# Patient Record
Sex: FEMALE | Race: WHITE | NOT HISPANIC OR LATINO | Employment: OTHER | URBAN - METROPOLITAN AREA
[De-identification: names, ages, dates, MRNs, and addresses within clinical notes are randomized per-mention and may not be internally consistent; named-entity substitution may affect disease eponyms.]

---

## 2018-01-10 ENCOUNTER — HISTORICAL (OUTPATIENT)
Dept: LAB | Facility: HOSPITAL | Age: 73
End: 2018-01-10

## 2018-01-10 LAB
ABS NEUT (OLG): 4.7 X10(3)/MCL (ref 2.1–9.2)
ALBUMIN SERPL-MCNC: 4.2 GM/DL (ref 3.4–5)
ALBUMIN/GLOB SERPL: 1.3 RATIO (ref 1.1–2)
ALP SERPL-CCNC: 84 UNIT/L (ref 38–126)
ALT SERPL-CCNC: 21 UNIT/L (ref 12–78)
APPEARANCE, UA: NORMAL
AST SERPL-CCNC: 17 UNIT/L (ref 15–37)
BACTERIA SPEC CULT: NORMAL /HPF
BASOPHILS # BLD AUTO: 0 X10(3)/MCL (ref 0–0.2)
BASOPHILS NFR BLD AUTO: 1 %
BILIRUB SERPL-MCNC: 0.5 MG/DL (ref 0.2–1)
BILIRUB UR QL STRIP: NEGATIVE
BILIRUBIN DIRECT+TOT PNL SERPL-MCNC: 0.1 MG/DL (ref 0–0.5)
BILIRUBIN DIRECT+TOT PNL SERPL-MCNC: 0.4 MG/DL (ref 0–0.8)
BUN SERPL-MCNC: 25 MG/DL (ref 7–18)
CALCIUM SERPL-MCNC: 9.6 MG/DL (ref 8.5–10.1)
CHLORIDE SERPL-SCNC: 107 MMOL/L (ref 98–107)
CHOLEST SERPL-MCNC: 234 MG/DL (ref 0–200)
CHOLEST/HDLC SERPL: 4 {RATIO} (ref 0–4)
CO2 SERPL-SCNC: 31 MMOL/L (ref 21–32)
COLOR UR: YELLOW
CREAT SERPL-MCNC: 0.87 MG/DL (ref 0.55–1.02)
EOSINOPHIL # BLD AUTO: 0.1 X10(3)/MCL (ref 0–0.9)
EOSINOPHIL NFR BLD AUTO: 2 %
ERYTHROCYTE [DISTWIDTH] IN BLOOD BY AUTOMATED COUNT: 13.2 % (ref 11.5–17)
GLOBULIN SER-MCNC: 3.2 GM/DL (ref 2.4–3.5)
GLUCOSE (UA): NEGATIVE
GLUCOSE SERPL-MCNC: 95 MG/DL (ref 74–106)
HCT VFR BLD AUTO: 40.8 % (ref 37–47)
HDLC SERPL-MCNC: 58 MG/DL (ref 35–60)
HGB BLD-MCNC: 13 GM/DL (ref 12–16)
HGB UR QL STRIP: NEGATIVE
KETONES UR QL STRIP: NEGATIVE
LDLC SERPL CALC-MCNC: 146 MG/DL (ref 0–129)
LEUKOCYTE ESTERASE UR QL STRIP: NEGATIVE
LYMPHOCYTES # BLD AUTO: 2.7 X10(3)/MCL (ref 0.6–4.6)
LYMPHOCYTES NFR BLD AUTO: 33 %
MCH RBC QN AUTO: 29.9 PG (ref 27–31)
MCHC RBC AUTO-ENTMCNC: 31.9 GM/DL (ref 33–36)
MCV RBC AUTO: 93.8 FL (ref 80–94)
MONOCYTES # BLD AUTO: 0.6 X10(3)/MCL (ref 0.1–1.3)
MONOCYTES NFR BLD AUTO: 7 %
NEUTROPHILS # BLD AUTO: 4.7 X10(3)/MCL (ref 1.4–7.9)
NEUTROPHILS NFR BLD AUTO: 57 %
NITRITE UR QL STRIP: NEGATIVE
PH UR STRIP: 8 [PH] (ref 5–9)
PLATELET # BLD AUTO: 290 X10(3)/MCL (ref 130–400)
PMV BLD AUTO: 9.4 FL (ref 9.4–12.4)
POTASSIUM SERPL-SCNC: 4.7 MMOL/L (ref 3.5–5.1)
PROT SERPL-MCNC: 7.4 GM/DL (ref 6.4–8.2)
PROT UR QL STRIP: NEGATIVE
RBC # BLD AUTO: 4.35 X10(6)/MCL (ref 4.2–5.4)
RBC #/AREA URNS HPF: NORMAL /[HPF]
SODIUM SERPL-SCNC: 141 MMOL/L (ref 136–145)
SP GR UR STRIP: 1.02 (ref 1–1.03)
SQUAMOUS EPITHELIAL, UA: NORMAL
TRIGL SERPL-MCNC: 150 MG/DL (ref 30–150)
TSH SERPL-ACNC: 1.58 MIU/ML (ref 0.36–3.74)
UROBILINOGEN UR STRIP-ACNC: 1
VLDLC SERPL CALC-MCNC: 30 MG/DL
WBC # SPEC AUTO: 8.2 X10(3)/MCL (ref 4.5–11.5)
WBC #/AREA URNS HPF: NORMAL /[HPF]

## 2018-03-01 ENCOUNTER — HISTORICAL (OUTPATIENT)
Dept: ADMINISTRATIVE | Facility: HOSPITAL | Age: 73
End: 2018-03-01

## 2019-06-17 ENCOUNTER — HISTORICAL (OUTPATIENT)
Dept: ADMINISTRATIVE | Facility: HOSPITAL | Age: 74
End: 2019-06-17

## 2019-07-25 ENCOUNTER — HISTORICAL (OUTPATIENT)
Dept: ADMINISTRATIVE | Facility: HOSPITAL | Age: 74
End: 2019-07-25

## 2019-09-12 ENCOUNTER — HISTORICAL (OUTPATIENT)
Dept: ADMINISTRATIVE | Facility: HOSPITAL | Age: 74
End: 2019-09-12

## 2019-10-07 ENCOUNTER — HISTORICAL (OUTPATIENT)
Dept: SURGERY | Facility: HOSPITAL | Age: 74
End: 2019-10-07

## 2019-10-29 ENCOUNTER — HISTORICAL (OUTPATIENT)
Dept: RADIOLOGY | Facility: HOSPITAL | Age: 74
End: 2019-10-29

## 2019-12-19 ENCOUNTER — HISTORICAL (OUTPATIENT)
Dept: ADMINISTRATIVE | Facility: HOSPITAL | Age: 74
End: 2019-12-19

## 2020-01-15 ENCOUNTER — HISTORICAL (OUTPATIENT)
Dept: LAB | Facility: HOSPITAL | Age: 75
End: 2020-01-15

## 2020-01-15 LAB
ABS NEUT (OLG): 3.69 X10(3)/MCL (ref 2.1–9.2)
ALBUMIN SERPL-MCNC: 4.2 GM/DL (ref 3.4–5)
ALBUMIN/GLOB SERPL: 1.2 {RATIO}
ALP SERPL-CCNC: 104 UNIT/L (ref 38–126)
ALT SERPL-CCNC: 35 UNIT/L (ref 12–78)
APPEARANCE, UA: ABNORMAL
AST SERPL-CCNC: 21 UNIT/L (ref 15–37)
BACTERIA SPEC CULT: ABNORMAL /HPF
BASOPHILS # BLD AUTO: 0 X10(3)/MCL (ref 0–0.2)
BASOPHILS NFR BLD AUTO: 1 %
BILIRUB SERPL-MCNC: 0.4 MG/DL (ref 0.2–1)
BILIRUB UR QL STRIP: NEGATIVE
BILIRUBIN DIRECT+TOT PNL SERPL-MCNC: 0.1 MG/DL (ref 0–0.2)
BILIRUBIN DIRECT+TOT PNL SERPL-MCNC: 0.3 MG/DL (ref 0–0.8)
BUN SERPL-MCNC: 23 MG/DL (ref 7–18)
CALCIUM SERPL-MCNC: 9.9 MG/DL (ref 8.5–10.1)
CHLORIDE SERPL-SCNC: 102 MMOL/L (ref 98–107)
CHOLEST SERPL-MCNC: 229 MG/DL (ref 0–200)
CHOLEST/HDLC SERPL: 5 {RATIO} (ref 0–4)
CO2 SERPL-SCNC: 29 MMOL/L (ref 21–32)
COLOR UR: YELLOW
CREAT SERPL-MCNC: 1.1 MG/DL (ref 0.55–1.02)
EOSINOPHIL # BLD AUTO: 0 X10(3)/MCL (ref 0–0.9)
EOSINOPHIL NFR BLD AUTO: 0 %
ERYTHROCYTE [DISTWIDTH] IN BLOOD BY AUTOMATED COUNT: 14.3 % (ref 11.5–17)
EST. AVERAGE GLUCOSE BLD GHB EST-MCNC: 97 MG/DL
GLOBULIN SER-MCNC: 3.4 GM/DL (ref 2.4–3.5)
GLUCOSE (UA): NEGATIVE
GLUCOSE SERPL-MCNC: 88 MG/DL (ref 74–106)
HBA1C MFR BLD: 5 % (ref 4.2–6.3)
HCT VFR BLD AUTO: 40 % (ref 37–47)
HDLC SERPL-MCNC: 46 MG/DL (ref 35–60)
HGB BLD-MCNC: 12.3 GM/DL (ref 12–16)
HGB UR QL STRIP: NEGATIVE
KETONES UR QL STRIP: NEGATIVE
LDLC SERPL CALC-MCNC: 151 MG/DL (ref 0–129)
LEUKOCYTE ESTERASE UR QL STRIP: NEGATIVE
LYMPHOCYTES # BLD AUTO: 1.8 X10(3)/MCL (ref 0.6–4.6)
LYMPHOCYTES NFR BLD AUTO: 30 %
MCH RBC QN AUTO: 30 PG (ref 27–31)
MCHC RBC AUTO-ENTMCNC: 30.8 GM/DL (ref 33–36)
MCV RBC AUTO: 97.6 FL (ref 80–94)
MONOCYTES # BLD AUTO: 0.5 X10(3)/MCL (ref 0.1–1.3)
MONOCYTES NFR BLD AUTO: 8 %
NEUTROPHILS # BLD AUTO: 3.69 X10(3)/MCL (ref 2.1–9.2)
NEUTROPHILS NFR BLD AUTO: 61 %
NITRITE UR QL STRIP: NEGATIVE
PH UR STRIP: 8.5 [PH] (ref 5–9)
PLATELET # BLD AUTO: 321 X10(3)/MCL (ref 130–400)
PMV BLD AUTO: 8.9 FL (ref 9.4–12.4)
POTASSIUM SERPL-SCNC: 4.1 MMOL/L (ref 3.5–5.1)
PROT SERPL-MCNC: 7.6 GM/DL (ref 6.4–8.2)
PROT UR QL STRIP: ABNORMAL
RBC # BLD AUTO: 4.1 X10(6)/MCL (ref 4.2–5.4)
RBC #/AREA URNS HPF: ABNORMAL /HPF
SODIUM SERPL-SCNC: 140 MMOL/L (ref 136–145)
SP GR UR STRIP: 1.02 (ref 1–1.03)
SQUAMOUS EPITHELIAL, UA: ABNORMAL
TRIGL SERPL-MCNC: 159 MG/DL (ref 30–150)
TSH SERPL-ACNC: 1.38 MIU/L (ref 0.36–3.74)
UA WBC MAN: ABNORMAL /HPF
UROBILINOGEN UR STRIP-ACNC: 1
VLDLC SERPL CALC-MCNC: 32 MG/DL
WBC # SPEC AUTO: 6.1 X10(3)/MCL (ref 4.5–11.5)

## 2020-01-23 ENCOUNTER — HISTORICAL (OUTPATIENT)
Dept: ADMINISTRATIVE | Facility: HOSPITAL | Age: 75
End: 2020-01-23

## 2020-06-12 ENCOUNTER — HISTORICAL (OUTPATIENT)
Dept: ADMINISTRATIVE | Facility: HOSPITAL | Age: 75
End: 2020-06-12

## 2020-07-09 ENCOUNTER — HISTORICAL (OUTPATIENT)
Dept: ADMINISTRATIVE | Facility: HOSPITAL | Age: 75
End: 2020-07-09

## 2020-07-09 LAB
ALBUMIN SERPL-MCNC: 4 GM/DL (ref 3.4–4.8)
ALBUMIN/GLOB SERPL: 1.3 RATIO (ref 1.1–2)
ALP SERPL-CCNC: 68 UNIT/L (ref 40–150)
ALT SERPL-CCNC: 22 UNIT/L (ref 0–55)
AST SERPL-CCNC: 16 UNIT/L (ref 5–34)
BILIRUB SERPL-MCNC: 0.4 MG/DL
BILIRUBIN DIRECT+TOT PNL SERPL-MCNC: 0.1 MG/DL (ref 0–0.5)
BILIRUBIN DIRECT+TOT PNL SERPL-MCNC: 0.3 MG/DL (ref 0–0.8)
BUN SERPL-MCNC: 30.6 MG/DL (ref 9.8–20.1)
CALCIUM SERPL-MCNC: 9.4 MG/DL (ref 8.4–10.2)
CHLORIDE SERPL-SCNC: 103 MMOL/L (ref 98–107)
CHOLEST SERPL-MCNC: 268 MG/DL
CHOLEST/HDLC SERPL: 5 {RATIO} (ref 0–5)
CO2 SERPL-SCNC: 31 MMOL/L (ref 23–31)
CREAT SERPL-MCNC: 1.14 MG/DL (ref 0.55–1.02)
GLOBULIN SER-MCNC: 3.1 GM/DL (ref 2.4–3.5)
GLUCOSE SERPL-MCNC: 95 MG/DL (ref 82–115)
HDLC SERPL-MCNC: 55 MG/DL (ref 35–60)
LDLC SERPL CALC-MCNC: 196 MG/DL (ref 50–140)
POTASSIUM SERPL-SCNC: 4.3 MMOL/L (ref 3.5–5.1)
PROT SERPL-MCNC: 7.1 GM/DL (ref 5.8–7.6)
SODIUM SERPL-SCNC: 140 MMOL/L (ref 136–145)
TRIGL SERPL-MCNC: 85 MG/DL (ref 37–140)
VLDLC SERPL CALC-MCNC: 17 MG/DL

## 2020-10-14 ENCOUNTER — HISTORICAL (OUTPATIENT)
Dept: ADMINISTRATIVE | Facility: HOSPITAL | Age: 75
End: 2020-10-14

## 2020-10-14 LAB
CHOLEST SERPL-MCNC: 223 MG/DL
CHOLEST/HDLC SERPL: 5 {RATIO} (ref 0–5)
HDLC SERPL-MCNC: 49 MG/DL (ref 35–60)
LDLC SERPL CALC-MCNC: 155 MG/DL (ref 50–140)
TRIGL SERPL-MCNC: 93 MG/DL (ref 37–140)
VLDLC SERPL CALC-MCNC: 19 MG/DL

## 2021-01-12 ENCOUNTER — HISTORICAL (OUTPATIENT)
Dept: ADMINISTRATIVE | Facility: HOSPITAL | Age: 76
End: 2021-01-12

## 2021-01-12 LAB
ABS NEUT (OLG): 4.64 X10(3)/MCL (ref 2.1–9.2)
ALBUMIN SERPL-MCNC: 4.5 GM/DL (ref 3.4–4.8)
ALBUMIN/GLOB SERPL: 1.5 RATIO (ref 1.1–2)
ALP SERPL-CCNC: 114 UNIT/L (ref 40–150)
ALT SERPL-CCNC: 66 UNIT/L (ref 0–55)
APPEARANCE, UA: CLEAR
AST SERPL-CCNC: 20 UNIT/L (ref 5–34)
BACTERIA SPEC CULT: ABNORMAL /HPF
BASOPHILS # BLD AUTO: 0.1 X10(3)/MCL (ref 0–0.2)
BASOPHILS NFR BLD AUTO: 1 %
BILIRUB SERPL-MCNC: 0.4 MG/DL
BILIRUB UR QL STRIP: NEGATIVE
BILIRUBIN DIRECT+TOT PNL SERPL-MCNC: 0.2 MG/DL (ref 0–0.5)
BILIRUBIN DIRECT+TOT PNL SERPL-MCNC: 0.2 MG/DL (ref 0–0.8)
BUN SERPL-MCNC: 24.3 MG/DL (ref 9.8–20.1)
CALCIUM SERPL-MCNC: 9.6 MG/DL (ref 8.4–10.2)
CHLORIDE SERPL-SCNC: 105 MMOL/L (ref 98–107)
CHOLEST SERPL-MCNC: 180 MG/DL
CHOLEST/HDLC SERPL: 4 {RATIO} (ref 0–5)
CO2 SERPL-SCNC: 24 MMOL/L (ref 23–31)
COLOR UR: YELLOW
CREAT SERPL-MCNC: 0.91 MG/DL (ref 0.55–1.02)
EOSINOPHIL # BLD AUTO: 0.1 X10(3)/MCL (ref 0–0.9)
EOSINOPHIL NFR BLD AUTO: 1 %
ERYTHROCYTE [DISTWIDTH] IN BLOOD BY AUTOMATED COUNT: 13.7 % (ref 11.5–17)
GLOBULIN SER-MCNC: 3 GM/DL (ref 2.4–3.5)
GLUCOSE (UA): NEGATIVE
GLUCOSE SERPL-MCNC: 86 MG/DL (ref 82–115)
HCT VFR BLD AUTO: 35.9 % (ref 37–47)
HDLC SERPL-MCNC: 43 MG/DL (ref 35–60)
HGB BLD-MCNC: 11.2 GM/DL (ref 12–16)
HGB UR QL STRIP: NEGATIVE
KETONES UR QL STRIP: NEGATIVE
LDLC SERPL CALC-MCNC: 117 MG/DL (ref 50–140)
LEUKOCYTE ESTERASE UR QL STRIP: ABNORMAL
LYMPHOCYTES # BLD AUTO: 2.3 X10(3)/MCL (ref 0.6–4.6)
LYMPHOCYTES NFR BLD AUTO: 30 %
MCH RBC QN AUTO: 30.2 PG (ref 27–31)
MCHC RBC AUTO-ENTMCNC: 31.2 GM/DL (ref 33–36)
MCV RBC AUTO: 96.8 FL (ref 80–94)
MONOCYTES # BLD AUTO: 0.5 X10(3)/MCL (ref 0.1–1.3)
MONOCYTES NFR BLD AUTO: 6 %
NEUTROPHILS # BLD AUTO: 4.64 X10(3)/MCL (ref 2.1–9.2)
NEUTROPHILS NFR BLD AUTO: 61 %
NITRITE UR QL STRIP: NEGATIVE
PH UR STRIP: 6.5 [PH] (ref 5–9)
PLATELET # BLD AUTO: 415 X10(3)/MCL (ref 130–400)
PMV BLD AUTO: 10.1 FL (ref 9.4–12.4)
POTASSIUM SERPL-SCNC: 4.3 MMOL/L (ref 3.5–5.1)
PROT SERPL-MCNC: 7.5 GM/DL (ref 5.8–7.6)
PROT UR QL STRIP: NEGATIVE
RBC # BLD AUTO: 3.71 X10(6)/MCL (ref 4.2–5.4)
RBC #/AREA URNS HPF: ABNORMAL /[HPF]
SODIUM SERPL-SCNC: 141 MMOL/L (ref 136–145)
SP GR UR STRIP: 1.01 (ref 1–1.03)
SQUAMOUS EPITHELIAL, UA: ABNORMAL
TRIGL SERPL-MCNC: 100 MG/DL (ref 37–140)
TSH SERPL-ACNC: 1.22 UIU/ML (ref 0.35–4.94)
UROBILINOGEN UR STRIP-ACNC: 0.2
VLDLC SERPL CALC-MCNC: 20 MG/DL
WBC # SPEC AUTO: 7.6 X10(3)/MCL (ref 4.5–11.5)
WBC #/AREA URNS HPF: ABNORMAL /[HPF]

## 2021-03-02 ENCOUNTER — HISTORICAL (OUTPATIENT)
Dept: ADMINISTRATIVE | Facility: HOSPITAL | Age: 76
End: 2021-03-02

## 2021-07-06 LAB — BCS RECOMMENDATION EXT: NORMAL

## 2021-08-25 ENCOUNTER — HISTORICAL (OUTPATIENT)
Dept: ADMINISTRATIVE | Facility: HOSPITAL | Age: 76
End: 2021-08-25

## 2021-08-25 LAB
ABS NEUT (OLG): 6.63 X10(3)/MCL (ref 2.1–9.2)
ALBUMIN SERPL-MCNC: 4.3 GM/DL (ref 3.4–4.8)
ALBUMIN/GLOB SERPL: 1.5 RATIO (ref 1.1–2)
ALP SERPL-CCNC: 95 UNIT/L (ref 40–150)
ALT SERPL-CCNC: 14 UNIT/L (ref 0–55)
AST SERPL-CCNC: 17 UNIT/L (ref 5–34)
BASOPHILS # BLD AUTO: 0.1 X10(3)/MCL (ref 0–0.2)
BASOPHILS NFR BLD AUTO: 1 %
BILIRUB SERPL-MCNC: 0.4 MG/DL
BILIRUBIN DIRECT+TOT PNL SERPL-MCNC: 0.2 MG/DL (ref 0–0.5)
BILIRUBIN DIRECT+TOT PNL SERPL-MCNC: 0.2 MG/DL (ref 0–0.8)
BUN SERPL-MCNC: 23.8 MG/DL (ref 9.8–20.1)
CALCIUM SERPL-MCNC: 10 MG/DL (ref 8.4–10.2)
CHLORIDE SERPL-SCNC: 101 MMOL/L (ref 98–107)
CO2 SERPL-SCNC: 29 MMOL/L (ref 23–31)
CREAT SERPL-MCNC: 0.92 MG/DL (ref 0.55–1.02)
DEPRECATED CALCIDIOL+CALCIFEROL SERPL-MC: 45.3 NG/ML (ref 30–80)
EOSINOPHIL # BLD AUTO: 0 X10(3)/MCL (ref 0–0.9)
EOSINOPHIL NFR BLD AUTO: 0 %
ERYTHROCYTE [DISTWIDTH] IN BLOOD BY AUTOMATED COUNT: 13.5 % (ref 11.5–17)
GLOBULIN SER-MCNC: 2.8 GM/DL (ref 2.4–3.5)
GLUCOSE SERPL-MCNC: 87 MG/DL (ref 82–115)
HCT VFR BLD AUTO: 40.7 % (ref 37–47)
HGB BLD-MCNC: 12.9 GM/DL (ref 12–16)
LYMPHOCYTES # BLD AUTO: 2.7 X10(3)/MCL (ref 0.6–4.6)
LYMPHOCYTES NFR BLD AUTO: 27 %
MCH RBC QN AUTO: 29.8 PG (ref 27–31)
MCHC RBC AUTO-ENTMCNC: 31.7 GM/DL (ref 33–36)
MCV RBC AUTO: 94 FL (ref 80–94)
MONOCYTES # BLD AUTO: 0.6 X10(3)/MCL (ref 0.1–1.3)
MONOCYTES NFR BLD AUTO: 6 %
NEUTROPHILS # BLD AUTO: 6.63 X10(3)/MCL (ref 2.1–9.2)
NEUTROPHILS NFR BLD AUTO: 66 %
PLATELET # BLD AUTO: 359 X10(3)/MCL (ref 130–400)
PMV BLD AUTO: 9.2 FL (ref 9.4–12.4)
POTASSIUM SERPL-SCNC: 4.6 MMOL/L (ref 3.5–5.1)
PROT SERPL-MCNC: 7.1 GM/DL (ref 5.8–7.6)
RBC # BLD AUTO: 4.33 X10(6)/MCL (ref 4.2–5.4)
SODIUM SERPL-SCNC: 139 MMOL/L (ref 136–145)
WBC # SPEC AUTO: 10.1 X10(3)/MCL (ref 4.5–11.5)

## 2021-09-09 ENCOUNTER — HISTORICAL (OUTPATIENT)
Dept: RADIOLOGY | Facility: HOSPITAL | Age: 76
End: 2021-09-09

## 2021-10-13 LAB — CRC RECOMMENDATION EXT: NORMAL

## 2022-01-19 ENCOUNTER — HISTORICAL (OUTPATIENT)
Dept: LAB | Facility: HOSPITAL | Age: 77
End: 2022-01-19

## 2022-01-19 LAB
ALBUMIN SERPL-MCNC: 4.3 GM/DL (ref 3.4–4.8)
ALBUMIN/GLOB SERPL: 1.4 RATIO (ref 1.1–2)
ALP SERPL-CCNC: 125 UNIT/L (ref 40–150)
ALT SERPL-CCNC: 15 UNIT/L (ref 0–55)
AST SERPL-CCNC: 18 UNIT/L (ref 5–34)
BILIRUB SERPL-MCNC: 0.4 MG/DL (ref 0.2–1.2)
BILIRUBIN DIRECT+TOT PNL SERPL-MCNC: 0.2 MG/DL (ref 0–0.5)
BILIRUBIN DIRECT+TOT PNL SERPL-MCNC: 0.2 MG/DL (ref 0–0.8)
BUN SERPL-MCNC: 21.1 MG/DL (ref 9.8–20.1)
CALCIUM SERPL-MCNC: 10.1 MG/DL (ref 8.4–10.2)
CHLORIDE SERPL-SCNC: 103 MMOL/L (ref 98–107)
CHOLEST SERPL-MCNC: 214 MG/DL
CHOLEST/HDLC SERPL: 4 {RATIO} (ref 0–5)
CO2 SERPL-SCNC: 30 MMOL/L (ref 23–31)
CREAT SERPL-MCNC: 1.08 MG/DL (ref 0.57–1.11)
GLOBULIN SER-MCNC: 3.1 GM/DL (ref 2.4–3.5)
GLUCOSE SERPL-MCNC: 93 MG/DL (ref 82–115)
HCV AB SERPL QL IA: NONREACTIVE
HDLC SERPL-MCNC: 52 MG/DL (ref 40–60)
LDLC SERPL CALC-MCNC: 148 MG/DL (ref 50–140)
POTASSIUM SERPL-SCNC: 5 MMOL/L (ref 3.5–5.1)
PROT SERPL-MCNC: 7.4 GM/DL (ref 5.8–7.6)
SODIUM SERPL-SCNC: 142 MMOL/L (ref 136–145)
TRIGL SERPL-MCNC: 72 MG/DL (ref 0–150)
VLDLC SERPL CALC-MCNC: 14 MG/DL

## 2022-01-27 ENCOUNTER — HISTORICAL (OUTPATIENT)
Dept: ADMINISTRATIVE | Facility: HOSPITAL | Age: 77
End: 2022-01-27

## 2022-01-28 LAB
APPEARANCE, UA: CLEAR
BACTERIA SPEC CULT: NORMAL
BILIRUB UR QL STRIP: NEGATIVE
BUDDING YEAST: NORMAL
CASTS, UA: NORMAL
COLOR UR: YELLOW
CRYSTALS: NORMAL
GLUCOSE (UA): NEGATIVE
HGB UR QL STRIP: NEGATIVE
KETONES UR QL STRIP: NEGATIVE
LEUKOCYTE ESTERASE UR QL STRIP: NEGATIVE
NITRITE UR QL STRIP: NEGATIVE
PH UR STRIP: 6.5 [PH] (ref 5–9)
PROT UR QL STRIP: NEGATIVE
RBC #/AREA URNS HPF: NORMAL /[HPF] (ref 0–2)
SMALL ROUND CELLS, UA: NORMAL
SP GR UR STRIP: 1.02 (ref 1–1.03)
SPERM URNS QL MICRO: NORMAL
SQUAMOUS EPITHELIAL, UA: NORMAL (ref 0–4)
UROBILINOGEN UR STRIP-ACNC: 0.2
WBC #/AREA URNS HPF: NORMAL /[HPF] (ref 0–2)

## 2022-04-10 ENCOUNTER — HISTORICAL (OUTPATIENT)
Dept: ADMINISTRATIVE | Facility: HOSPITAL | Age: 77
End: 2022-04-10
Payer: MEDICARE

## 2022-04-29 VITALS
SYSTOLIC BLOOD PRESSURE: 169 MMHG | OXYGEN SATURATION: 99 % | BODY MASS INDEX: 20.48 KG/M2 | DIASTOLIC BLOOD PRESSURE: 96 MMHG | WEIGHT: 111.31 LBS | HEIGHT: 62 IN

## 2022-04-30 NOTE — OP NOTE
Patient:   Sherry Heredia             MRN: [id number]            FIN: [id number]               Age:   74 years     Sex:  Female     :  1945   Associated Diagnoses:   Primary osteoarthritis of left hip; Primary osteoarthritis of left knee; Primary osteoarthritis of right knee   Author:   José Garcia MD      Operative Note   Operative Information   Date/ Time:  10/15/2018 07:38:00.     Procedures Performed: Left hip injection using fluoroscopy.     Preoperative Diagnosis: Primary osteoarthritis of left hip ([id number] M16.12), Primary osteoarthritis of left hip (PTJ63-UD M16.12), Primary osteoarthritis of left knee (PIJ53-PF M17.12), Primary osteoarthritis of right knee (EKU34-[id number] M17.11).     Postoperative Diagnosis: Primary osteoarthritis of left hip (XVO05-PO M16.12), as above.     Surgeon: José Garcia MD.     Anesthesia: concious sedation.     Description of Procedure/Findings/    Complications: Patient was involved who complains of ongoing left hip pain.  We discussed all treatment options.  Patient has a history of arthritis to the left hip.  Patient has tried and failed all measures.  The risk, benefits, alternatives to injection of left hip under anesthesia were discussed in detail including but limited to infection, bleeding, scarring, need for revision surgery, and resolution of pain.  Despite these risks patient elected to proceed with surgical mention.    Patient seen in preoperative holding.  The risk benefits alternatives again discussed.  All questions answered no guarantees made.  Patient was marked and consented.  Was taken the operating room.  placed under conscious sedation.  Using fluoroscopic guidance, the insertion site was localized.  It was injected with 3-4 cc 1% lidocaine.  Afterwards an 18-gauge spinal needle was then inserted using fluoroscopic guidance to the superior anterior aspect of the left hip/acetabulum.  2-3 cc of Isovue contrast was then injected.  We had an  excellent arthrogram.  Afterwards 2 cc 1% lidocaine and 12 mg of betamethasone was injected into the right hip joint.  Needle was removed.  Band-Aid was placed over the injection site.      After verbal consent was obtained the patient's left knee was prepped with Betadine and anesthetized with ethyl chloride. The left knee joint was then injected under sterile technique with 2 mL of 2% lidocaine and 12 mg betamethasone. It was dressed with a Band-Aid.     After verbal consent was obtained the patient's right knee was prepped with Betadine and anesthetized with ethyl chloride. The right knee joint was then injected under sterile technique with 2 mL of 2% lidocaine and betamethasone it was dressed with a Band-Aid.     Patient arose from anesthesia without issue.  Was transferred to recovery room in stable condition. postop he will be weightbearing as tolerated.  Patient will be discharged home.  .     Esimated blood loss: No blood loss.

## 2022-05-04 NOTE — HISTORICAL [facility]
This is a historical note converted from Billy. Formatting and pictures may have been removed.  Please reference Billy for original formatting and attached multimedia. Chief Complaint  2 WEEK POST RIGHT VERNELL ON 12/4/19  History of Present Illness  74-year-old female presents here for follow-up after total hip on the right side. ?Patient is 2 weeks out. ?She is overall doing well per using a walker for ambulation. ?Pain is well controlled currently.  Review of Systems  Denies fevers, chills, chest pain, shortness of breath. Comprehensive review of systems performed and otherwise negative except as noted in HPI.  Physical Exam  Vitals & Measurements  T:?98.6? ?F (Oral)? BP:?112/62?  HT:?157.48?cm? WT:?54.6?kg? BMI:?22.02?  General: No acute distress, alert and oriented, healthy appearing?  HEENT: Head is atraumatic, mucous membranes are moist  Cardiovascular: Brisk capillary refill  Lungs: Breathing non-labored  Skin: no rashes appreciated  Neurologic: Sensation is grossly intact distally  ?  Hip exam:  Right?hip incision clean dry and intact. No erythema, drainage, or signs of infection  No swelling distally. No signs of DVT  No pain with logroll  Sensation intact distally to right foot  Positive FHL/EHL/gastrocsoleus/tib ant brisk capillary refill right foot  ?  Assessment/Plan  1.?Aftercare following joint replacement?Z47.1  ?Patient doing well following TURP on the right side. ?Pain is well controlled. ?She is ambulating well. ?Her incision is clean and dry. ?We will check her again?in a month. ?Continue physical therapy for gait training.  Ordered:  Clinic Follow up, *Est. 01/23/20 15:30:00 CST, Order for future visit, Aftercare following joint replacement, [facility]  Post-Op follow-up visit [id number] PC, Aftercare following joint replacement, [facility], 12/19/19 16:04:00 CST  XR Hip Right 2 Views, Routine, *Est. 01/16/20 3:00:00 CST, Arthritis, None, Patient Bed, Patient Has IV?, Rad Type, Order for  future visit, Aftercare following joint replacement, Not Scheduled, *Est. 01/16/20 3:00:00 CST  ?  Referrals  Clinic Follow up, *Est. 01/23/20 15:30:00 CST, Order for future visit, Aftercare following joint replacement, [facility]   Problem List/Past Medical History  Ongoing  Amblyopia  Bilateral age-related nuclear cataracts  Dry eyes  elevated cholestrol  GERD (gastroesophageal reflux disease)  Hyperlipidemia, mixed  Hypertension  Left knee pain  Migraines  Osteopenia  Right hip pain  Historical  Acute right eye pain  Acute sinus infection  Cholelithiasis  Diverticulitis  kidney stones  Panic attacks  Screening mammogram, encounter for  Wears glasses  Well adult exam  Procedure/Surgical History  EMILY ROBERTS Total Hip Arthroplasty (Right) (12/04/2019)  Replacement of Right Hip Joint with Synthetic Substitute, Uncemented, Open Approach (12/04/2019)  Robotic Assisted Procedure of Trunk Region, Open Approach (12/04/2019)  Arthrocentesis, aspiration and/or injection, major joint or bursa (eg, shoulder, hip, knee, subacromial bursa); without ultrasound guidance (10/07/2019)  Fluoroscopy of Left Hip using Low Osmolar Contrast (10/07/2019)  Injection Hip (Right) (10/07/2019)  Injection Knee (Bilateral) (10/07/2019)  Introduction of Analgesics, Hypnotics, Sedatives into Joints, Percutaneous Approach (10/07/2019)  Introduction of Anti-inflammatory into Joints, Percutaneous Approach (10/07/2019)  Bravo PH (07/01/2014)  Esophagogastroduodenoscopy (07/01/2014)  Esophagogastroduodenoscopy, flexible, transoral; diagnostic, including collection of specimen(s) by brushing or washing, when performed (separate procedure) (07/01/2014)  Other endoscopy of small intestine (07/01/2014)  Laparoscopic cholecystectomy (08/01/2012)  Laparoscopy, surgical; cholecystectomy. (08/01/2012)  reconstruction of Rt eyelid (1971)  Tubal ligation (1947)  Appendectomy  Gallbladder  Hemorroidectomy  lap joe  removal of left ovary  Removal of Rt heel  spur   Medications  aspirin 81 mg oral Delayed Release (EC) tablet, 81 mg= 1 tab(s), Oral, BID  calcium-vitamin D 600 mg-400 intl units oral tablet, 1 tab(s), Oral, BID  hydrochlorothiazide-lisinopril 12.5 mg-10 mg oral tablet, See Instructions, 3 refills  levoFLOXacin 500 mg oral tablet, 500 mg= 1 tab(s), Oral, Daily  ranitidine 300 mg oral tablet, See Instructions, 3 refills  Restasis 0.05% Eye Emulsion, 1 drop(s), Eye-Both, BID  Robaxin 750 mg oral tablet, 750 mg= 1 tab(s), Oral, TID, PRN,? ?Not taking: Last Dose Date/Time Unknown  Tylenol, 500 mg= 1 tab(s), Oral, q4hr  valACYclovir 1 g oral tablet, 2 gm= 2 tab(s), Oral, BID, 1 refills,? ?Still taking, not as prescribed: takes 1 gram (1 tab BID PRN fever blisters)  Vitamin D3 2000 intl units oral capsule, 1 tab(s), Daily,? ?Still taking, not as prescribed: Takes 1000 IU daily  Zofran ODT 4 mg oral tablet, disintegrating, 4 mg= 1 tab(s), Oral, q6hr, PRN, 1 refills,? ?Not taking: Last Dose Date/Time Unknown  Allergies  sulfamethoxazole?(itcing, red splotches)  Social History  Abuse/Neglect  No, 09/24/2019  Alcohol - Low Risk, 06/27/2014  Current, 1-2 times per year, 09/24/2019  Employment/School  Retired, 01/17/2019  Home/Environment  Lives with Spouse. Living situation: Home/Independent., 01/17/2019  Nutrition/Health  Regular, 11/12/2019  Sexual  Sexually active: Yes., 06/17/2019  Spiritual/Cultural  Oriental orthodox, 09/24/2019  Substance Use - Denies Substance Abuse, 07/31/2012  Never, 09/24/2019  Tobacco - Denies Tobacco Use, 07/31/2012  Never (less than 100 in lifetime), N/A, 12/04/2019  Family History  Primary malignant neoplasm of breast: Sister.  Immunizations  Vaccine Date Status   tetanus/diphtheria/pertussis, acel(Tdap) 01/01/2016 Recorded   Health Maintenance  Health Maintenance  ???Pending?(in the next year)  ??? ??OverDue  ??? ? ? ?Zoster Vaccine due??06/08/18??and every 100??year(s)  ??? ? ? ?Tetanus Vaccine due??06/27/18??and every 10??year(s)  ???  ??Due?  ??? ? ? ?Pneumococcal Vaccine due??12/19/19??and every?  ??? ??Postponed?  ??? ? ? ?Pneumococcal Vaccine due??01/01/20??and every 1??day(s)  ??? ??Due In Future?  ??? ? ? ?Advance Directive not due until??01/01/20??and every 1??year(s)  ??? ? ? ?Alcohol Misuse Screening not due until??01/01/20??and every 1??year(s)  ??? ? ? ?Cognitive Screening not due until??01/01/20??and every 1??year(s)  ??? ? ? ?Fall Risk Assessment not due until??01/01/20??and every 1??year(s)  ??? ? ? ?Functional Assessment not due until??01/01/20??and every 1??year(s)  ??? ? ? ?Geriatric Depression Screening not due until??01/01/20??and every 1??year(s)  ??? ? ? ?Obesity Screening not due until??01/01/20??and every 1??year(s)  ??? ? ? ?Hypertension Management-Education not due until??01/17/20??and every 1??year(s)  ??? ? ? ?ADL Screening not due until??01/17/20??and every 1??year(s)  ??? ? ? ?Bone Density Screening not due until??02/09/20??and every 2??year(s)  ??? ? ? ?Hypertension Maintenance-Medication Prescribed not due until??06/17/20??and every 1??year(s)  ??? ? ? ?Hypertension Management-BMP not due until??12/04/20??and every 1??year(s)  ??? ? ? ?Aspirin Therapy for CVD Prevention not due until??12/05/20??and every 1??year(s)  ??? ? ? ?Hypertension Management-Blood Pressure not due until??12/18/20??and every 1??year(s)  ???Satisfied?(in the past 1 year)  ??? ??Satisfied?  ??? ? ? ?ADL Screening on??01/17/19.??Satisfied by Brittny Dowling LPN  ??? ? ? ?Advance Directive on??06/24/19.??Satisfied by Brittny Dowling LPN  ??? ? ? ?Alcohol Misuse Screening on??01/17/19.??Satisfied by Brittny Dowling LPN  ??? ? ? ?Aspirin Therapy for CVD Prevention on??12/05/19.??Satisfied by Eve Martinez NP  ??? ? ? ?Blood Pressure Screening on??12/19/19.??Satisfied by Eduarda Canada  ??? ? ? ?Body Mass Index Check on??12/19/19.??Satisfied by Eduarda Canada  ??? ? ? ?Breast Cancer Screening ([facility]) on??02/12/19.??Satisfied by  Amina Diaz  ??? ? ? ?Cognitive Screening on??01/17/19.??Satisfied by Brittny Dowling LPN  ??? ? ? ?Depression Screening on??09/24/19.??Satisfied by Chyna Lehman  ??? ? ? ?Diabetes Screening on??12/05/19.??Satisfied by Ayaan Mar  ??? ? ? ?Fall Risk Assessment on??10/07/19.??Satisfied by Lanie Metzger RN  ??? ? ? ?Functional Assessment on??01/17/19.??Satisfied by Brittny Dowling LPN  ??? ? ? ?Geriatric Depression Screening on??01/17/19.??Satisfied by Brittny Dowling LPN  ??? ? ? ?Hypertension Management-Blood Pressure on??12/19/19.??Satisfied by Eduarda Canada  ??? ? ? ?Hypertension Maintenance-Medication Prescribed on??06/17/19.??Satisfied by Brittny Dowling LPN  ??? ? ? ?Influenza Vaccine on??12/04/19.??Satisfied by Lanie Metzger RN  ??? ? ? ?Obesity Screening on??12/19/19.??Satisfied by Eduarda Canada  ?  Diagnostic Results  AP lateral hip reviewed. ?Patients implants appear well fixed. ?No signs of loosening or subsidence noted.

## 2022-05-04 NOTE — HISTORICAL [facility]
This is a historical note converted from Billy. Formatting and pictures may have been removed.  Please reference Billy for original formatting and attached multimedia. Chief Complaint  B/L KNEE PAIN AND RIGHT HIP REFERRED BY DR. BORGES HE GAVE HER AN INJECTION IN THE HIP NO HELP HE PUT HER ON MOBIC WHICH HAS HELPED.  History of Present Illness  74-year-old female presents today for evaluation of right groin knee pain. ?Patient is with significant amount of groin pain?with system to put on her shoes and socks. ?She is unable to flex her hip?although this has improved with some meloxicam. ?Significant pain getting out of a chair as well as walking on flat ground.  Review of Systems  Denies fevers, chills, chest pain, shortness of breath. Comprehensive review of systems performed and otherwise negative except as noted in HPI.  Physical Exam  Vitals & Measurements  T:?97.3? ?F (Oral)? BP:?157/85?  HT:?158?cm? WT:?54.6?kg? BMI:?21.87?  General: No acute distress, alert and oriented, healthy appearing?  HEENT: Head is atraumatic, mucous membranes are moist  Neck: Supples, no JVD  Cardiovascular: Palpable dorsalis pedis and posterior tibial pulses, regular rate and rhythm to those pulses  Lungs: Breathing non-labored  Skin: no rashes appreciated  Neurologic: Can flex and extend knees, ankles, and toes. Sensation is grossly intact  ?  Right hip:  Sensation intact disappeared brisk cap refill distally. ?Range of motion right hip with significant pain.? Patient is a positive [place]inchfield today. ?She has significant pain with internal rotation to her groin.  ?  Bilateral knees:  Crepitus of patellofemoral joint of both of her knees. ?Sensation intact disappeared she has full extension. ?Flexion to 125.  Assessment/Plan  1.?Primary osteoarthritis of right knee?M17.11  ?Patient end-stage arthritis to right knee with significant chondrocalcinosis. ?She does not wish any further intervention right now such as injections as?her  main symptoms coming from her hip. ?She would be a candidate for injections in the near future if she wishes.  Ordered:  Office/Outpatient Visit Level 3 Established [id number] PC, Primary osteoarthritis of right knee  Primary osteoarthritis of right hip, [facility], 09/12/19 14:35:00 CDT  ?  2.?Primary osteoarthritis of right hip?M16.11  ?Patient with significant arthritis to her right hip is judged on x-ray as well as CT scan. ?We discussed all treatment options. ?She does not wish to undergo surgical intervention but is in favor of conservative measures with an injection.? The risk, benefits, alternatives?to injection/arthrogram of right hip were discussed in detail.? We will plan to inject the right hip with 2 cc of Celestone and 2 cc of lidocaine?at next convenient interval.  Ordered:  Office/Outpatient Visit Level 3 Established [id number] PC, Primary osteoarthritis of right knee  Primary osteoarthritis of right hip, [facility], 09/12/19 14:35:00 CDT  ?  Orders:  XR Knee Left 4 or More Views, Routine, 09/12/19 14:07:00 CDT, Pain, None, Ambulatory, Patient Has IV?, Rad Type, Acute knee pain, Not Scheduled, 09/12/19 14:07:00 CDT  XR Knee Right 4 or More Views, Routine, 09/12/19 14:06:00 CDT, Pain, None, Ambulatory, Patient Has IV?, Rad Type, Acute knee pain, Not Scheduled, 09/12/19 14:06:00 CDT   Problem List/Past Medical History  Ongoing  Dry eyes  elevated cholestrol  GERD (gastroesophageal reflux disease)  Hyperlipidemia, mixed  Hypertension  Left knee pain  Migraines  Osteopenia  Right hip pain  Historical  Acute right eye pain  Acute sinus infection  Cholelithiasis  Diverticulitis  kidney stones  Panic attacks  Screening mammogram, encounter for  Wears glasses  Well adult exam  Procedure/Surgical History  Bravo PH (07/01/2014)  Esophagogastroduodenoscopy (07/01/2014)  Esophagogastroduodenoscopy, flexible, transoral; diagnostic, including collection of specimen(s) by brushing or washing, when  performed (separate procedure) (07/01/2014)  Other endoscopy of small intestine (07/01/2014)  Laparoscopic cholecystectomy (08/01/2012)  Laparoscopy, surgical; cholecystectomy. (08/01/2012)  reconstruction of Rt eyelid (1971)  Tubal ligation (1947)  Appendectomy  Gallbladder  Hemorroidectomy  lap joe  removal of left ovary  Removal of Rt heel spur   Medications  Aspir 81 oral delayed release tablet, 81 mg= 1 tab(s), Oral, Daily  diclofenac sodium 75 mg oral delayed release tablet, See Instructions, 6 refills  hydrochlorothiazide-lisinopril 12.5 mg-10 mg oral tablet, 1 tab(s), Oral, Daily, 3 refills  methylPREDNISolone 4 mg oral tab, Oral, As Directed  Mobic 15 mg oral tablet, 15 mg= 1 tab(s), Oral, Daily  ranitidine 300 mg oral tablet, 300 mg= 1 tab(s), Oral, BID, 3 refills  Restasis 0.05% Eye Emulsion, 1 drop(s), Eye-Both, BID  valACYclovir 1 g oral tablet, 2 gm= 2 tab(s), Oral, BID, 1 refills  Vitamin D3 2000 intl units oral capsule, 1 tab(s), Daily  Allergies  Penicillin?(unknown)  sulfamethoxazole?(itcing, red splotches)  Social History  Abuse/Neglect  No, No, Yes, 09/12/2019  Alcohol - Low Risk, 06/27/2014  Current, Wine, 07/31/2012  Employment/School  Retired, 01/17/2019  Home/Environment  Lives with Spouse. Living situation: Home/Independent., 01/17/2019  Nutrition/Health  Type of diet: selective foods due to diverticulitis. Regular, 07/31/2012  Sexual  Sexually active: Yes., 06/17/2019  Substance Use - Denies Substance Abuse, 07/31/2012  Never, 01/17/2019  Tobacco - Denies Tobacco Use, 07/31/2012  Never (less than 100 in lifetime), N/A, Household tobacco concerns: No., 09/12/2019  Family History  Family history is negative  Immunizations  Vaccine Date Status   tetanus/diphtheria/pertussis, acel(Tdap) 01/01/2016 Recorded   Health Maintenance  Health Maintenance  ???Pending?(in the next year)  ??? ??OverDue  ??? ? ? ?Zoster Vaccine due??06/08/18??and every 100??year(s)  ??? ? ? ?Tetanus Vaccine  due??06/27/18??and every 10??year(s)  ??? ? ? ?Hypertension Management-BMP due??01/10/19??and every 1??year(s)  ??? ??Due?  ??? ? ? ?Pneumococcal Vaccine due??09/12/19??and every?  ??? ??Postponed?  ??? ? ? ?Pneumococcal Vaccine due??01/01/20??and every 1??day(s)  ??? ??Due In Future?  ??? ? ? ?Advance Directive not due until??01/01/20??and every 1??year(s)  ??? ? ? ?Alcohol Misuse Screening not due until??01/01/20??and every 1??year(s)  ??? ? ? ?Cognitive Screening not due until??01/01/20??and every 1??year(s)  ??? ? ? ?Fall Risk Assessment not due until??01/01/20??and every 1??year(s)  ??? ? ? ?Functional Assessment not due until??01/01/20??and every 1??year(s)  ??? ? ? ?Geriatric Depression Screening not due until??01/01/20??and every 1??year(s)  ??? ? ? ?Obesity Screening not due until??01/01/20??and every 1??year(s)  ??? ? ? ?Aspirin Therapy for CVD Prevention not due until??01/17/20??and every 1??year(s)  ??? ? ? ?Hypertension Management-Education not due until??01/17/20??and every 1??year(s)  ??? ? ? ?ADL Screening not due until??01/17/20??and every 1??year(s)  ??? ? ? ?Bone Density Screening not due until??02/09/20??and every 2??year(s)  ??? ? ? ?Hypertension Maintenance-Medication Prescribed not due until??06/17/20??and every 1??year(s)  ??? ? ? ?Hypertension Management-Blood Pressure not due until??08/27/20??and every 1??year(s)  ???Satisfied?(in the past 1 year)  ??? ??Satisfied?  ??? ? ? ?ADL Screening on??01/17/19.??Satisfied by Brittny Dowling LPN  ??? ? ? ?Advance Directive on??06/24/19.??Satisfied by Brittny Dowling LPN  ??? ? ? ?Alcohol Misuse Screening on??01/17/19.??Satisfied by Brittny Dowling LPN  ??? ? ? ?Aspirin Therapy for CVD Prevention on??01/17/19.??Satisfied by Brittny Dowling LPN  ??? ? ? ?Blood Pressure Screening on??09/12/19.??Satisfied by Hussain Peterson  ??? ? ? ?Body Mass Index Check on??09/12/19.??Satisfied by Hussain Peterson  ??? ? ? ?Breast Cancer Screening ([facility])  on??02/12/19.??Satisfied by Amina Diaz Ray  ??? ? ? ?Cognitive Screening on??01/17/19.??Satisfied by Brittny Dowling LPN  ??? ? ? ?Depression Screening on??01/17/19.??Satisfied by Brittny Dowling LPN  ??? ? ? ?Fall Risk Assessment on??01/17/19.??Satisfied by Brittny Dowling LPN  ??? ? ? ?Functional Assessment on??01/17/19.??Satisfied by Brittny Dowling LPN  ??? ? ? ?Geriatric Depression Screening on??01/17/19.??Satisfied by Brittny Dowling LPN  ??? ? ? ?Hypertension Management-Blood Pressure on??09/12/19.??Satisfied by Hussain Peterson  ??? ? ? ?Hypertension Maintenance-Medication Prescribed on??06/17/19.??Satisfied by Brittny Dowling LPN  ??? ? ? ?Obesity Screening on??09/12/19.??Satisfied by Hussain Peterson  ?  Diagnostic Results  AP lateral right hip reviewed. ?Patient with?significant joint space narrowing is judged on the x-ray as well as CT scan to her right hip.

## 2022-05-04 NOTE — HISTORICAL [facility]
This is a historical note converted from Billy. Formatting and pictures may have been removed.  Please reference Billy for original formatting and attached multimedia. Chief Complaint  f/u right hip / pt had sx 12/4/19 pt states she fell in nov 20. both hips are hurting but the rigt is causing her more pain.  History of Present Illness  75-year-old female status post right hip arthroplasty. ?Patient?was doing excellent when she tripped over a cord landed on her elbows had bilateral olecranon fractures?treated by another physician. ?Since that time she has had some lateral right hip pain. ?Denies any radiations proximally or distally. ?Also with some left hip pain although it is much more minor.? She is able to ambulate. ?She uses no assistive device. ?She has tried anti-inflammatories?orally.? No therapy. ?No injections.  Review of Systems  Denies fevers, chills, chest pain, shortness of breath. Comprehensive review of systems performed and otherwise negative except as noted in HPI.  Physical Exam  Vitals & Measurements  T:?97? ?F (Oral)? HR:?94(Peripheral)? BP:?149/84?  HT:?157.48?cm? WT:?49.950?kg? BMI:?20.14?  General: No acute distress, alert and oriented, healthy appearing?  HEENT: Head is atraumatic, mucous membranes are moist?  Cardiovascular: Brisk capillary refill  Lungs: Breathing non-labored?  Skin: no rashes appreciated?  Neurologic: Sensation is grossly intact distally  ?  Right hip:  Point tenderness laterally over trochanteric bursa. ?Brisk cap refill distally. ?Sensation tact distally.? Patient with a negative Stinchfield. ?Internal and external rotation cause her no significant issues or pain.  Assessment/Plan  1.?Aftercare following joint replacement?Z47.1  ?Patient with a flareup of trochanteric bursitis likely hemorrhagic?following her fall. ?We will give her an injection to calm this down. ?If this fails to improve her symptoms, we could try some physical therapy. ?She will contact us if she  has ongoing issues with regards to the right hip.? Otherwise we will see her back on as-needed basis.  ?  After verbal consent was obtained the patients right right lateral trochanter was prepped with Betadine and anesthetized with ethyl chloride over point of maximal tenderness. The left trochanteric bursa was then injected under sterile technique with 2 mL of 2% lidocaine and 12 mg betamethasone. It was dressed with a Band-Aid. The patient received good relief from the injection and was able to ambulate normally from clinic.  ?  Ordered:  Clinic Follow-up PRN, 03/02/21 16:36:00 CST, Future Order, Orthopaedics  Office/Outpatient Visit Level 3 Established [id number] PC, Trochanteric bursitis, right hip  Aftercare following joint replacement, Orthopaedics Clinic, 03/02/21 16:35:00 CST  ?  2.?Trochanteric bursitis, right hip?M70.61  Ordered:  betamethasone, 12 mg, Intra-Articular, Once, first dose 03/02/21 17:00:00 CST, stop date 03/02/21 17:00:00 CST  Lidocaine inj., 2 mL, Intra-Articular, Once, first dose 03/02/21 16:35:00 CST, stop date 03/02/21 16:35:00 CST  asp/inj jnt/bursa, major [id number] PC, 03/02/21 16:35:00 CST, Orthopaedics Clinic, Routine, 03/02/21 16:35:00 CST, Trochanteric bursitis, right hip  Clinic Follow-up PRN, 03/02/21 16:36:00 CST, Future Order, Orthopaedics  Office/Outpatient Visit Level 3 Established [id number] PC, Trochanteric bursitis, right hip  Aftercare following joint replacement, Orthopaedics Clinic, 03/02/21 16:35:00 CST  ?  Referrals  Clinic Follow-up PRN, 03/02/21 16:36:00 CST, Future Order, Orthopaedics   Problem List/Past Medical History  Ongoing  Abnormal glucose  Amblyopia  Bilateral age-related nuclear cataracts  Chronic idiopathic constipation  Constipation  GERD (gastroesophageal reflux disease)  Hyperlipidemia, mixed  Hypertension  Left knee pain  Migraines  Need for pneumococcal vaccine  Osteopenia  Right hip pain  Well adult exam  Historical  Acute right eye pain  Acute sinus  infection  Cholelithiasis  Diverticulitis  Dry eyes  elevated cholestrol  kidney stones  Panic attacks  Screening mammogram, encounter for  Stage III chronic kidney disease  Wears glasses  Procedure/Surgical History  Mammogram (06/30/2020)  EMILY ROBERTS Total Hip Arthroplasty (Right) (12/04/2019)  Replacement of Right Hip Joint with Synthetic Substitute, Uncemented, Open Approach (12/04/2019)  Robotic Assisted Procedure of Trunk Region, Open Approach (12/04/2019)  Arthrocentesis, aspiration and/or injection, major joint or bursa (eg, shoulder, hip, knee, subacromial bursa); without ultrasound guidance (10/07/2019)  Fluoroscopy of Left Hip using Low Osmolar Contrast (10/07/2019)  Injection Hip (Right) (10/07/2019)  Injection Knee (Bilateral) (10/07/2019)  Introduction of Analgesics, Hypnotics, Sedatives into Joints, Percutaneous Approach (10/07/2019)  Introduction of Anti-inflammatory into Joints, Percutaneous Approach (10/07/2019)  Bravo PH (07/01/2014)  Esophagogastroduodenoscopy (07/01/2014)  Esophagogastroduodenoscopy, flexible, transoral; diagnostic, including collection of specimen(s) by brushing or washing, when performed (separate procedure) (07/01/2014)  Other endoscopy of small intestine (07/01/2014)  Laparoscopic cholecystectomy (08/01/2012)  Laparoscopy, surgical; cholecystectomy. (08/01/2012)  Appendectomy (01/01/1997)  reconstruction of Rt eyelid (1971)  Tubal ligation (1947)  Appendectomy  Cholecystectomy  Eyelid Surgery  Gallbladder  Hemorroidectomy  lap joe  removal of left ovary  Removal of Rt heel spur   Medications  aspirin 81 mg oral Delayed Release (EC) tablet, 81 mg= 1 tab(s), Oral, Daily  calcium-vitamin D 600 mg-400 intl units oral tablet, 1 tab(s), Oral, BID  Celestone, 12 mg, Intra-Articular, Once  diclofenac sodium 75 mg oral delayed release tablet, See Instructions  lidocaine 2% injectable solution, 2 mL, Intra-Articular, Once  lisinopril 10 mg oral tablet, 10 mg= 1 tab(s), Oral, Daily, 3  refills  Lunesta 3 mg oral tablet, 3 mg= 1 tab(s), Oral, Once a day (at bedtime), PRN, 5 refills  omeprazole 40 mg oral DR capsule, See Instructions  ondansetron 4 mg oral tablet, 4 mg= 1 tab(s), Oral, q6hr, PRN  Restasis 0.05% Eye Emulsion, 1 drop(s), Eye-Both, BID  traMADol 50 mg oral tablet, 50 mg, Oral, q4hr, PRN  Trulance 3 mg oral tablet, 3 mg= 1 tab(s), Oral, Daily  valACYclovir 1 g oral tablet, 2 gm= 2 tab(s), Oral, BID, 1 refills,? ?Still taking, not as prescribed: takes 1 gram (1 tab BID PRN fever blisters)  Zetia 10 mg oral tablet, 10 mg= 1 tab(s), Oral, Daily, 3 refills  Allergies  penicillin G benzathine?(Unknown)  sulfamethoxazole?(itcing, red splotches)  Social History  Abuse/Neglect  No, 02/09/2021  Alcohol - Low Risk, 06/27/2014  Current, 1-2 times per year, 09/24/2019  Employment/School  Retired, 01/17/2019  Financial/Legal Situation  None, 01/19/2021  Home/Environment  Lives with Spouse. Living situation: Home/Independent., 01/17/2019    Never in , 01/19/2021  Nutrition/Health  Regular, 11/12/2019  Sexual  Sexually active: Yes., 06/17/2019  Spiritual/Cultural  [place], 09/24/2019  Substance Use - Denies Substance Abuse, 07/31/2012  Never, 09/24/2019  Tobacco - Denies Tobacco Use, 07/31/2012  Never (less than 100 in lifetime), N/A, Household tobacco concerns: No. Smokeless Tobacco Use: Never., 02/09/2021  Family History  Primary malignant neoplasm of breast: Sister.  Immunizations  Vaccine Date Status   COVID-19 MRNA, LNP-S, PF- Pfizer 02/10/2021 Given   pneumococcal 23-polyvalent vaccine 01/19/2021 Given   influenza virus vaccine, inactivated 12/07/2020 Recorded   pneumococcal 13-valent conjugate vaccine 01/15/2020 Given   tetanus/diphtheria/pertussis, acel(Tdap) 01/01/2016 Recorded   Health Maintenance  Health Maintenance  ???Pending?(in the next year)  ??? ??OverDue  ??? ? ? ?Tetanus Vaccine due??06/27/18??and every 10??year(s)  ??? ? ? ?Bone Density Screening due??02/09/20??and  every 2??year(s)  ??? ??Due?  ??? ? ? ?Zoster Vaccine due??03/02/21??Unknown Frequency  ??? ??Due In Future?  ??? ? ? ?Influenza Vaccine not due until??10/01/21??and every 1??day(s)  ??? ? ? ?Obesity Screening not due until??01/01/22??and every 1??year(s)  ??? ? ? ?Advance Directive not due until??01/02/22??and every 1??year(s)  ??? ? ? ?Alcohol Misuse Screening not due until??01/02/22??and every 1??year(s)  ??? ? ? ?Cognitive Screening not due until??01/02/22??and every 1??year(s)  ??? ? ? ?Fall Risk Assessment not due until??01/02/22??and every 1??year(s)  ??? ? ? ?Functional Assessment not due until??01/02/22??and every 1??year(s)  ??? ? ? ?ADL Screening not due until??01/05/22??and every 1??year(s)  ??? ? ? ?Hypertension Management-BMP not due until??01/12/22??and every 1??year(s)  ??? ? ? ?Medicare Annual Wellness Exam not due until??01/19/22??and every 1??year(s)  ??? ? ? ?Hypertension Management-Education not due until??01/19/22??and every 1??year(s)  ??? ? ? ?Blood Pressure Screening not due until??02/09/22??and every 1??year(s)  ??? ? ? ?Body Mass Index Check not due until??02/09/22??and every 1??year(s)  ??? ? ? ?Depression Screening not due until??02/09/22??and every 1??year(s)  ??? ? ? ?Hypertension Management-Blood Pressure not due until??02/09/22??and every 1??year(s)  ???Satisfied?(in the past 1 year)  ??? ??Satisfied?  ??? ? ? ?ADL Screening on??01/05/21.??Satisfied by Brittny Dowling LPN  ??? ? ? ?Advance Directive on??01/19/21.??Satisfied by Brittny Dowling LPN  ??? ? ? ?Alcohol Misuse Screening on??01/05/21.??Satisfied by Brittny Dowling LPN  ??? ? ? ?Aspirin Therapy for CVD Prevention on??03/02/21.  ??? ? ? ?Blood Pressure Screening on??03/02/21.??Satisfied by Claudine Trent  ??? ? ? ?Body Mass Index Check on??03/02/21.??Satisfied by Claudine Trent  ??? ? ? ?Breast Cancer Screening on??06/30/20.??Satisfied by Shukri Costa MD  ??? ? ? ?Cognitive Screening on??02/09/21.??Satisfied by Nitesh MATTA,  Brittny  ??? ? ? ?Depression Screening on??03/02/21.??Satisfied by Claudine Trent  ??? ? ? ?Diabetes Screening on??01/12/21.??Satisfied by Lis Farrell  ??? ? ? ?Fall Risk Assessment on??03/02/21.??Satisfied by Claudine Trent  ??? ? ? ?Functional Assessment on??03/02/21.??Satisfied by Claudine Trent  ??? ? ? ?Hypertension Management-Blood Pressure on??03/02/21.??Satisfied by Claudine Trent  ??? ? ? ?Influenza Vaccine on??12/07/20.??Satisfied by Brittny Dowling LPN  ??? ? ? ?Lipid Screening on??01/12/21.??Satisfied by Lis Farrell  ??? ? ? ?Medicare Annual Wellness Exam on??01/19/21.??Satisfied by Shukri Costa MD  ??? ? ? ?Obesity Screening on??03/02/21.??Satisfied by Claudine Trent  ??? ? ? ?Pneumococcal Vaccine on??01/19/21.??Satisfied by Brittny Dowling LPN  ?  Diagnostic Results  AP lateral right hip reviewed. ?Patient implants well fixed. ?No signs of loosening or subsidence noted?no fracture seen.

## 2022-05-04 NOTE — HISTORICAL [facility]
This is a historical note converted from Billy. Formatting and pictures may have been removed.  Please reference Billy for original formatting and attached multimedia. Chief Complaint  2 month f/u right total hip, sx 12/4/19. Pt states she is having some pain in the right hip and thigh area.  History of Present Illness  74-year-old female presents here for follow-up after total knee right side. ?Patient is about 6 months out. ?Overall doing very well. ?Began developing some?right lateral hip pain as well as?some mild thigh pain at times. ?Denies any significant start up pain. ?Pain is?much improved?from her preoperative levels and she is happy with her recovery. ?She is still very active?in the yard and garden.  Review of Systems  Denies fevers, chills, chest pain, shortness of breath. Comprehensive review of systems performed and otherwise negative except as noted in HPI.  Physical Exam  Vitals & Measurements  T:?37.1? ?C (Oral)? HR:?84(Peripheral)? BP:?108/72?  HT:?157.48?cm? WT:?53.6?kg? BMI:?21.61?  General: No acute distress, alert and oriented, healthy appearing?  HEENT: Head is atraumatic, mucous membranes are moist  Cardiovascular: Brisk capillary refill  Lungs: Breathing non-labored  Skin: no rashes appreciated  Neurologic: Sensation is grossly intact distally  ?  Right hip:  Patient with no pain with range of motion the right hip. ?No pain with circumduction. ?Negative Stinchfield. ?She has excellent range of motion with flexion?greater than 100 degrees and internal and external rotation 20 degrees both directions.? Also with some significant tenderness laterally over trochanteric bursa.  Assessment/Plan  1.?Aftercare following joint replacement?Z47.1  ?Patients hip is well fixed and appears to be well ingrown x-rays.? No issues with the implants?noted today.  Ordered:  Clinic Follow up, *Est. 07/03/20 3:00:00 CDT, Order for future visit, Aftercare following joint replacement  Trochanteric bursitis,  right hip, Orthopaedics  Office/Outpatient Visit Level 3 Established [id number] , Aftercare following joint replacement  Trochanteric bursitis, right hip, Orthopaedics Clinic, 06/12/20 9:42:00 CDT  ?  2.?Trochanteric bursitis, right hip?M70.61  ?We will try an injection intertrochanteric bursa to calm this down. ?We will contact her with a telephone message in 3 weeks to check her progress.  ?  After verbal consent was obtained the patients right right lateral trochanter was prepped with Betadine and anesthetized with ethyl chloride over point of maximal tenderness. The left trochanteric bursa was then injected under sterile technique with 2 mL of 2% lidocaine and 12 mg betamethasone. It was dressed with a Band-Aid. The patient received good relief from the injection and was able to ambulate normally from clinic.  ?  Ordered:  betamethasone, 12 mg, Intra-Articular, Once, first dose 06/12/20 10:00:00 CDT, stop date 06/12/20 10:00:00 CDT  Lidocaine inj., 2 mL, Intra-Articular, Once, first dose 06/12/20 9:42:00 CDT, stop date 06/12/20 9:42:00 CDT  asp/inj jnt/bursa, major [id number] , 06/12/20 9:42:00 CDT, Orthopaedics Clinic, Routine, 06/12/20 9:42:00 CDT, Trochanteric bursitis, right hip  Clinic Follow up, *Est. 07/03/20 3:00:00 CDT, Order for future visit, Aftercare following joint replacement  Trochanteric bursitis, right hip, Orthopaedics  Office/Outpatient Visit Level 3 Established [id number] , Aftercare following joint replacement  Trochanteric bursitis, right hip, Orthopaedics Clinic, 06/12/20 9:42:00 CDT  ?  Referrals  Clinic Follow up, *Est. 07/03/20 3:00:00 CDT, Order for future visit, Aftercare following joint replacement  Trochanteric bursitis, right hip, Orthopaedics   Problem List/Past Medical History  Ongoing  Abnormal glucose  Amblyopia  Bilateral age-related nuclear cataracts  GERD (gastroesophageal reflux disease)  Hyperlipidemia, mixed  Hypertension  Left knee pain  Migraines  Need for  pneumococcal vaccine  Osteopenia  Right hip pain  Screening mammogram, encounter for  Well adult exam  Historical  Acute right eye pain  Acute sinus infection  Cholelithiasis  Diverticulitis  Dry eyes  elevated cholestrol  kidney stones  Panic attacks  Wears glasses  Procedure/Surgical History  EMILY ROBERTS Total Hip Arthroplasty (Right) (12/04/2019)  Replacement of Right Hip Joint with Synthetic Substitute, Uncemented, Open Approach (12/04/2019)  Robotic Assisted Procedure of Trunk Region, Open Approach (12/04/2019)  Arthrocentesis, aspiration and/or injection, major joint or bursa (eg, shoulder, hip, knee, subacromial bursa); without ultrasound guidance (10/07/2019)  Fluoroscopy of Left Hip using Low Osmolar Contrast (10/07/2019)  Injection Hip (Right) (10/07/2019)  Injection Knee (Bilateral) (10/07/2019)  Introduction of Analgesics, Hypnotics, Sedatives into Joints, Percutaneous Approach (10/07/2019)  Introduction of Anti-inflammatory into Joints, Percutaneous Approach (10/07/2019)  Bravo PH (07/01/2014)  Esophagogastroduodenoscopy (07/01/2014)  Esophagogastroduodenoscopy, flexible, transoral; diagnostic, including collection of specimen(s) by brushing or washing, when performed (separate procedure) (07/01/2014)  Other endoscopy of small intestine (07/01/2014)  Laparoscopic cholecystectomy (08/01/2012)  Laparoscopy, surgical; cholecystectomy. (08/01/2012)  reconstruction of Rt eyelid (1971)  Tubal ligation (1947)  Appendectomy  Gallbladder  Hemorroidectomy  lap joe  removal of left ovary  Removal of Rt heel spur   Medications  calcium-vitamin D 600 mg-400 intl units oral tablet, 1 tab(s), Oral, BID  Celestone, 12 mg, Intra-Articular, Once  diclofenac sodium 75 mg oral delayed release tablet, 75 mg= 1 tab(s), Oral, BID  hydrochlorothiazide-lisinopril 12.5 mg-10 mg oral tablet, See Instructions, 3 refills  levoFLOXacin 500 mg oral tablet, 500 mg= 1 tab(s), Oral, Daily  lidocaine 2% injectable solution, 2 mL,  Intra-Articular, Once  Lunesta 3 mg oral tablet, 3 mg= 1 tab(s), Oral, Once a day (at bedtime), PRN, 1 refills  methocarbamol 750 mg oral tablet, 750 mg= 1 tab(s), Oral, TID,? ?Not Taking, Completed Rx  omeprazole 40 mg oral DR capsule, 40 mg= 1 cap(s), Oral, Daily, 11 refills  Restasis 0.05% Eye Emulsion, 1 drop(s), Eye-Both, BID  traMADol 50 mg oral tablet, 50 mg= 1 tab(s), Oral, q4hr,? ?Not Taking, Completed Rx  valACYclovir 1 g oral tablet, 2 gm= 2 tab(s), Oral, BID, 1 refills,? ?Still taking, not as prescribed: takes 1 gram (1 tab BID PRN fever blisters)  Vitamin D3 2000 intl units oral capsule, 1 tab(s), Daily,? ?Still taking, not as prescribed: Takes 1000 IU daily  Allergies  penicillin G benzathine?(Unknown)  sulfamethoxazole?(itcing, red splotches)  Social History  Abuse/Neglect  No, 06/12/2020  Alcohol - Low Risk, 06/27/2014  Current, 1-2 times per year, 09/24/2019  Employment/School  Retired, 01/17/2019  Home/Environment  Lives with Spouse. Living situation: Home/Independent., 01/17/2019  Nutrition/Health  Regular, 11/12/2019  Sexual  Sexually active: Yes., 06/17/2019  Spiritual/Cultural  [place], 09/24/2019  Substance Use - Denies Substance Abuse, 07/31/2012  Never, 09/24/2019  Tobacco - Denies Tobacco Use, 07/31/2012  Never (less than 100 in lifetime), N/A, Household tobacco concerns: No., 06/12/2020  Family History  Primary malignant neoplasm of breast: Sister.  Immunizations  Vaccine Date Status   pneumococcal 13-valent conjugate vaccine 01/15/2020 Given   tetanus/diphtheria/pertussis, acel(Tdap) 01/01/2016 Recorded   Health Maintenance  Health Maintenance  ???Pending?(in the next year)  ??? ??OverDue  ??? ? ? ?Pneumococcal Vaccine due??and every?  ??? ? ? ?Zoster Vaccine due??06/08/18??and every 100??year(s)  ??? ? ? ?Tetanus Vaccine due??06/27/18??and every 10??year(s)  ??? ? ? ?Bone Density Screening due??02/09/20??and every 2??year(s)  ??? ??Due In Future?  ??? ? ? ?Hypertension  Maintenance-Medication Prescribed not due until??06/17/20??and every 1??year(s)  ??? ? ? ?Aspirin Therapy for CVD Prevention not due until??12/05/20??and every 1??year(s)  ??? ? ? ?Advance Directive not due until??01/01/21??and every 1??year(s)  ??? ? ? ?Alcohol Misuse Screening not due until??01/01/21??and every 1??year(s)  ??? ? ? ?Cognitive Screening not due until??01/01/21??and every 1??year(s)  ??? ? ? ?Fall Risk Assessment not due until??01/01/21??and every 1??year(s)  ??? ? ? ?Functional Assessment not due until??01/01/21??and every 1??year(s)  ??? ? ? ?Obesity Screening not due until??01/01/21??and every 1??year(s)  ??? ? ? ?Hypertension Management-Blood Pressure not due until??01/14/21??and every 1??year(s)  ??? ? ? ?Hypertension Management-BMP not due until??01/14/21??and every 1??year(s)  ??? ? ? ?Medicare Annual Wellness Exam not due until??01/15/21??and every 1??year(s)  ??? ? ? ?ADL Screening not due until??01/15/21??and every 1??year(s)  ??? ? ? ?Hypertension Management-Education not due until??01/15/21??and every 1??year(s)  ??? ? ? ?Breast Cancer Screening (Senior Wellness) not due until??02/11/21??and every 2??year(s)  ???Satisfied?(in the past 1 year)  ??? ??Satisfied?  ??? ? ? ?ADL Screening on??01/15/20.??Satisfied by Brittny Dowling LPN  ??? ? ? ?Advance Directive on??01/15/20.??Satisfied by Brittny Dowling LPN  ??? ? ? ?Alcohol Misuse Screening on??01/15/20.??Satisfied by Brittny Dowling LPN  ??? ? ? ?Aspirin Therapy for CVD Prevention on??12/05/19.??Satisfied by Eve Martinez NP  ??? ? ? ?Blood Pressure Screening on??06/12/20.??Satisfied by Yvette Kay  ??? ? ? ?Body Mass Index Check on??06/12/20.??Satisfied by Yvette Kay  ??? ? ? ?Cognitive Screening on??01/15/20.??Satisfied by Brittny Dowling LPN  ??? ? ? ?Depression Screening on??01/15/20.??Satisfied by Brittny Dowling LPN  ??? ? ? ?Diabetes Screening on??01/15/20.??Satisfied by JEROME CHOWDHURY  ??? ? ? ?Fall Risk  Assessment on??01/15/20.??Satisfied by Brittny Dowling LPN  ??? ? ? ?Functional Assessment on??01/15/20.??Satisfied by Brittny Dowling LPN  ??? ? ? ?Hypertension Management-Blood Pressure on??06/12/20.??Satisfied by Yvette Kay  ??? ? ? ?Hypertension Maintenance-Medication Prescribed on??06/17/19.??Satisfied by Brittny Dowling LPN  ??? ? ? ?Influenza Vaccine on??12/04/19.??Satisfied by Yassine CHAUDHARY, Lanie ROGER.  ??? ? ? ?Lipid Screening on??01/15/20.??Satisfied by Richard Pacheco  ??? ? ? ?Medicare Annual Wellness Exam on??01/15/20.??Satisfied by Shukri Costa MD  ??? ? ? ?Obesity Screening on??06/12/20.??Satisfied by Yvette Kay  ??? ? ? ?Pneumococcal Vaccine on??01/15/20.??Satisfied by Dami Whiting  ?  Diagnostic Results  AP lateral right hip reviewed. ?Patient with?well fixed implants no signs of loosening or subsidence noted.

## 2022-05-04 NOTE — HISTORICAL [facility]
This is a historical note converted from [place]. Formatting and pictures may have been removed.  Please reference Billy for original formatting and attached multimedia. History of Present Illness  72-year-old female presents for follow-up of total hip arthroplasty on the right side. ?Patient is 2 months out. ?She is overall doing quite well.? No significant complaints of pain. ?She is happy with her recovery thus far?and continues to make steady improvements.  Review of Systems  Denies fevers, chills, chest pain, shortness of breath. Comprehensive review of systems performed and otherwise negative except as noted in HPI.  Physical Exam  General: No acute distress, alert and oriented, healthy appearing?  HEENT: Head is atraumatic, mucous membranes are moist  Cardiovascular: Brisk capillary refill  Lungs: Breathing non-labored  Skin: no rashes appreciated  Neurologic: Sensation is grossly intact distally  ?  Hip exam:  Right?hip incision clean dry and intact. No erythema, drainage, or signs of infection  No swelling distally. No signs of DVT  No pain with logroll  Sensation intact distally to right foot  Positive FHL/EHL/gastrocsoleus/tib ant brisk capillary refill right foot  ?  Assessment/Plan  1.?Aftercare following joint replacement?Z47.1  ?Patient doing very well following hip arthroplasty in the right side. ?She is very satisfied with her recovery. ?We will plan to see her back?in 2 months or sooner should to be any issues.? We did discuss the current recommendations regarding antibiotic prophylaxis prior to any dental work or colonoscopies. Patient is aware of this and will contact her office should they need any prescriptions for antibiotics called in.  Ordered:  Clinic Follow up, *Est. 03/23/20 3:00:00 CDT, Order for future visit, Aftercare following joint replacement, Orthopaedics  Post-Op follow-up visit [id number] PC, Aftercare following joint replacement, Orthopaedics, 01/23/20 16:07:00 CST  XR Hip Right  2 Views, Routine, *Est. 03/23/20 3:00:00 CDT, Arthritis, None, Patient Bed, Patient Has IV?, Rad Type, Order for future visit, Aftercare following joint replacement, Not Scheduled, *Est. 03/23/20 3:00:00 CDT  ?  Referrals  Clinic Follow up, *Est. 03/23/20 3:00:00 CDT, Order for future visit, Aftercare following joint replacement, [facility]   Problem List/Past Medical History  Ongoing  Abnormal glucose  Amblyopia  Bilateral age-related nuclear cataracts  GERD (gastroesophageal reflux disease)  Hyperlipidemia, mixed  Hypertension  Left knee pain  Migraines  Need for pneumococcal vaccine  Osteopenia  Right hip pain  Screening mammogram, encounter for  Well adult exam  Historical  Acute right eye pain  Acute sinus infection  Cholelithiasis  Diverticulitis  Dry eyes  elevated cholestrol  kidney stones  Panic attacks  Wears glasses  Procedure/Surgical History  EMILY ROBERTS Total Hip Arthroplasty (Right) (12/04/2019)  Replacement of Right Hip Joint with Synthetic Substitute, Uncemented, Open Approach (12/04/2019)  Robotic Assisted Procedure of Trunk Region, Open Approach (12/04/2019)  Arthrocentesis, aspiration and/or injection, major joint or bursa (eg, shoulder, hip, knee, subacromial bursa); without ultrasound guidance (10/07/2019)  Fluoroscopy of Left Hip using Low Osmolar Contrast (10/07/2019)  Injection Hip (Right) (10/07/2019)  Injection Knee (Bilateral) (10/07/2019)  Introduction of Analgesics, Hypnotics, Sedatives into Joints, Percutaneous Approach (10/07/2019)  Introduction of Anti-inflammatory into Joints, Percutaneous Approach (10/07/2019)  Bravo PH (07/01/2014)  Esophagogastroduodenoscopy (07/01/2014)  Esophagogastroduodenoscopy, flexible, transoral; diagnostic, including collection of specimen(s) by brushing or washing, when performed (separate procedure) (07/01/2014)  Other endoscopy of small intestine (07/01/2014)  Laparoscopic cholecystectomy (08/01/2012)  Laparoscopy, surgical; cholecystectomy.  (08/01/2012)  reconstruction of Rt eyelid (1971)  Tubal ligation (1947)  Appendectomy  Gallbladder  Hemorroidectomy  lap joe  removal of left ovary  Removal of Rt heel spur   Medications  calcium-vitamin D 600 mg-400 intl units oral tablet, 1 tab(s), Oral, BID  diclofenac sodium 75 mg oral delayed release tablet, 75 mg= 1 tab(s), Oral, BID  famotidine 20 mg oral tablet, 20 mg= 1 tab(s), Oral, BID, 11 refills  hydrochlorothiazide-lisinopril 12.5 mg-10 mg oral tablet, See Instructions, 3 refills  levoFLOXacin 500 mg oral tablet, 500 mg= 1 tab(s), Oral, Daily  Lunesta 3 mg oral tablet, 3 mg= 1 tab(s), Oral, Once a day (at bedtime), PRN, 1 refills  methocarbamol 750 mg oral tablet, 750 mg= 1 tab(s), Oral, TID  ranitidine 300 mg oral tablet, 300 mg= 1 tab(s), Oral, BID  Restasis 0.05% Eye Emulsion, 1 drop(s), Eye-Both, BID  traMADol 50 mg oral tablet, 50 mg= 1 tab(s), Oral, q4hr  valACYclovir 1 g oral tablet, 2 gm= 2 tab(s), Oral, BID, 1 refills,? ?Still taking, not as prescribed: takes 1 gram (1 tab BID PRN fever blisters)  Vitamin D3 2000 intl units oral capsule, 1 tab(s), Daily,? ?Still taking, not as prescribed: Takes 1000 IU daily  Allergies  sulfamethoxazole?(itcing, red splotches)  Social History  Abuse/Neglect  No, 01/15/2020  Alcohol - Low Risk, 06/27/2014  Current, 1-2 times per year, 09/24/2019  Employment/School  Retired, 01/17/2019  Home/Environment  Lives with Spouse. Living situation: Home/Independent., 01/17/2019  Nutrition/Health  Regular, 11/12/2019  Sexual  Sexually active: Yes., 06/17/2019  Spiritual/Cultural  Tenriism, 09/24/2019  Substance Use - Denies Substance Abuse, 07/31/2012  Never, 09/24/2019  Tobacco - Denies Tobacco Use, 07/31/2012  Never (less than 100 in lifetime), N/A, Household tobacco concerns: No., 01/15/2020  Family History  Primary malignant neoplasm of breast: Sister.  Immunizations  Vaccine Date Status   pneumococcal 13-valent conjugate vaccine 01/15/2020 Given    tetanus/diphtheria/pertussis, acel(Tdap) 01/01/2016 Recorded   Health Maintenance  Health Maintenance  ???Pending?(in the next year)  ??? ??OverDue  ??? ? ? ?Pneumococcal Vaccine due??and every?  ??? ? ? ?Zoster Vaccine due??06/08/18??and every 100??year(s)  ??? ? ? ?Tetanus Vaccine due??06/27/18??and every 10??year(s)  ??? ??Due In Future?  ??? ? ? ?Hypertension Maintenance-Medication Prescribed not due until??06/17/20??and every 1??year(s)  ??? ? ? ?Aspirin Therapy for CVD Prevention not due until??12/05/20??and every 1??year(s)  ??? ? ? ?Advance Directive not due until??01/01/21??and every 1??year(s)  ??? ? ? ?Alcohol Misuse Screening not due until??01/01/21??and every 1??year(s)  ??? ? ? ?Cognitive Screening not due until??01/01/21??and every 1??year(s)  ??? ? ? ?Fall Risk Assessment not due until??01/01/21??and every 1??year(s)  ??? ? ? ?Functional Assessment not due until??01/01/21??and every 1??year(s)  ??? ? ? ?Geriatric Depression Screening not due until??01/01/21??and every 1??year(s)  ??? ? ? ?Obesity Screening not due until??01/01/21??and every 1??year(s)  ??? ? ? ?Hypertension Management-Blood Pressure not due until??01/14/21??and every 1??year(s)  ??? ? ? ?Hypertension Management-BMP not due until??01/14/21??and every 1??year(s)  ??? ? ? ?ADL Screening not due until??01/15/21??and every 1??year(s)  ??? ? ? ?Hypertension Management-Education not due until??01/15/21??and every 1??year(s)  ???Satisfied?(in the past 1 year)  ??? ??Satisfied?  ??? ? ? ?ADL Screening on??01/15/20.??Satisfied by Brittny Dowling LPN  ??? ? ? ?Advance Directive on??01/15/20.??Satisfied by Brittny Dowling LPN  ??? ? ? ?Alcohol Misuse Screening on??01/15/20.??Satisfied by Brittny Dowling LPN  ??? ? ? ?Aspirin Therapy for CVD Prevention on??12/05/19.??Satisfied by Eve Martinez NP  ??? ? ? ?Blood Pressure Screening on??01/15/20.??Satisfied by Brittny Dowling LPN  ??? ? ? ?Body Mass Index Check on??01/15/20.??Satisfied by  Nitesh MATTA Brittny  ??? ? ? ?Breast Cancer Screening ([facility]) on??02/12/19.??Satisfied by Amina Diaz  ??? ? ? ?Cognitive Screening on??01/15/20.??Satisfied by Nitesh MATTA Brittny  ??? ? ? ?Depression Screening on??01/15/20.??Satisfied by Nitesh MATTA, Brittny  ??? ? ? ?Diabetes Screening on??01/15/20.??Satisfied by JEROME CHOWDHURY  ??? ? ? ?Fall Risk Assessment on??01/15/20.??Satisfied by Nitesh MATTA, Brittny  ??? ? ? ?Functional Assessment on??01/15/20.??Satisfied by Nitesh MATTA Brittny  ??? ? ? ?Geriatric Depression Screening on??01/15/20.??Satisfied by Nitesh MATTA Brittny  ??? ? ? ?Hypertension Management-Education on??01/15/20.??Satisfied by Nitesh MATTA Brittny  ??? ? ? ?Hypertension Maintenance-Medication Prescribed on??06/17/19.??Satisfied by Nitesh MATTA Brittny  ??? ? ? ?Influenza Vaccine on??12/04/19.??Satisfied by Lanie Metzger RN  ??? ? ? ?Lipid Screening on??01/15/20.??Satisfied by Richard Pacheco  ??? ? ? ?Obesity Screening on??01/15/20.??Satisfied by Zion Dowling LPNa  ??? ? ? ?Pneumococcal Vaccine on??01/15/20.??Satisfied by Dami Whiting  ?  Diagnostic Results  AP lateral right hip reviewed. ?Patients implants well fixed with no signs of loosening or subsidence noted.

## 2022-05-23 ENCOUNTER — OFFICE VISIT (OUTPATIENT)
Dept: PRIMARY CARE CLINIC | Facility: CLINIC | Age: 77
End: 2022-05-23
Payer: MEDICARE

## 2022-05-23 VITALS
RESPIRATION RATE: 18 BRPM | HEIGHT: 62 IN | OXYGEN SATURATION: 98 % | WEIGHT: 109 LBS | DIASTOLIC BLOOD PRESSURE: 70 MMHG | HEART RATE: 64 BPM | SYSTOLIC BLOOD PRESSURE: 110 MMHG | BODY MASS INDEX: 20.06 KG/M2

## 2022-05-23 DIAGNOSIS — F51.01 PRIMARY INSOMNIA: ICD-10-CM

## 2022-05-23 DIAGNOSIS — Z86.19 HX OF COLD SORES: ICD-10-CM

## 2022-05-23 DIAGNOSIS — K59.04 CHRONIC IDIOPATHIC CONSTIPATION: ICD-10-CM

## 2022-05-23 DIAGNOSIS — Z12.31 ENCOUNTER FOR SCREENING MAMMOGRAM FOR BREAST CANCER: ICD-10-CM

## 2022-05-23 DIAGNOSIS — K21.9 GASTROESOPHAGEAL REFLUX DISEASE WITHOUT ESOPHAGITIS: ICD-10-CM

## 2022-05-23 DIAGNOSIS — I10 PRIMARY HYPERTENSION: ICD-10-CM

## 2022-05-23 DIAGNOSIS — Z76.89 ENCOUNTER TO ESTABLISH CARE WITH NEW DOCTOR: Primary | ICD-10-CM

## 2022-05-23 DIAGNOSIS — M81.0 AGE-RELATED OSTEOPOROSIS WITHOUT CURRENT PATHOLOGICAL FRACTURE: ICD-10-CM

## 2022-05-23 DIAGNOSIS — E78.2 MIXED HYPERLIPIDEMIA: ICD-10-CM

## 2022-05-23 DIAGNOSIS — Z00.00 WELLNESS EXAMINATION: ICD-10-CM

## 2022-05-23 PROBLEM — G43.909 MIGRAINE HEADACHE: Status: ACTIVE | Noted: 2022-05-23

## 2022-05-23 PROBLEM — M85.80 OSTEOPENIA: Status: ACTIVE | Noted: 2022-05-23

## 2022-05-23 PROBLEM — H53.009 AMBLYOPIA: Status: ACTIVE | Noted: 2022-05-23

## 2022-05-23 PROBLEM — H25.13 AGE-RELATED NUCLEAR CATARACT OF BOTH EYES: Status: ACTIVE | Noted: 2022-05-23

## 2022-05-23 PROCEDURE — 99203 OFFICE O/P NEW LOW 30 MIN: CPT | Mod: ,,, | Performed by: STUDENT IN AN ORGANIZED HEALTH CARE EDUCATION/TRAINING PROGRAM

## 2022-05-23 PROCEDURE — 1101F PR PT FALLS ASSESS DOC 0-1 FALLS W/OUT INJ PAST YR: ICD-10-PCS | Mod: CPTII,,, | Performed by: STUDENT IN AN ORGANIZED HEALTH CARE EDUCATION/TRAINING PROGRAM

## 2022-05-23 PROCEDURE — 1160F RVW MEDS BY RX/DR IN RCRD: CPT | Mod: CPTII,,, | Performed by: STUDENT IN AN ORGANIZED HEALTH CARE EDUCATION/TRAINING PROGRAM

## 2022-05-23 PROCEDURE — 3008F BODY MASS INDEX DOCD: CPT | Mod: CPTII,,, | Performed by: STUDENT IN AN ORGANIZED HEALTH CARE EDUCATION/TRAINING PROGRAM

## 2022-05-23 PROCEDURE — 99203 PR OFFICE/OUTPT VISIT, NEW, LEVL III, 30-44 MIN: ICD-10-PCS | Mod: ,,, | Performed by: STUDENT IN AN ORGANIZED HEALTH CARE EDUCATION/TRAINING PROGRAM

## 2022-05-23 PROCEDURE — 3288F PR FALLS RISK ASSESSMENT DOCUMENTED: ICD-10-PCS | Mod: CPTII,,, | Performed by: STUDENT IN AN ORGANIZED HEALTH CARE EDUCATION/TRAINING PROGRAM

## 2022-05-23 PROCEDURE — 3008F PR BODY MASS INDEX (BMI) DOCUMENTED: ICD-10-PCS | Mod: CPTII,,, | Performed by: STUDENT IN AN ORGANIZED HEALTH CARE EDUCATION/TRAINING PROGRAM

## 2022-05-23 PROCEDURE — 1101F PT FALLS ASSESS-DOCD LE1/YR: CPT | Mod: CPTII,,, | Performed by: STUDENT IN AN ORGANIZED HEALTH CARE EDUCATION/TRAINING PROGRAM

## 2022-05-23 PROCEDURE — 3074F PR MOST RECENT SYSTOLIC BLOOD PRESSURE < 130 MM HG: ICD-10-PCS | Mod: CPTII,,, | Performed by: STUDENT IN AN ORGANIZED HEALTH CARE EDUCATION/TRAINING PROGRAM

## 2022-05-23 PROCEDURE — 1160F PR REVIEW ALL MEDS BY PRESCRIBER/CLIN PHARMACIST DOCUMENTED: ICD-10-PCS | Mod: CPTII,,, | Performed by: STUDENT IN AN ORGANIZED HEALTH CARE EDUCATION/TRAINING PROGRAM

## 2022-05-23 PROCEDURE — 3288F FALL RISK ASSESSMENT DOCD: CPT | Mod: CPTII,,, | Performed by: STUDENT IN AN ORGANIZED HEALTH CARE EDUCATION/TRAINING PROGRAM

## 2022-05-23 PROCEDURE — 1159F MED LIST DOCD IN RCRD: CPT | Mod: CPTII,,, | Performed by: STUDENT IN AN ORGANIZED HEALTH CARE EDUCATION/TRAINING PROGRAM

## 2022-05-23 PROCEDURE — 1126F PR PAIN SEVERITY QUANTIFIED, NO PAIN PRESENT: ICD-10-PCS | Mod: CPTII,,, | Performed by: STUDENT IN AN ORGANIZED HEALTH CARE EDUCATION/TRAINING PROGRAM

## 2022-05-23 PROCEDURE — 4010F ACE/ARB THERAPY RXD/TAKEN: CPT | Mod: CPTII,,, | Performed by: STUDENT IN AN ORGANIZED HEALTH CARE EDUCATION/TRAINING PROGRAM

## 2022-05-23 PROCEDURE — 4010F PR ACE/ARB THEARPY RXD/TAKEN: ICD-10-PCS | Mod: CPTII,,, | Performed by: STUDENT IN AN ORGANIZED HEALTH CARE EDUCATION/TRAINING PROGRAM

## 2022-05-23 PROCEDURE — 3078F DIAST BP <80 MM HG: CPT | Mod: CPTII,,, | Performed by: STUDENT IN AN ORGANIZED HEALTH CARE EDUCATION/TRAINING PROGRAM

## 2022-05-23 PROCEDURE — 3074F SYST BP LT 130 MM HG: CPT | Mod: CPTII,,, | Performed by: STUDENT IN AN ORGANIZED HEALTH CARE EDUCATION/TRAINING PROGRAM

## 2022-05-23 PROCEDURE — 1159F PR MEDICATION LIST DOCUMENTED IN MEDICAL RECORD: ICD-10-PCS | Mod: CPTII,,, | Performed by: STUDENT IN AN ORGANIZED HEALTH CARE EDUCATION/TRAINING PROGRAM

## 2022-05-23 PROCEDURE — 3078F PR MOST RECENT DIASTOLIC BLOOD PRESSURE < 80 MM HG: ICD-10-PCS | Mod: CPTII,,, | Performed by: STUDENT IN AN ORGANIZED HEALTH CARE EDUCATION/TRAINING PROGRAM

## 2022-05-23 PROCEDURE — 1126F AMNT PAIN NOTED NONE PRSNT: CPT | Mod: CPTII,,, | Performed by: STUDENT IN AN ORGANIZED HEALTH CARE EDUCATION/TRAINING PROGRAM

## 2022-05-23 RX ORDER — CYCLOSPORINE 0.5 MG/ML
1 EMULSION OPHTHALMIC 2 TIMES DAILY
COMMUNITY
Start: 2022-05-09

## 2022-05-23 RX ORDER — OMEPRAZOLE 40 MG/1
40 CAPSULE, DELAYED RELEASE ORAL DAILY
COMMUNITY
Start: 2022-05-16 | End: 2022-08-17 | Stop reason: SDUPTHER

## 2022-05-23 RX ORDER — EZETIMIBE 10 MG/1
10 TABLET ORAL DAILY
COMMUNITY
Start: 2022-03-30 | End: 2022-08-25 | Stop reason: SDUPTHER

## 2022-05-23 RX ORDER — LISINOPRIL 10 MG/1
10 TABLET ORAL DAILY
COMMUNITY
Start: 2022-05-03 | End: 2022-12-01 | Stop reason: SDUPTHER

## 2022-05-23 RX ORDER — VALACYCLOVIR HYDROCHLORIDE 1 G/1
TABLET, FILM COATED ORAL
COMMUNITY
Start: 2022-01-27 | End: 2022-06-15 | Stop reason: SDUPTHER

## 2022-05-23 RX ORDER — ASPIRIN 81 MG/1
81 TABLET ORAL DAILY
COMMUNITY
End: 2022-06-08 | Stop reason: SINTOL

## 2022-05-23 RX ORDER — ESZOPICLONE 3 MG/1
3 TABLET, FILM COATED ORAL NIGHTLY PRN
COMMUNITY
Start: 2022-05-17 | End: 2022-07-14 | Stop reason: SDUPTHER

## 2022-05-23 NOTE — ASSESSMENT & PLAN NOTE
Stable  Recommend lifestyle modifications (sit upright for >30 minutes after meals, do not eat <2 hours before bedtime)  Keep Food Diary, avoid triggers (spicy, alcohol, fatty, etc)  Continue Omeprazole 40mg daily

## 2022-05-23 NOTE — ASSESSMENT & PLAN NOTE
Last Lipid Panel in 1/2022 showed elevated total cholesterol at 214 and LDL elevated at 148.    Continue Zetia, currently taking it 3x a week, states that in the past when she tried to take it daily it caused severe fatigue, now tolerates it well. Recommended trying to increase the days of taking Zetia slowly again, goal to do daily now that it has been in her system for a while.  Counseled on dietary modifications  Counseled to exercise 150 minutes weekly as exercise raises HDL and lowers LDL

## 2022-05-23 NOTE — PROGRESS NOTES
"Subjective:       Patient ID: Sherry Heredia is a 76 y.o. female.    Past Medical History:  Allergy  Cataract      Comment:  mild cataract  Claustrophobia      Comment:  " close doors"  Constipation      Comment:  hx idiopatic constipation  Diverticulitis      Comment:  hx  Dry eyes  GERD (gastroesophageal reflux disease)  [place] (hard of hearing)  Hx of migraines  Hyperlipidemia  Hypertension  Left knee pain      Comment:  Dr Godwin  Osteopenia  Recurrent cold sores  Renal stones      Comment:  hx renal stones  Right hip pain      Comment:  Right hip replacement 2019  Wears glasses     Chief Complaint: Establish Care    HPI  Review of Systems   Constitutional: Negative for chills, fatigue, fever and unexpected weight change.   HENT: Negative for nasal congestion, ear pain, hearing loss, postnasal drip, rhinorrhea, sinus pressure/congestion, sneezing, sore throat and tinnitus.    Eyes: Negative for pain, redness and visual disturbance.   Respiratory: Negative for cough, shortness of breath and wheezing.    Cardiovascular: Negative for chest pain, palpitations and leg swelling.   Gastrointestinal: Negative for abdominal pain, blood in stool, constipation, diarrhea, nausea, vomiting and reflux.   Endocrine: Negative for cold intolerance, heat intolerance and polyuria.   Genitourinary: Negative for dysuria, frequency, hematuria and urgency.   Integumentary:  Negative for color change, rash and wound.   Neurological: Negative for dizziness, syncope, weakness, light-headedness, numbness and headaches.   Hematological: Does not bruise/bleed easily.   Psychiatric/Behavioral: Negative for sleep disturbance. The patient is not nervous/anxious.          Objective:      Physical Exam  Vitals and nursing note reviewed.   Constitutional:       General: She is not in acute distress.     Appearance: She is not ill-appearing.   HENT:      Nose: No congestion or rhinorrhea.      Mouth/Throat:      Mouth: Mucous membranes are moist.      " Pharynx: No oropharyngeal exudate or posterior oropharyngeal erythema.   Eyes:      General: No scleral icterus.     Extraocular Movements: Extraocular movements intact.      Conjunctiva/sclera: Conjunctivae normal.      Pupils: Pupils are equal, round, and reactive to light.   Cardiovascular:      Rate and Rhythm: Normal rate and regular rhythm.      Pulses: Normal pulses.      Heart sounds: No murmur heard.  Pulmonary:      Effort: Pulmonary effort is normal. No respiratory distress.      Breath sounds: Normal breath sounds. No wheezing or rhonchi.   Abdominal:      Palpations: Abdomen is soft. There is no mass.      Tenderness: There is no abdominal tenderness.   Musculoskeletal:         General: No swelling, tenderness or signs of injury.      Cervical back: Neck supple.      Right lower leg: No edema.      Left lower leg: No edema.   Lymphadenopathy:      Cervical: No cervical adenopathy.   Skin:     General: Skin is warm.      Coloration: Skin is not jaundiced.      Findings: No erythema or rash.   Neurological:      General: No focal deficit present.      Mental Status: She is alert. Mental status is at baseline.      Gait: Gait normal.   Psychiatric:         Mood and Affect: Mood normal.         Behavior: Behavior normal.         Assessment & Plan:   1. Encounter to establish care with new doctor    2. Primary hypertension  Assessment & Plan:  Today's BP WNL  Continue Lisinopril   Counseled on low sodium diet, exercise, tobacco avoidance, and limiting alcohol intake   Pt. Has home BP cuff, instructed to measure home BP and report abnormal values           3. Mixed hyperlipidemia  Overview:  ASCVD Score = 19.8%     Assessment & Plan:  Last Lipid Panel in 1/2022 showed elevated total cholesterol at 214 and LDL elevated at 148.    Continue Zetia 10mg Daily. Counseled on dietary modifications  Counseled to exercise 150 minutes weekly as exercise raises HDL and lowers LDL         4. Gastroesophageal reflux  disease without esophagitis    5. Age-related osteoporosis without current pathological fracture  Overview:  Tried Prolia/Bisphosphonates in the past  Declined further Screening - last DEXA in 2018     Assessment & Plan:  Continue Daily Calcium and Vitamin D supplementation       6. Hx of cold sores    7. Chronic idiopathic constipation    8. Primary insomnia    9. Encounter for screening mammogram for breast cancer  -     Mammo Digital Screening Bilat    RTC in 7 months for Wellness Visit, labs prior

## 2022-05-23 NOTE — PROGRESS NOTES
Subjective:       Patient ID: Sherry Heredia is a 76 y.o. female.    Past Medical History:  Seasonal Allergies  Cataract  Claustrophobia  Chronic Idiopathic Constipation  History of Diverticulitis  Dry eyes  GERD (gastroesophageal reflux disease)  [place] (hard of hearing)  Hx of migraines  Hyperlipidemia  Hypertension  Left knee pain  Osteoporosis  Recurrent cold sores  History renal stones  Right hip pain      Comment:  Right hip replacement 2019  Wears glasses     Chief Complaint: Establish Care    Patient presents to the clinic to establish care. Last wellness visit was in 1/2022. Patient's previous provider moved out of this area. Has no acute complaints at this time. Doesn't need medication refills now, but will need it soon. Will call the office when she needs them. States that she is due for a mammogram in July. Needs an order, gets them done at [facility]. States that she is in the process of looking for a new eye doctor as well, going to try to set up appointment by herself, but if she needs a referral will just call us and let us know. Endorsed compliance with her medication.     Review of Systems   Constitutional: Negative for chills, fatigue, fever and unexpected weight change.   HENT: Negative for nasal congestion, ear pain, hearing loss, postnasal drip, rhinorrhea, sinus pressure/congestion, sneezing, sore throat and tinnitus.    Eyes: Negative for pain, redness and visual disturbance.   Respiratory: Negative for cough, shortness of breath and wheezing.    Cardiovascular: Negative for chest pain, palpitations and leg swelling.   Gastrointestinal: Negative for abdominal pain, blood in stool, constipation, diarrhea, nausea, vomiting and reflux.   Endocrine: Negative for cold intolerance, heat intolerance and polyuria.   Genitourinary: Negative for dysuria, frequency, hematuria and urgency.   Integumentary:  Negative for color change, rash and wound.   Neurological: Negative for dizziness,  syncope, weakness, light-headedness, numbness and headaches.   Hematological: Does not bruise/bleed easily.   Psychiatric/Behavioral: Negative for sleep disturbance. The patient is not nervous/anxious.          Objective:      Physical Exam  Vitals and nursing note reviewed.   Constitutional:       General: She is not in acute distress.     Appearance: She is not ill-appearing.   HENT:      Nose: No congestion or rhinorrhea.      Mouth/Throat:      Mouth: Mucous membranes are moist.      Pharynx: No oropharyngeal exudate or posterior oropharyngeal erythema.   Eyes:      General: No scleral icterus.     Extraocular Movements: Extraocular movements intact.      Conjunctiva/sclera: Conjunctivae normal.      Pupils: Pupils are equal, round, and reactive to light.   Cardiovascular:      Rate and Rhythm: Normal rate and regular rhythm.      Pulses: Normal pulses.      Heart sounds: No murmur heard.  Pulmonary:      Effort: Pulmonary effort is normal. No respiratory distress.      Breath sounds: Normal breath sounds. No wheezing or rhonchi.   Abdominal:      Palpations: Abdomen is soft. There is no mass.      Tenderness: There is no abdominal tenderness.   Musculoskeletal:         General: No swelling, tenderness or signs of injury.      Cervical back: Neck supple.      Right lower leg: No edema.      Left lower leg: No edema.   Lymphadenopathy:      Cervical: No cervical adenopathy.   Skin:     General: Skin is warm.      Coloration: Skin is not jaundiced.      Findings: No erythema or rash.   Neurological:      General: No focal deficit present.      Mental Status: She is alert. Mental status is at baseline.      Gait: Gait normal.   Psychiatric:         Mood and Affect: Mood normal.         Behavior: Behavior normal.         Assessment & Plan:   1. Encounter to establish care with new doctor  Reviewed medical history and reconciled medications   Ordered Wellness Labs   Requested/Reviewed records from previous  providers/specialists    2. Primary hypertension  Assessment & Plan:  Today's BP WNL  Continue Lisinopril   Counseled on low sodium diet, exercise, tobacco avoidance, and limiting alcohol intake   Pt. Has home BP cuff, instructed to measure home BP and report abnormal values     3. Mixed hyperlipidemia  Overview:  ASCVD Score = 19.8%   Unable to tolerate Statin Therapy - severe muscle aches   Assessment & Plan:  Last Lipid Panel in 1/2022 showed elevated total cholesterol at 214 and LDL elevated at 148.    Continue Zetia, currently taking it 3x a week, states that in the past when she tried to take it daily it caused severe fatigue, now tolerates it well. Recommended trying to increase the days of taking Zetia slowly again, goal to do daily now that it has been in her system for a while.  Counseled on dietary modifications  Counseled to exercise 150 minutes weekly as exercise raises HDL and lowers LDL     4. Gastroesophageal reflux disease without esophagitis  Assessment & Plan:  Stable  Recommend lifestyle modifications (sit upright for >30 minutes after meals, do not eat <2 hours before bedtime)  Keep Food Diary, avoid triggers (spicy, alcohol, fatty, etc)  Continue Omeprazole 40mg daily     5. Age-related osteoporosis without current pathological fracture  Overview:  Tried Prolia/Bisphosphonates in the past  Declined further Screening - last DEXA in 2018   Assessment & Plan:  Continue Daily Calcium and Vitamin D supplementation     6. Hx of cold sores  Assessment & Plan:  Stable  Take Valtrex at first sign of break out.    7. Chronic idiopathic constipation  Assessment & Plan:  Stable, continue OTC therapy  Encouraged increased PO hydration/fiber intake     8. Primary insomnia  Assessment & Plan:  Stable  Continue Lunesta 3mg as needed for insomnia  Encouraged good sleep hygiene    9. Encounter for screening mammogram for breast cancer  -  Ordered screening mammogram, fax to breast center [facility] per  patient's request    10. Wellness examination  - Ordered labs for wellness visit     RTC in 7 months for Wellness Visit, labs prior

## 2022-05-23 NOTE — ASSESSMENT & PLAN NOTE
Today's BP WNL  Continue Lisinopril   Counseled on low sodium diet, exercise, tobacco avoidance, and limiting alcohol intake   Pt. Has home BP cuff, instructed to measure home BP and report abnormal values

## 2022-06-07 ENCOUNTER — OFFICE VISIT (OUTPATIENT)
Dept: PRIMARY CARE CLINIC | Facility: CLINIC | Age: 77
End: 2022-06-07
Payer: COMMERCIAL

## 2022-06-07 VITALS
RESPIRATION RATE: 18 BRPM | TEMPERATURE: 98 F | SYSTOLIC BLOOD PRESSURE: 120 MMHG | OXYGEN SATURATION: 99 % | DIASTOLIC BLOOD PRESSURE: 76 MMHG | BODY MASS INDEX: 20.06 KG/M2 | HEART RATE: 90 BPM | HEIGHT: 62 IN | WEIGHT: 109 LBS

## 2022-06-07 DIAGNOSIS — S40.021A CONTUSION OF BOTH UPPER EXTREMITIES, INITIAL ENCOUNTER: ICD-10-CM

## 2022-06-07 DIAGNOSIS — R25.2 CRAMPS, MUSCLE, GENERAL: Primary | ICD-10-CM

## 2022-06-07 DIAGNOSIS — S40.022A CONTUSION OF BOTH UPPER EXTREMITIES, INITIAL ENCOUNTER: ICD-10-CM

## 2022-06-07 PROCEDURE — 4010F ACE/ARB THERAPY RXD/TAKEN: CPT | Mod: CPTII,,, | Performed by: STUDENT IN AN ORGANIZED HEALTH CARE EDUCATION/TRAINING PROGRAM

## 2022-06-07 PROCEDURE — 3078F PR MOST RECENT DIASTOLIC BLOOD PRESSURE < 80 MM HG: ICD-10-PCS | Mod: CPTII,,, | Performed by: STUDENT IN AN ORGANIZED HEALTH CARE EDUCATION/TRAINING PROGRAM

## 2022-06-07 PROCEDURE — 1126F PR PAIN SEVERITY QUANTIFIED, NO PAIN PRESENT: ICD-10-PCS | Mod: CPTII,,, | Performed by: STUDENT IN AN ORGANIZED HEALTH CARE EDUCATION/TRAINING PROGRAM

## 2022-06-07 PROCEDURE — 3008F PR BODY MASS INDEX (BMI) DOCUMENTED: ICD-10-PCS | Mod: CPTII,,, | Performed by: STUDENT IN AN ORGANIZED HEALTH CARE EDUCATION/TRAINING PROGRAM

## 2022-06-07 PROCEDURE — 3078F DIAST BP <80 MM HG: CPT | Mod: CPTII,,, | Performed by: STUDENT IN AN ORGANIZED HEALTH CARE EDUCATION/TRAINING PROGRAM

## 2022-06-07 PROCEDURE — 1126F AMNT PAIN NOTED NONE PRSNT: CPT | Mod: CPTII,,, | Performed by: STUDENT IN AN ORGANIZED HEALTH CARE EDUCATION/TRAINING PROGRAM

## 2022-06-07 PROCEDURE — 99213 PR OFFICE/OUTPT VISIT, EST, LEVL III, 20-29 MIN: ICD-10-PCS | Mod: ,,, | Performed by: STUDENT IN AN ORGANIZED HEALTH CARE EDUCATION/TRAINING PROGRAM

## 2022-06-07 PROCEDURE — 1160F PR REVIEW ALL MEDS BY PRESCRIBER/CLIN PHARMACIST DOCUMENTED: ICD-10-PCS | Mod: CPTII,,, | Performed by: STUDENT IN AN ORGANIZED HEALTH CARE EDUCATION/TRAINING PROGRAM

## 2022-06-07 PROCEDURE — 3074F PR MOST RECENT SYSTOLIC BLOOD PRESSURE < 130 MM HG: ICD-10-PCS | Mod: CPTII,,, | Performed by: STUDENT IN AN ORGANIZED HEALTH CARE EDUCATION/TRAINING PROGRAM

## 2022-06-07 PROCEDURE — 1159F PR MEDICATION LIST DOCUMENTED IN MEDICAL RECORD: ICD-10-PCS | Mod: CPTII,,, | Performed by: STUDENT IN AN ORGANIZED HEALTH CARE EDUCATION/TRAINING PROGRAM

## 2022-06-07 PROCEDURE — 4010F PR ACE/ARB THEARPY RXD/TAKEN: ICD-10-PCS | Mod: CPTII,,, | Performed by: STUDENT IN AN ORGANIZED HEALTH CARE EDUCATION/TRAINING PROGRAM

## 2022-06-07 PROCEDURE — 1160F RVW MEDS BY RX/DR IN RCRD: CPT | Mod: CPTII,,, | Performed by: STUDENT IN AN ORGANIZED HEALTH CARE EDUCATION/TRAINING PROGRAM

## 2022-06-07 PROCEDURE — 3008F BODY MASS INDEX DOCD: CPT | Mod: CPTII,,, | Performed by: STUDENT IN AN ORGANIZED HEALTH CARE EDUCATION/TRAINING PROGRAM

## 2022-06-07 PROCEDURE — 99213 OFFICE O/P EST LOW 20 MIN: CPT | Mod: ,,, | Performed by: STUDENT IN AN ORGANIZED HEALTH CARE EDUCATION/TRAINING PROGRAM

## 2022-06-07 PROCEDURE — 3074F SYST BP LT 130 MM HG: CPT | Mod: CPTII,,, | Performed by: STUDENT IN AN ORGANIZED HEALTH CARE EDUCATION/TRAINING PROGRAM

## 2022-06-07 PROCEDURE — 1159F MED LIST DOCD IN RCRD: CPT | Mod: CPTII,,, | Performed by: STUDENT IN AN ORGANIZED HEALTH CARE EDUCATION/TRAINING PROGRAM

## 2022-06-07 NOTE — PROGRESS NOTES
"Subjective:       Patient ID: Sherry Heredia is a 76 y.o. female.    Past Medical History:  Age-related nuclear cataract of both eyes  Chronic idiopathic constipation  Claustrophobia      Comment:  " close doors"  Diverticulitis  Dry eyes  Gastroesophageal reflux disease without esophagitis  [place])  Left knee pain  Migraine headache  Mixed hyperlipidemia      Comment:  ASCVD Score = 19.8%   Primary hypertension  Renal stones  Right hip pain      Comment:  Right hip replacement 2019  Wears glasses     Chief Complaint: bruising  on upper extermities (X 1 month), Hand Pain, and Foot Pain (Reports hand and feet pain cramping 1 + month)    Presents to the clinic for same day appointment. Has noticed worsening bruising on both of her arms. Concerned that it may be more than just old age. Admits to spending a lot of time outdoors gardening/working in the yard. Denies any trauma. Takes ASA weekly for prevention, no known blockages. Never had stents placed. Also endorsed hand/feet cramping (carmelo horses) for the last month. Worsening, and hurts. Lasts a few minutes then self resolves. Tries to drink a lot of water and Gatorade to stay well hydrated. No medication changes.     Review of Systems   Constitutional: Negative for chills, fatigue, fever and unexpected weight change.   HENT: Negative for ear pain, rhinorrhea, sinus pressure/congestion and sore throat.    Eyes: Positive for discharge. Negative for pain.   Respiratory: Negative for cough, shortness of breath and wheezing.    Cardiovascular: Negative for chest pain, palpitations and leg swelling.   Gastrointestinal: Negative for constipation, diarrhea, nausea, vomiting and reflux.   Genitourinary: Negative for dysuria, frequency and hematuria.   Musculoskeletal: Positive for leg pain and myalgias. Negative for back pain and gait problem.   Integumentary:  Negative for rash and wound.   Neurological: Negative for weakness, light-headedness, numbness and " headaches.   Hematological: Bruises/bleeds easily.   Psychiatric/Behavioral: Negative for decreased concentration and sleep disturbance. The patient is not nervous/anxious.          Objective:      Physical Exam  Constitutional:       General: She is not in acute distress.     Appearance: She is not ill-appearing.   HENT:      Head: Normocephalic.      Right Ear: External ear normal.      Left Ear: External ear normal.      Nose: Nose normal.      Mouth/Throat:      Mouth: Mucous membranes are moist.   Eyes:      General:         Right eye: Discharge present.      Extraocular Movements: Extraocular movements intact.      Conjunctiva/sclera: Conjunctivae normal.      Pupils: Pupils are equal, round, and reactive to light.   Cardiovascular:      Rate and Rhythm: Normal rate and regular rhythm.      Heart sounds: Normal heart sounds. No murmur heard.  Pulmonary:      Effort: Pulmonary effort is normal. No respiratory distress.      Breath sounds: Normal breath sounds. No wheezing.   Abdominal:      General: There is no distension.      Palpations: Abdomen is soft.      Tenderness: There is no abdominal tenderness.   Musculoskeletal:         General: No swelling or tenderness.      Cervical back: Normal range of motion and neck supple.      Right lower leg: No edema.      Left lower leg: No edema.   Skin:     General: Skin is warm.      Findings: Bruising present. No erythema or rash.   Neurological:      General: No focal deficit present.      Mental Status: She is alert and oriented to person, place, and time.      Gait: Gait normal.   Psychiatric:         Mood and Affect: Mood normal.         Behavior: Behavior normal.         Assessment & Plan:   1. Cramps, muscle, general  Comments:  Encouraged PO hydration/replenish electrolytes, avoid outside in hottest part of the day.   Ordered BMP, Mg to r/o electrolyte abnormalities.     2. Contusion of both upper extremities, initial encounter  Comments:  Suspect age related  bruising  Discontinue ASA  Ordered CBC, Coag studies     RTC in 3 months for follow up

## 2022-06-13 ENCOUNTER — DOCUMENTATION ONLY (OUTPATIENT)
Dept: ADMINISTRATIVE | Facility: HOSPITAL | Age: 77
End: 2022-06-13
Payer: MEDICARE

## 2022-06-15 RX ORDER — VALACYCLOVIR HYDROCHLORIDE 1 G/1
TABLET, FILM COATED ORAL
Qty: 20 TABLET | Refills: 3 | Status: SHIPPED | OUTPATIENT
Start: 2022-06-15

## 2022-06-20 ENCOUNTER — LAB VISIT (OUTPATIENT)
Dept: LAB | Facility: HOSPITAL | Age: 77
End: 2022-06-20
Attending: STUDENT IN AN ORGANIZED HEALTH CARE EDUCATION/TRAINING PROGRAM
Payer: MEDICARE

## 2022-06-20 ENCOUNTER — TELEPHONE (OUTPATIENT)
Dept: PRIMARY CARE CLINIC | Facility: CLINIC | Age: 77
End: 2022-06-20
Payer: MEDICARE

## 2022-06-20 DIAGNOSIS — S40.021A CONTUSION OF BOTH UPPER EXTREMITIES, INITIAL ENCOUNTER: ICD-10-CM

## 2022-06-20 DIAGNOSIS — S40.022A CONTUSION OF BOTH UPPER EXTREMITIES, INITIAL ENCOUNTER: ICD-10-CM

## 2022-06-20 DIAGNOSIS — R25.2 CRAMPS, MUSCLE, GENERAL: ICD-10-CM

## 2022-06-20 LAB
ANION GAP SERPL CALC-SCNC: 5 MEQ/L
APPEARANCE UR: CLEAR
APTT PPP: 28.7 SECONDS (ref 23.2–33.7)
BACTERIA #/AREA URNS AUTO: NORMAL /HPF
BASOPHILS # BLD AUTO: 0.07 X10(3)/MCL (ref 0–0.2)
BASOPHILS NFR BLD AUTO: 1 %
BILIRUB UR QL STRIP.AUTO: NEGATIVE MG/DL
BUN SERPL-MCNC: 22.1 MG/DL (ref 9.8–20.1)
CALCIUM SERPL-MCNC: 9.8 MG/DL (ref 8.4–10.2)
CHLORIDE SERPL-SCNC: 99 MMOL/L (ref 98–107)
CO2 SERPL-SCNC: 30 MMOL/L (ref 23–31)
COLOR UR AUTO: YELLOW
CREAT SERPL-MCNC: 0.85 MG/DL (ref 0.55–1.02)
CREAT/UREA NIT SERPL: 26
EOSINOPHIL # BLD AUTO: 0.21 X10(3)/MCL (ref 0–0.9)
EOSINOPHIL NFR BLD AUTO: 3.1 %
ERYTHROCYTE [DISTWIDTH] IN BLOOD BY AUTOMATED COUNT: 13.2 % (ref 11.5–17)
GLUCOSE SERPL-MCNC: 89 MG/DL (ref 82–115)
GLUCOSE UR QL STRIP.AUTO: NEGATIVE MG/DL
HCT VFR BLD AUTO: 40.5 % (ref 37–47)
HGB BLD-MCNC: 13 GM/DL (ref 12–16)
IMM GRANULOCYTES # BLD AUTO: 0.01 X10(3)/MCL (ref 0–0.02)
IMM GRANULOCYTES NFR BLD AUTO: 0.1 % (ref 0–0.43)
INR BLD: 0.95 (ref 0–1.3)
KETONES UR QL STRIP.AUTO: NEGATIVE MG/DL
LEUKOCYTE ESTERASE UR QL STRIP.AUTO: NEGATIVE UNIT/L
LYMPHOCYTES # BLD AUTO: 1.74 X10(3)/MCL (ref 0.6–4.6)
LYMPHOCYTES NFR BLD AUTO: 25.7 %
MAGNESIUM SERPL-MCNC: 2.2 MG/DL (ref 1.6–2.6)
MCH RBC QN AUTO: 29.9 PG (ref 27–31)
MCHC RBC AUTO-ENTMCNC: 32.1 MG/DL (ref 33–36)
MCV RBC AUTO: 93.1 FL (ref 80–94)
MONOCYTES # BLD AUTO: 0.5 X10(3)/MCL (ref 0.1–1.3)
MONOCYTES NFR BLD AUTO: 7.4 %
NEUTROPHILS # BLD AUTO: 4.3 X10(3)/MCL (ref 2.1–9.2)
NEUTROPHILS NFR BLD AUTO: 62.7 %
NITRITE UR QL STRIP.AUTO: NEGATIVE
NRBC BLD AUTO-RTO: 0 %
PH UR STRIP.AUTO: 6.5 [PH]
PLATELET # BLD AUTO: 356 X10(3)/MCL (ref 130–400)
PMV BLD AUTO: 9.3 FL (ref 9.4–12.4)
POTASSIUM SERPL-SCNC: 4.9 MMOL/L (ref 3.5–5.1)
PROT UR QL STRIP.AUTO: NEGATIVE MG/DL
PROTHROMBIN TIME: 12.6 SECONDS (ref 12.5–14.5)
RBC # BLD AUTO: 4.35 X10(6)/MCL (ref 4.2–5.4)
RBC #/AREA URNS AUTO: <5 /HPF
RBC UR QL AUTO: NEGATIVE UNIT/L
SODIUM SERPL-SCNC: 134 MMOL/L (ref 136–145)
SP GR UR STRIP.AUTO: 1.02 (ref 1–1.03)
SQUAMOUS #/AREA URNS AUTO: <4 /HPF
UROBILINOGEN UR STRIP-ACNC: 1 MG/DL
WBC # SPEC AUTO: 6.8 X10(3)/MCL (ref 4.5–11.5)
WBC #/AREA URNS AUTO: <5 /HPF

## 2022-06-20 PROCEDURE — 83735 ASSAY OF MAGNESIUM: CPT

## 2022-06-20 PROCEDURE — 85730 THROMBOPLASTIN TIME PARTIAL: CPT

## 2022-06-20 PROCEDURE — 80048 BASIC METABOLIC PNL TOTAL CA: CPT

## 2022-06-20 PROCEDURE — 36415 COLL VENOUS BLD VENIPUNCTURE: CPT

## 2022-06-20 PROCEDURE — 85610 PROTHROMBIN TIME: CPT

## 2022-06-20 PROCEDURE — 85025 COMPLETE CBC W/AUTO DIFF WBC: CPT

## 2022-06-20 NOTE — TELEPHONE ENCOUNTER
----- Message from Homa Dey MD sent at 6/20/2022  3:54 PM CDT -----  Please let the patient know:    1. Coagulation studies were normal.   2. Mildly low sodium level and elevated BUN - dehydrated, likely contributing to the muscle cramps. Recommend increase PO hydration (water, electrolytes as well - Gatorade, liquid IV, etc). Drink at least >40 ounces per day. Normal kidney function. Will monitor. Normal Magnesium Level.   3. CBC unremarkable.     Please let me know if she has any further questions.     Thanks,   Homa Dey MD

## 2022-06-20 NOTE — TELEPHONE ENCOUNTER
----- Message from Homa Dey MD sent at 6/20/2022  3:48 PM CDT -----  Please let the patient know that her UA was normal. No further workup required.     Thanks,   Homa Dey MD

## 2022-06-20 NOTE — TELEPHONE ENCOUNTER
sPOKE TO PATIENT WHO ADDED  TO CALL BECAUSE OF HER HEARING ISSUE.   tHEY BOTH VERBALIZED UNDERSTANDING.

## 2022-07-11 ENCOUNTER — TELEPHONE (OUTPATIENT)
Dept: PRIMARY CARE CLINIC | Facility: CLINIC | Age: 77
End: 2022-07-11
Payer: MEDICARE

## 2022-07-14 DIAGNOSIS — G47.00 INSOMNIA, UNSPECIFIED TYPE: Primary | ICD-10-CM

## 2022-07-14 RX ORDER — ESZOPICLONE 3 MG/1
3 TABLET, FILM COATED ORAL NIGHTLY PRN
Qty: 30 TABLET | Refills: 3 | Status: SHIPPED | OUTPATIENT
Start: 2022-07-14 | End: 2022-11-14 | Stop reason: SDUPTHER

## 2022-07-14 NOTE — TELEPHONE ENCOUNTER
Please let the patient know that her mammogram from breast center [facility] was negative for malignancy at this time.     Thanks,   Homa Dey MD

## 2022-07-14 NOTE — TELEPHONE ENCOUNTER
Pt advised of MMG results, she is also requesting a refill on Lunesta to be sent to [facility].

## 2022-08-17 ENCOUNTER — DOCUMENTATION ONLY (OUTPATIENT)
Dept: ADMINISTRATIVE | Facility: HOSPITAL | Age: 77
End: 2022-08-17
Payer: MEDICARE

## 2022-08-17 DIAGNOSIS — K21.9 GASTROESOPHAGEAL REFLUX DISEASE WITHOUT ESOPHAGITIS: Primary | ICD-10-CM

## 2022-08-17 RX ORDER — OMEPRAZOLE 40 MG/1
40 CAPSULE, DELAYED RELEASE ORAL DAILY
Qty: 30 CAPSULE | Refills: 1 | Status: SHIPPED | OUTPATIENT
Start: 2022-08-17 | End: 2022-10-19 | Stop reason: SDUPTHER

## 2022-08-25 DIAGNOSIS — E78.2 MIXED HYPERLIPIDEMIA: Primary | ICD-10-CM

## 2022-08-25 RX ORDER — EZETIMIBE 10 MG/1
10 TABLET ORAL DAILY
Qty: 30 TABLET | Refills: 1 | Status: SHIPPED | OUTPATIENT
Start: 2022-08-25 | End: 2023-01-30 | Stop reason: SDUPTHER

## 2022-10-19 ENCOUNTER — TELEPHONE (OUTPATIENT)
Dept: PRIMARY CARE CLINIC | Facility: CLINIC | Age: 77
End: 2022-10-19
Payer: MEDICARE

## 2022-10-19 DIAGNOSIS — K21.9 GASTROESOPHAGEAL REFLUX DISEASE WITHOUT ESOPHAGITIS: ICD-10-CM

## 2022-10-19 RX ORDER — OMEPRAZOLE 40 MG/1
40 CAPSULE, DELAYED RELEASE ORAL DAILY
Qty: 90 CAPSULE | Refills: 3 | Status: SHIPPED | OUTPATIENT
Start: 2022-10-19 | End: 2023-01-30 | Stop reason: SDUPTHER

## 2022-10-19 NOTE — TELEPHONE ENCOUNTER
----- Message from Princess Gregory sent at 10/19/2022  9:29 AM CDT -----  Regarding: REFILL  omeprazole (PRILOSEC) 40 MG capsule      PHARMACY- [facility]    CALL BACK #- [phone](-PT )

## 2022-11-14 DIAGNOSIS — G47.00 INSOMNIA, UNSPECIFIED TYPE: ICD-10-CM

## 2022-11-14 RX ORDER — ESZOPICLONE 3 MG/1
3 TABLET, FILM COATED ORAL NIGHTLY PRN
Qty: 30 TABLET | Refills: 3 | Status: CANCELLED | OUTPATIENT
Start: 2022-11-14

## 2022-11-14 RX ORDER — ESZOPICLONE 3 MG/1
3 TABLET, FILM COATED ORAL NIGHTLY PRN
Qty: 30 TABLET | Refills: 3 | Status: SHIPPED | OUTPATIENT
Start: 2022-11-14 | End: 2023-03-20 | Stop reason: SDUPTHER

## 2022-12-01 DIAGNOSIS — I10 PRIMARY HYPERTENSION: Primary | ICD-10-CM

## 2022-12-01 RX ORDER — LISINOPRIL 10 MG/1
10 TABLET ORAL DAILY
Qty: 30 TABLET | Refills: 3 | Status: SHIPPED | OUTPATIENT
Start: 2022-12-01 | End: 2023-01-30 | Stop reason: SDUPTHER

## 2023-01-03 ENCOUNTER — TELEPHONE (OUTPATIENT)
Dept: PRIMARY CARE CLINIC | Facility: CLINIC | Age: 78
End: 2023-01-03
Payer: MEDICARE

## 2023-01-03 DIAGNOSIS — R10.2 VAGINAL PAIN: Primary | ICD-10-CM

## 2023-01-03 NOTE — TELEPHONE ENCOUNTER
----- Message from Hitesh Mobley MA sent at 1/3/2023  4:42 PM CST -----  Regarding: FW: advice  Spoke to patient would like referral submitted prior to her wellness at the end of the month.   Patient reports she has not had a pap smear in about 20+ years.  ----- Message -----  From: Sun Gaspar  Sent: 1/3/2023   1:36 PM CST  To: [facility] Staff  Subject: advice                                           Type:  Needs Medical Advice    Who Called: pt  Symptoms (please be specific): vaginal pain and pain lower  left side, sharp pain especially when she walks  How long has patient had these symptoms:  several weeks  Pharmacy name and phone #:    Would the patient rather a call back or a response via [facility]? C/b  Best Call Back Number: [phone]  Additional Information: pt doesn't like leaving the home and is uncomfortable in discussing pain.  Wants to know if pt needs a referral to a gynecologist  or wait until appt the end of january

## 2023-01-03 NOTE — TELEPHONE ENCOUNTER
Sent referral to Dr. Esposito (OBGYN). Please let the patient know to be on the look out for the call.     Also sent in Premarin vaginal cream (non-systemic estrogen therapy). Most common cause for vaginal pain in post-menopausal women is vaginal atrophy leading to dryness and irritation. Hopefully this will provide some relief while waiting for the referral.     Thanks,  Homa Dey MD

## 2023-01-04 DIAGNOSIS — R52 PAIN: ICD-10-CM

## 2023-01-04 DIAGNOSIS — N89.8 DRYNESS OF VAGINA: ICD-10-CM

## 2023-01-04 DIAGNOSIS — R10.2 VAGINAL PAIN: Primary | ICD-10-CM

## 2023-01-06 ENCOUNTER — TELEPHONE (OUTPATIENT)
Dept: PRIMARY CARE CLINIC | Facility: CLINIC | Age: 78
End: 2023-01-06
Payer: MEDICARE

## 2023-01-06 NOTE — TELEPHONE ENCOUNTER
----- Message from Sun Gaspar sent at 1/6/2023  8:38 AM CST -----  Regarding: referral  Type:  Patient Requesting Referral    Who Called:pt  Does the patient already have the specialty appointment scheduled?:  If yes, what is the date of that appointment?:  Referral to What Specialty:gynecology  Reason for Referral:   Does the patient want the referral with a specific physician?:Dr Anisha Cox  Is the specialist an [facility]?:  Patient Requesting a Response?:yes  Would the patient rather a call back or a response via [facility]? C/b  Best Call Back Number:[phone]  Additional Information: pt would like referral sent to this Dr ACACIA Cox on [facility] for gyno exam

## 2023-01-10 ENCOUNTER — OFFICE VISIT (OUTPATIENT)
Dept: PRIMARY CARE CLINIC | Facility: CLINIC | Age: 78
End: 2023-01-10
Payer: MEDICARE

## 2023-01-10 DIAGNOSIS — Z87.42 HISTORY OF OVARIAN CYST: ICD-10-CM

## 2023-01-10 DIAGNOSIS — M81.0 AGE-RELATED OSTEOPOROSIS WITHOUT CURRENT PATHOLOGICAL FRACTURE: ICD-10-CM

## 2023-01-10 DIAGNOSIS — Z87.442 HISTORY OF KIDNEY STONES: ICD-10-CM

## 2023-01-10 DIAGNOSIS — R10.32 LEFT GROIN PAIN: Primary | ICD-10-CM

## 2023-01-10 PROCEDURE — 1159F PR MEDICATION LIST DOCUMENTED IN MEDICAL RECORD: ICD-10-PCS | Mod: CPTII,95,, | Performed by: STUDENT IN AN ORGANIZED HEALTH CARE EDUCATION/TRAINING PROGRAM

## 2023-01-10 PROCEDURE — 99214 PR OFFICE/OUTPT VISIT, EST, LEVL IV, 30-39 MIN: ICD-10-PCS | Mod: 95,,, | Performed by: STUDENT IN AN ORGANIZED HEALTH CARE EDUCATION/TRAINING PROGRAM

## 2023-01-10 PROCEDURE — 1160F RVW MEDS BY RX/DR IN RCRD: CPT | Mod: CPTII,95,, | Performed by: STUDENT IN AN ORGANIZED HEALTH CARE EDUCATION/TRAINING PROGRAM

## 2023-01-10 PROCEDURE — 1159F MED LIST DOCD IN RCRD: CPT | Mod: CPTII,95,, | Performed by: STUDENT IN AN ORGANIZED HEALTH CARE EDUCATION/TRAINING PROGRAM

## 2023-01-10 PROCEDURE — 1160F PR REVIEW ALL MEDS BY PRESCRIBER/CLIN PHARMACIST DOCUMENTED: ICD-10-PCS | Mod: CPTII,95,, | Performed by: STUDENT IN AN ORGANIZED HEALTH CARE EDUCATION/TRAINING PROGRAM

## 2023-01-10 PROCEDURE — 99214 OFFICE O/P EST MOD 30 MIN: CPT | Mod: 95,,, | Performed by: STUDENT IN AN ORGANIZED HEALTH CARE EDUCATION/TRAINING PROGRAM

## 2023-01-10 RX ORDER — MELOXICAM 15 MG/1
15 TABLET ORAL DAILY
Qty: 20 TABLET | Refills: 1 | Status: SHIPPED | OUTPATIENT
Start: 2023-01-10 | End: 2023-01-30 | Stop reason: ALTCHOICE

## 2023-01-10 RX ORDER — ERYTHROMYCIN 5 MG/G
OINTMENT OPHTHALMIC
COMMUNITY
Start: 2022-08-01

## 2023-01-10 RX ORDER — MOXIFLOXACIN 5 MG/ML
1 SOLUTION/ DROPS OPHTHALMIC 4 TIMES DAILY
COMMUNITY
Start: 2022-08-17

## 2023-01-10 NOTE — PROGRESS NOTES
Established Patient - Audio Only Telehealth Visit    Patient ID: Sherry Heredia is a 77 y.o. female    The patient location is: [place]   The chief complaint leading to consultation is:Left Groin Pain  Visit type: Virtual visit w/ Audio Only   Total time spent with patient: >20 minutes (Greater than 50% of that time was used for education and counseling regarding medical conditions, current medications including side effects/adverse events, OTC medications uses/doses, home self care, red flag symptoms, and indications for immediate medical attention. The patient is receptive and expressed understanding and is agreeable to the plan. All questions answered before conclusion of this visit)    The reason for the audio only service rather than synchronous audio and video virtual visit was related to technical difficulties or patient preference/necessity.    Each patient to whom I provide medical services by telemedicine is:  (1) informed of the relationship between the physician and patient and the respective role of any other health care provider with respect to management of the patient; and (2) notified that they may decline to receive medical services by telemedicine and may withdraw from such care at any time. Patient verbally consented to receive this service via voice-only telephone call.    HPI:   Presents to the clinic via telemedicine audio only call.  Patient's  is also on the video call.  Patient endorsed left groin pain described as pricking and stabbing deep side, nothing on the outside.  Does not hurt with pushing or to touch.  Patient states that it is not really in the vagina itself however she was still like to be referred to OBGYN just to make sure it is not GYN related. States that she had similar symptoms to this in the past but on her R side. Was diagnosed with a dermoid cyst on the R side, resulting in her R ovary having to be taken out. Still has her uterus, and L ovary. She denies any  vaginal discharge, rash or lesions in the vaginal area as well as the perineum as well as the groin area.  Patient states that it has been going on for a long time at least a month but is worsening and is very painful. She noticed that the pain worsens with movement and lifting the leg up in the air which then has been resulting in her dragging that leg behind her why she is walking.  Over-the-counter NSAID has provided some relief. Has not tried any heating pad or topical agents. History of osteoarthritis in multiple joints. Sees ortho for knee injections. Had her R hip replaced.  States that the symptoms she had for her right hip were different than the symptoms she experiencing now so she does not believe that is her arthritis at this time and is more concerned that it could be due to her ovary.  It is any lower extremity swelling, erythema, lesions, lymphadenopathy in the groin.     Review of Systems   Constitutional:  Negative for chills, fatigue and fever.   Cardiovascular:  Negative for leg swelling.   Genitourinary:  Positive for pelvic pain. Negative for dysuria, flank pain and hematuria.   Musculoskeletal:  Positive for gait problem and leg pain.   Neurological:  Positive for weakness. Negative for dizziness, syncope, numbness, coordination difficulties and coordination difficulties.      Physical Exam:  Constitutional: No apparent distress, alert and oriented x3  Respiratory: No obvious shortness of breath, speaking in full sentences without difficulty  Neurologic: Speech clear and cognition intact   Psychiatric: Mood is appropriate     Assessment & Plan:  1. Left groin pain  Comments:  Multiple Etiologies: GYN, Ortho, Musculoskeletal,    Ordered XR of Left Hip, history of OA; start mobic daily   Ordered Pelvic US to evaluate lymph nodes/soft tissue and L ovary/uterus   Hx. of Kidney stones, ordered UA check for infection/blood  Orders:  -     meloxicam (MOBIC) 15 MG tablet; Take 1 tablet (15 mg total)  by mouth once daily.  Dispense: 20 tablet; Refill: 1  -     X-Ray Hip 2 or 3 views Left (with Pelvis when performed); Future; Expected date: 01/10/2023  -     Urinalysis, Reflex to Urine Culture Urine, Clean Catch  -     US Pelvis Complete Non OB; Future; Expected date: 01/10/2023    2. Age-related osteoporosis without current pathological fracture  Overview:  Tried Prolia/Bisphosphonates in the past  Declined further Screening - last DEXA in 2018   Ordered XR L hip and pelvis to r/o arthritis as cause of groin pain  Start Mobic.   Consider Ortho referral and physical therapy   Continue daily vitamin D and calcium supplementation  Orders:  -     X-Ray Hip 2 or 3 views Left (with Pelvis when performed); Future; Expected date: 01/10/2023    3. History of kidney stones  Assessment & Plan:  Denies hematuria, difficulty urinating, flank pain  Ordered UA to evaluate for blood/infection   Consider CT abdomen/pelvis w/o contrast for further evaluation   Orders:  -     Urinalysis, Reflex to Urine Culture Urine, Clean Catch    4. History of ovarian cyst  Assessment & Plan:  S/p R ovary removed   Ordered US Pelvis for further evaluation   Has GYN appointment upcoming   Orders:  -     US Pelvis Complete Non OB; Future; Expected date: 01/10/2023    This service was not originating from a related E/M service provided within the previous 7 days nor will  to an E/M service or procedure within the next 24 hours or my soonest available appointment.  Prevailing standard of care was able to be met in this audio-only visit.

## 2023-01-11 PROBLEM — Z87.442 HISTORY OF KIDNEY STONES: Status: ACTIVE | Noted: 2023-01-11

## 2023-01-11 PROBLEM — Z87.42 HISTORY OF OVARIAN CYST: Status: ACTIVE | Noted: 2023-01-11

## 2023-01-12 NOTE — ASSESSMENT & PLAN NOTE
Denies hematuria, difficulty urinating, flank pain  Ordered UA to evaluate for blood/infection   Consider CT abdomen/pelvis w/o contrast for further evaluation

## 2023-01-18 ENCOUNTER — HOSPITAL ENCOUNTER (OUTPATIENT)
Dept: RADIOLOGY | Facility: HOSPITAL | Age: 78
Discharge: HOME | End: 2023-01-18
Attending: STUDENT IN AN ORGANIZED HEALTH CARE EDUCATION/TRAINING PROGRAM
Payer: MEDICARE

## 2023-01-18 DIAGNOSIS — Z87.42 HISTORY OF OVARIAN CYST: ICD-10-CM

## 2023-01-18 DIAGNOSIS — R10.32 LEFT GROIN PAIN: ICD-10-CM

## 2023-01-18 DIAGNOSIS — M81.0 AGE-RELATED OSTEOPOROSIS WITHOUT CURRENT PATHOLOGICAL FRACTURE: ICD-10-CM

## 2023-01-18 PROCEDURE — 73502 X-RAY EXAM HIP UNI 2-3 VIEWS: CPT | Mod: TC,LT

## 2023-01-18 PROCEDURE — 76856 US EXAM PELVIC COMPLETE: CPT | Mod: TC

## 2023-01-23 ENCOUNTER — LAB VISIT (OUTPATIENT)
Dept: LAB | Facility: HOSPITAL | Age: 78
End: 2023-01-23
Attending: STUDENT IN AN ORGANIZED HEALTH CARE EDUCATION/TRAINING PROGRAM
Payer: MEDICARE

## 2023-01-23 DIAGNOSIS — Z00.00 WELLNESS EXAMINATION: ICD-10-CM

## 2023-01-23 DIAGNOSIS — E78.2 MIXED HYPERLIPIDEMIA: ICD-10-CM

## 2023-01-23 DIAGNOSIS — M81.0 AGE-RELATED OSTEOPOROSIS WITHOUT CURRENT PATHOLOGICAL FRACTURE: ICD-10-CM

## 2023-01-23 DIAGNOSIS — K59.04 CHRONIC IDIOPATHIC CONSTIPATION: ICD-10-CM

## 2023-01-23 DIAGNOSIS — I10 PRIMARY HYPERTENSION: ICD-10-CM

## 2023-01-23 PROBLEM — Z87.442 HISTORY OF KIDNEY STONES: Chronic | Status: ACTIVE | Noted: 2023-01-11

## 2023-01-23 PROBLEM — Z87.42 HISTORY OF OVARIAN CYST: Chronic | Status: ACTIVE | Noted: 2023-01-11

## 2023-01-23 LAB
ALBUMIN SERPL-MCNC: 4.1 G/DL (ref 3.4–4.8)
ALBUMIN/GLOB SERPL: 1.4 RATIO (ref 1.1–2)
ALP SERPL-CCNC: 95 UNIT/L (ref 40–150)
ALT SERPL-CCNC: 17 UNIT/L (ref 0–55)
APPEARANCE UR: CLEAR
AST SERPL-CCNC: 17 UNIT/L (ref 5–34)
BACTERIA #/AREA URNS AUTO: NORMAL /HPF
BASOPHILS # BLD AUTO: 0.06 X10(3)/MCL (ref 0–0.2)
BASOPHILS NFR BLD AUTO: 0.8 %
BILIRUB UR QL STRIP.AUTO: NEGATIVE MG/DL
BILIRUBIN DIRECT+TOT PNL SERPL-MCNC: 0.7 MG/DL
BUN SERPL-MCNC: 26.3 MG/DL (ref 9.8–20.1)
CALCIUM SERPL-MCNC: 10 MG/DL (ref 8.4–10.2)
CHLORIDE SERPL-SCNC: 104 MMOL/L (ref 98–107)
CHOLEST SERPL-MCNC: 220 MG/DL
CHOLEST/HDLC SERPL: 4 {RATIO} (ref 0–5)
CO2 SERPL-SCNC: 28 MMOL/L (ref 23–31)
COLOR UR AUTO: YELLOW
CREAT SERPL-MCNC: 1.12 MG/DL (ref 0.55–1.02)
EOSINOPHIL # BLD AUTO: 0.09 X10(3)/MCL (ref 0–0.9)
EOSINOPHIL NFR BLD AUTO: 1.3 %
ERYTHROCYTE [DISTWIDTH] IN BLOOD BY AUTOMATED COUNT: 13.9 % (ref 11.5–17)
EST. AVERAGE GLUCOSE BLD GHB EST-MCNC: 102.5 MG/DL
GFR SERPLBLD CREATININE-BSD FMLA CKD-EPI: 51 MLS/MIN/1.73/M2
GLOBULIN SER-MCNC: 2.9 GM/DL (ref 2.4–3.5)
GLUCOSE SERPL-MCNC: 86 MG/DL (ref 82–115)
GLUCOSE UR QL STRIP.AUTO: NEGATIVE MG/DL
HBA1C MFR BLD: 5.2 %
HCT VFR BLD AUTO: 40.1 % (ref 37–47)
HDLC SERPL-MCNC: 55 MG/DL (ref 35–60)
HGB BLD-MCNC: 12.5 GM/DL (ref 12–16)
IMM GRANULOCYTES # BLD AUTO: 0.02 X10(3)/MCL (ref 0–0.04)
IMM GRANULOCYTES NFR BLD AUTO: 0.3 %
KETONES UR QL STRIP.AUTO: NEGATIVE MG/DL
LDLC SERPL CALC-MCNC: 147 MG/DL (ref 50–140)
LEUKOCYTE ESTERASE UR QL STRIP.AUTO: ABNORMAL UNIT/L
LYMPHOCYTES # BLD AUTO: 2.38 X10(3)/MCL (ref 0.6–4.6)
LYMPHOCYTES NFR BLD AUTO: 33.5 %
MCH RBC QN AUTO: 29.8 PG
MCHC RBC AUTO-ENTMCNC: 31.2 MG/DL (ref 33–36)
MCV RBC AUTO: 95.7 FL (ref 80–94)
MONOCYTES # BLD AUTO: 0.5 X10(3)/MCL (ref 0.1–1.3)
MONOCYTES NFR BLD AUTO: 7 %
NEUTROPHILS # BLD AUTO: 4.06 X10(3)/MCL (ref 2.1–9.2)
NEUTROPHILS NFR BLD AUTO: 57.1 %
NITRITE UR QL STRIP.AUTO: NEGATIVE
NRBC BLD AUTO-RTO: 0 %
PH UR STRIP.AUTO: 7.5 [PH]
PLATELET # BLD AUTO: 347 X10(3)/MCL (ref 130–400)
PMV BLD AUTO: 9.3 FL (ref 7.4–10.4)
POTASSIUM SERPL-SCNC: 4.8 MMOL/L (ref 3.5–5.1)
PROT SERPL-MCNC: 7 GM/DL (ref 5.8–7.6)
PROT UR QL STRIP.AUTO: NEGATIVE MG/DL
RBC # BLD AUTO: 4.19 X10(6)/MCL (ref 4.2–5.4)
RBC #/AREA URNS AUTO: <5 /HPF
RBC UR QL AUTO: NEGATIVE UNIT/L
SODIUM SERPL-SCNC: 139 MMOL/L (ref 136–145)
SP GR UR STRIP.AUTO: 1.02 (ref 1–1.03)
SQUAMOUS #/AREA URNS AUTO: <5 /HPF
TRIGL SERPL-MCNC: 88 MG/DL (ref 37–140)
TSH SERPL-ACNC: 1.36 UIU/ML (ref 0.35–4.94)
UROBILINOGEN UR STRIP-ACNC: 0.2 MG/DL
VLDLC SERPL CALC-MCNC: 18 MG/DL
WBC # SPEC AUTO: 7.1 X10(3)/MCL (ref 4.5–11.5)
WBC #/AREA URNS AUTO: <5 /HPF

## 2023-01-23 PROCEDURE — 80061 LIPID PANEL: CPT

## 2023-01-23 PROCEDURE — 80053 COMPREHEN METABOLIC PANEL: CPT

## 2023-01-23 PROCEDURE — 36415 COLL VENOUS BLD VENIPUNCTURE: CPT

## 2023-01-23 PROCEDURE — 85025 COMPLETE CBC W/AUTO DIFF WBC: CPT

## 2023-01-23 PROCEDURE — 84443 ASSAY THYROID STIM HORMONE: CPT

## 2023-01-23 PROCEDURE — 83036 HEMOGLOBIN GLYCOSYLATED A1C: CPT

## 2023-01-23 NOTE — ASSESSMENT & PLAN NOTE
Continue Daily Calcium and Vitamin D supplementation   Ordered XR L hip with pelvis, to r/o arthritis as cause of pain  Consider Ortho referral and physical therapy

## 2023-01-30 ENCOUNTER — OFFICE VISIT (OUTPATIENT)
Dept: PRIMARY CARE CLINIC | Facility: CLINIC | Age: 78
End: 2023-01-30
Payer: MEDICARE

## 2023-01-30 VITALS
BODY MASS INDEX: 20.8 KG/M2 | TEMPERATURE: 98 F | OXYGEN SATURATION: 97 % | WEIGHT: 113 LBS | DIASTOLIC BLOOD PRESSURE: 79 MMHG | RESPIRATION RATE: 16 BRPM | SYSTOLIC BLOOD PRESSURE: 138 MMHG | HEIGHT: 62 IN

## 2023-01-30 DIAGNOSIS — Z87.19 HISTORY OF ANAL FISSURES: ICD-10-CM

## 2023-01-30 DIAGNOSIS — R79.89 ELEVATED SERUM CREATININE: ICD-10-CM

## 2023-01-30 DIAGNOSIS — Z78.0 POST-MENOPAUSAL: ICD-10-CM

## 2023-01-30 DIAGNOSIS — Z86.19 HX OF COLD SORES: ICD-10-CM

## 2023-01-30 DIAGNOSIS — T46.6X5A STATIN MYOPATHY: ICD-10-CM

## 2023-01-30 DIAGNOSIS — E78.2 MIXED HYPERLIPIDEMIA: ICD-10-CM

## 2023-01-30 DIAGNOSIS — R71.8 RBC ABNORMALITY: ICD-10-CM

## 2023-01-30 DIAGNOSIS — K64.9 HEMORRHOIDS, UNSPECIFIED HEMORRHOID TYPE: ICD-10-CM

## 2023-01-30 DIAGNOSIS — I10 PRIMARY HYPERTENSION: ICD-10-CM

## 2023-01-30 DIAGNOSIS — G72.0 STATIN MYOPATHY: ICD-10-CM

## 2023-01-30 DIAGNOSIS — Z00.00 WELLNESS EXAMINATION: Primary | ICD-10-CM

## 2023-01-30 DIAGNOSIS — K21.9 GASTROESOPHAGEAL REFLUX DISEASE WITHOUT ESOPHAGITIS: ICD-10-CM

## 2023-01-30 DIAGNOSIS — R10.32 LEFT GROIN PAIN: ICD-10-CM

## 2023-01-30 DIAGNOSIS — D75.89 MACROCYTOSIS WITHOUT ANEMIA: ICD-10-CM

## 2023-01-30 PROBLEM — K59.00 CONSTIPATION: Status: ACTIVE | Noted: 2023-01-30

## 2023-01-30 PROCEDURE — 3078F DIAST BP <80 MM HG: CPT | Mod: CPTII,,, | Performed by: STUDENT IN AN ORGANIZED HEALTH CARE EDUCATION/TRAINING PROGRAM

## 2023-01-30 PROCEDURE — 1159F PR MEDICATION LIST DOCUMENTED IN MEDICAL RECORD: ICD-10-PCS | Mod: CPTII,,, | Performed by: STUDENT IN AN ORGANIZED HEALTH CARE EDUCATION/TRAINING PROGRAM

## 2023-01-30 PROCEDURE — 1160F PR REVIEW ALL MEDS BY PRESCRIBER/CLIN PHARMACIST DOCUMENTED: ICD-10-PCS | Mod: CPTII,,, | Performed by: STUDENT IN AN ORGANIZED HEALTH CARE EDUCATION/TRAINING PROGRAM

## 2023-01-30 PROCEDURE — 1101F PT FALLS ASSESS-DOCD LE1/YR: CPT | Mod: CPTII,,, | Performed by: STUDENT IN AN ORGANIZED HEALTH CARE EDUCATION/TRAINING PROGRAM

## 2023-01-30 PROCEDURE — 3288F FALL RISK ASSESSMENT DOCD: CPT | Mod: CPTII,,, | Performed by: STUDENT IN AN ORGANIZED HEALTH CARE EDUCATION/TRAINING PROGRAM

## 2023-01-30 PROCEDURE — 3075F SYST BP GE 130 - 139MM HG: CPT | Mod: CPTII,,, | Performed by: STUDENT IN AN ORGANIZED HEALTH CARE EDUCATION/TRAINING PROGRAM

## 2023-01-30 PROCEDURE — 1101F PR PT FALLS ASSESS DOC 0-1 FALLS W/OUT INJ PAST YR: ICD-10-PCS | Mod: CPTII,,, | Performed by: STUDENT IN AN ORGANIZED HEALTH CARE EDUCATION/TRAINING PROGRAM

## 2023-01-30 PROCEDURE — G0439 PPPS, SUBSEQ VISIT: HCPCS | Mod: ,,, | Performed by: STUDENT IN AN ORGANIZED HEALTH CARE EDUCATION/TRAINING PROGRAM

## 2023-01-30 PROCEDURE — 3075F PR MOST RECENT SYSTOLIC BLOOD PRESS GE 130-139MM HG: ICD-10-PCS | Mod: CPTII,,, | Performed by: STUDENT IN AN ORGANIZED HEALTH CARE EDUCATION/TRAINING PROGRAM

## 2023-01-30 PROCEDURE — G0439 PR MEDICARE ANNUAL WELLNESS SUBSEQUENT VISIT: ICD-10-PCS | Mod: ,,, | Performed by: STUDENT IN AN ORGANIZED HEALTH CARE EDUCATION/TRAINING PROGRAM

## 2023-01-30 PROCEDURE — 1125F PR PAIN SEVERITY QUANTIFIED, PAIN PRESENT: ICD-10-PCS | Mod: CPTII,,, | Performed by: STUDENT IN AN ORGANIZED HEALTH CARE EDUCATION/TRAINING PROGRAM

## 2023-01-30 PROCEDURE — 1159F MED LIST DOCD IN RCRD: CPT | Mod: CPTII,,, | Performed by: STUDENT IN AN ORGANIZED HEALTH CARE EDUCATION/TRAINING PROGRAM

## 2023-01-30 PROCEDURE — 1125F AMNT PAIN NOTED PAIN PRSNT: CPT | Mod: CPTII,,, | Performed by: STUDENT IN AN ORGANIZED HEALTH CARE EDUCATION/TRAINING PROGRAM

## 2023-01-30 PROCEDURE — 3288F PR FALLS RISK ASSESSMENT DOCUMENTED: ICD-10-PCS | Mod: CPTII,,, | Performed by: STUDENT IN AN ORGANIZED HEALTH CARE EDUCATION/TRAINING PROGRAM

## 2023-01-30 PROCEDURE — 3078F PR MOST RECENT DIASTOLIC BLOOD PRESSURE < 80 MM HG: ICD-10-PCS | Mod: CPTII,,, | Performed by: STUDENT IN AN ORGANIZED HEALTH CARE EDUCATION/TRAINING PROGRAM

## 2023-01-30 PROCEDURE — 1160F RVW MEDS BY RX/DR IN RCRD: CPT | Mod: CPTII,,, | Performed by: STUDENT IN AN ORGANIZED HEALTH CARE EDUCATION/TRAINING PROGRAM

## 2023-01-30 RX ORDER — OMEPRAZOLE 40 MG/1
40 CAPSULE, DELAYED RELEASE ORAL DAILY
Qty: 90 CAPSULE | Refills: 3 | Status: SHIPPED | OUTPATIENT
Start: 2023-01-30

## 2023-01-30 RX ORDER — HYDROCORTISONE 1 %
CREAM (GRAM) TOPICAL 2 TIMES DAILY PRN
Qty: 30 G | Refills: 2 | Status: SHIPPED | OUTPATIENT
Start: 2023-01-30

## 2023-01-30 RX ORDER — EZETIMIBE 10 MG/1
10 TABLET ORAL DAILY
Qty: 90 TABLET | Refills: 3 | Status: SHIPPED | OUTPATIENT
Start: 2023-01-30

## 2023-01-30 RX ORDER — MELOXICAM 15 MG/1
15 TABLET ORAL DAILY
Qty: 30 TABLET | Refills: 1 | Status: SHIPPED | OUTPATIENT
Start: 2023-01-30 | End: 2023-05-08

## 2023-01-30 RX ORDER — ACYCLOVIR 50 MG/G
OINTMENT TOPICAL 4 TIMES DAILY PRN
Qty: 30 G | Refills: 2 | Status: SHIPPED | OUTPATIENT
Start: 2023-01-30

## 2023-01-30 RX ORDER — LISINOPRIL 10 MG/1
10 TABLET ORAL DAILY
Qty: 90 TABLET | Refills: 3 | Status: SHIPPED | OUTPATIENT
Start: 2023-01-30

## 2023-01-30 NOTE — PROGRESS NOTES
Medicare Annual Wellness Visit     Patient ID: [id number]     Chief Complaint: Medicare AWV, Back Pain, and Constipation (Bleedings with bowel movement)    HPI:     Sherry Heredia is a 77 y.o. female here today for a Medicare Wellness. Reviewed labs, answered all questions/concerns at this time. Has upcoming GYN appointment, concerned about US finding. Endorsed back/hip pain. Also episodic constipation with flare up of hemorrhoids/anal fissures.     Opioid Screening: Patient medication list reviewed, patient is not taking prescription opioids. Patient is not using additional opioids than prescribed. Patient is at low risk of substance abuse based on this opioid use history.     Past Medical History:   Age-related nuclear cataract of both eyes  Chronic idiopathic constipation  Claustrophobia  Diverticulosis   Dry eyes  Gastroesophageal reflux disease without esophagitis  [place] (hard of hearing)  Left knee pain  Migraine headache  Mixed hyperlipidemia  Primary hypertension  History of renal stones  Right hip pain s/p Right hip replacement 2019  Wears glasses     Past Surgical History:   Procedure Laterality Date    CHOLECYSTECTOMY      COLONOSCOPY      Dr Love 10/2021    JOINT REPLACEMENT      mammogram 07/2021      [facility] breast [facility]    [facility]      2019     Review of patient's allergies indicates:   Allergen Reactions    Penicillin g benzathine      Other reaction(s): Unknown    Statins-hmg-coa reductase inhibitors Other (See Comments)     Muscle Aches      Sulfamethoxazole      Other reaction(s): itcing, red splotches    Codeine Nausea And Vomiting       Outpatient Medications Marked as Taking for the 1/30/23 encounter (Office Visit) with Homa Dey MD   Medication Sig Dispense Refill    artificial tears ointment (REFRESH P.M.) Oint every evening. Refresh ointment in right eye @ night      calcium carbonate/vitamin D3 (CALTRATE 600 + D ORAL) Take 1 tablet by mouth once daily at 6am.      conjugated  estrogens (PREMARIN) vaginal cream Place 0.5 g vaginally once daily for 14 days, THEN 0.5 g twice a week. 2 applicator 0    erythromycin (ROMYCIN) ophthalmic ointment SMARTSIG:Sparingly In Eye(s) Every Night      eszopiclone (LUNESTA) 3 mg Tab Take 1 tablet (3 mg total) by mouth nightly as needed (Insomnia). 30 tablet 3    Lactobacillus acidophilus (PROBIOTIC ACIDOPHILUS ORAL) Take by mouth. Jose-biotic 1 daily      moxifloxacin (VIGAMOX) 0.5 % ophthalmic solution Place 1 drop into the right eye 4 (four) times daily.      multivitamin/iron/folic acid (CENTRUM ORAL) Take by mouth. By mouth daily      RESTASIS 0.05 % ophthalmic emulsion Place 1 drop into both eyes 2 (two) times daily.      valACYclovir (VALTREX) 1000 MG tablet See Instructions, TAKE TWO TABLETS TWICE DAILY FOR 2 DOSES PRN FOR FEVER BLISTERS, # 10 tab(s), 3 Refill(s), Pharmacy: [facility], 157, cm, Height/Length Dosing, 01/27/22 8:56:00 CST, 51.95, kg, Weight Dosing, 01/27/22 8:56:00 CST 20 tablet 3    [DISCONTINUED] ezetimibe (ZETIA) 10 mg tablet Take 1 tablet (10 mg total) by mouth once daily. 30 tablet 1    [DISCONTINUED] lisinopriL 10 MG tablet Take 1 tablet (10 mg total) by mouth once daily. 30 tablet 3    [DISCONTINUED] meloxicam (MOBIC) 15 MG tablet Take 1 tablet (15 mg total) by mouth once daily. 20 tablet 1    [DISCONTINUED] omeprazole (PRILOSEC) 40 MG capsule Take 1 capsule (40 mg total) by mouth once daily. 90 capsule 3     Social History     Socioeconomic History    Marital status: Other   Tobacco Use    Smoking status: Never    Smokeless tobacco: Never   Substance and Sexual Activity    Alcohol use: Yes     Comment: 1-2 glasses of wine per year    Drug use: Never   Social History Narrative    ** Merged History Encounter **         ** Merged History Encounter **          Family History   Problem Relation Age of Onset    Heart disease Mother     Heart disease Father       Patient Care Team:  Homa Dey MD as PCP - General  (Internal Medicine)     Subjective:     Review of Systems   Constitutional:  Negative for chills and fever.   Respiratory:  Negative for cough and shortness of breath.    Cardiovascular:  Negative for chest pain and leg swelling.   Gastrointestinal:  Positive for constipation. Negative for abdominal pain, diarrhea, nausea and vomiting.   Genitourinary:  Negative for dysuria and hematuria.   Musculoskeletal:  Positive for back pain.   Neurological:  Negative for dizziness and headaches.   Psychiatric/Behavioral:  Negative for depression. The patient does not have insomnia.      Patient Reported Health Risk Assessment  What [age] your [age]?: 70-79  Are you male or female?: Female  During the past four weeks, how much have you been bothered by emotional problems such as feeling anxious, depressed, irritable, sad, or downhearted and blue?: Not at all  During the past five weeks, has your physical and/or emotional health limited your social activities with family, friends, neighbors, or groups?: Moderately  During the past four weeks, how much bodily pain have you generally had?: Mild pain  During the past four weeks, was someone available to help if you needed and wanted help?: Yes, as much as I wanted  During the past four weeks, what was the hardest physical activity you could do for at least two minutes?: Moderate  Can you get to places out of walking distance without help?  (For example, can you travel alone on buses or taxis, or drive your own car?): Yes  Can you go shopping for groceries or clothes without someone's help?: Yes  Can you prepare your own meals?: Yes  Can you do your own housework without help?: Yes  Because of any health problems, do you need the help of another person with your personal care needs such as eating, bathing, dressing, or getting around the house?: No  Can you handle your own money without help?: Yes  During the past four weeks, how would you rate your health in general?: Good  How have  "things been going for you during the past four weeks?: Pretty well  Are you having difficulties driving your car?: Not applicable, I do not use a car  Do you always fasten your seat belt when you are in a car?: Yes, usually  How often in the past four weeks have you been bothered by falling or dizzy when standing up?: Never  How often in the past four weeks have you been bothered by sexual problems?: Sometimes  How often in the past four weeks have you been bothered by trouble eating well?: Never  How often in the past four weeks have you been bothered by teeth or denture problems?: Never  How often in the past four weeks have you been bothered with problems using the telephone?: Never  How often in the past four weeks have you been bothered by tiredness or fatigue?: Never  Have you fallen two or more times in the past year?: No  Are you afraid of falling?: No  Are you a smoker?: No  During the past four weeks, how many drinks of wine, beer, or other alcoholic beverages did you have?: 2-5 drinks per weeks  Do you exercise for about 20 minutes three or more days a week?: No, I usually do not exercise this much  Have you been given any information to help you with hazards in your house that might hurt you?: Yes  Have you been given any information to help you with keeping track of your medications?: No  How often do you have trouble taking medicines the way you've been told to take them?: I always take them as prescribed  How confident are you that you can control and manage most of your health problems?: Somewhat confident  What is your race? (Check all that apply.):     Objective:     /79 (BP Location: Left arm)   Temp 98.2 °F (36.8 °C)   Resp 16   Ht 5' 2" (1.575 m)   Wt 51.3 kg (113 lb)   SpO2 97%   BMI 20.67 kg/m²     Physical Exam  Vitals and nursing note reviewed.   Constitutional:       General: She is not in acute distress.     Appearance: Normal appearance. She is not ill-appearing. "   Eyes:      General: No scleral icterus.     Extraocular Movements: Extraocular movements intact.      Conjunctiva/sclera: Conjunctivae normal.      Pupils: Pupils are equal, round, and reactive to light.   Cardiovascular:      Rate and Rhythm: Normal rate and regular rhythm.      Pulses: Normal pulses.      Heart sounds: Normal heart sounds. No murmur heard.  Pulmonary:      Effort: Pulmonary effort is normal. No respiratory distress.      Breath sounds: Normal breath sounds. No wheezing.   Abdominal:      General: There is no distension.      Palpations: Abdomen is soft.      Tenderness: There is no abdominal tenderness.   Musculoskeletal:      Right lower leg: No edema.      Left lower leg: No edema.   Skin:     General: Skin is warm.      Coloration: Skin is not jaundiced.   Neurological:      Mental Status: She is alert. Mental status is at baseline.      Gait: Gait normal.   Psychiatric:         Mood and Affect: Mood normal.         Behavior: Behavior normal.     Fall Risk Assessment - Outpatient 1/30/2023 6/7/2022 5/23/2022   Mobility Status Ambulatory Ambulatory Ambulatory   Number of falls 0 - 0   Identified as fall risk 0 - 0     Depression Screening  Over the past two weeks, has the patient felt down, depressed, or hopeless?: No  Over the past two weeks, has the patient felt little interest or pleasure in doing things?: No  Functional Ability/Safety Screening  Was the patient's timed Up & Go test unsteady or longer than 30 seconds?: No  Does the patient need help with phone, transportation, shopping, preparing meals, housework, laundry, meds, or managing money?: No  Does the patient's home have rugs in the hallway, lack grab bars in the bathroom, lack handrails on the stairs or have poor lighting?: No  Have you noticed any hearing difficulties?: No  Cognitive Function (Assessed through direct observation with due consideration of information obtained by way of patient reports and/or concerns raised by  family, friends, caretakers, or others)    Does the patient repeat questions/statements in the same day?: No  Does the patient have trouble remembering the date, year, and time?: No  Does the patient have difficulty managing finances?: No  Does the patient have a decreased sense of direction?: No    Assessment/Plan:     1. Wellness examination  Routine Health Maintenance   Exercise as tolerated, >30 minutes a day for 3-5 days a week.  Counseled patient on compliance with medications and healthy diet.     Age-Appropriate Screening  Vaccinations:    - Flu: UTD, 2022   - Pneumonia: UTD   - Shingles (>50): Recommend local pharmacy    - Tdap: UTD, 2016   - COVID: 2 dose series, 2 boosters     Screening:   - Pap Smear (21-65): Aged out, has appointment with GYN, will defer   - Mammogram (40-50 discuss w/ pt, all >50): Ordered on 5/2022   - Osteoporosis (all>65, sooner if risk factors): Ordered today   - Lung Ca. Screening (50-80 if >20 pack yrs current or quit <15 yrs): N/A   - Colon Ca. Screening (age >45): UTD, 2021    2. Mixed hyperlipidemia  Overview:  ASCVD Score = 19.8%   Unable to tolerate Statin Therapy - severe muscle aches   Assessment & Plan:  Last Lipid Panel in 1/2022 showed elevated total cholesterol and LDL  Continue Zetia  Counseled on dietary modifications  Counseled to exercise 150 minutes weekly as exercise raises HDL and lowers LDL   Orders:  -     ezetimibe (ZETIA) 10 mg tablet; Take 1 tablet (10 mg total) by mouth once daily.  Dispense: 90 tablet; Refill: 3    3. Statin myopathy  Comments:  See above for further details     4. Primary hypertension  Assessment & Plan:  Today's BP WNL  Continue Lisinopril   Counseled on low sodium diet, exercise, tobacco avoidance, and limiting alcohol intake   Pt. Has home BP cuff, instructed to measure home BP and report abnormal values   Orders:  -     lisinopriL 10 MG tablet; Take 1 tablet (10 mg total) by mouth once daily.  Dispense: 90 tablet; Refill: 3    5.  Gastroesophageal reflux disease without esophagitis  -     omeprazole (PRILOSEC) 40 MG capsule; Take 1 capsule (40 mg total) by mouth once daily.  Dispense: 90 capsule; Refill: 3    6. Hx of cold sores  Assessment & Plan:  Stable  Take Valtrex at first sign of break out.  Orders:  -     acyclovir 5% (ZOVIRAX) 5 % ointment; Apply topically 4 (four) times daily as needed (fever blister).  Dispense: 30 g; Refill: 2    7. Left groin pain  Comments:  Multiple Etiologies: GYN, Ortho, Musculoskeletal,    Ordered XR of Left Hip   Ordered Pelvic US to evaluate lymph nodes/soft tissue and L ovary   Hx. of Kidne  Assessment & Plan:  Fluctuating between the groin, hip and back, suspect arthritis as the cause   Use Mobic as needed  Consider physical therapy  If worsens consider ortho referral   Orders:  -     meloxicam (MOBIC) 15 MG tablet; Take 1 tablet (15 mg total) by mouth once daily.  Dispense: 30 tablet; Refill: 1    8. Hemorrhoids, unspecified hemorrhoid type  Assessment & Plan:  No acute flares at this time   Encouraged p.o. hydration and increase fiber intake   If worsens consider GI referral  Continue over-the-counter anti constipation medications  Orders:  -     hydrocortisone 1 % cream; Apply topically 2 (two) times daily as needed (fissures, hemorrhoids).  Dispense: 30 g; Refill: 2    9. History of anal fissures  -     hydrocortisone 1 % cream; Apply topically 2 (two) times daily as needed (fissures, hemorrhoids).  Dispense: 30 g; Refill: 2    10. RBC abnormality  -     Iron and TIBC; Future; Expected date: 02/12/2023  -     Ferritin; Future; Expected date: 02/12/2023    11. Macrocytosis without anemia  Assessment & Plan:  Stable will check vitamin deficiencies  Orders:  -     Vitamin B12; Future; Expected date: 02/12/2023  -     Folate; Future; Expected date: 02/12/2023    12. Elevated serum creatinine  Comments:  Monitor, limit NSAIDs to just as needed, use Tylenol first  Repeat CMP, encouraged PO hydration    Orders:  -     Comprehensive Metabolic Panel; Future; Expected date: 02/12/2023    Medicare Annual Wellness and Personalized Prevention Plan:   Fall Risk + Home Safety + Hearing Impairment + Depression Screen + Opioid and Substance Abuse Screening + Cognitive Impairment Screen + Health Risk Assessment all reviewed.     Advance Care Planning   I attest to discussing Advance Care Planning with patient and/or family member.  Education was provided including the importance of the Health Care Power of , Advance Directives, and/or [place] documentation.  The patient expressed understanding to the importance of this information and discussion.     Follow up in about 3 months (around 4/30/2023). In addition to their scheduled follow up, the patient has also been instructed to follow up on as needed basis.

## 2023-02-12 PROBLEM — K21.9 GASTROESOPHAGEAL REFLUX DISEASE WITHOUT ESOPHAGITIS: Chronic | Status: ACTIVE | Noted: 2022-05-23

## 2023-02-13 PROBLEM — K64.9 HEMORRHOIDS: Chronic | Status: ACTIVE | Noted: 2023-01-30

## 2023-02-13 PROBLEM — T46.6X5A STATIN MYOPATHY: Status: ACTIVE | Noted: 2023-02-13

## 2023-02-13 PROBLEM — Z86.19 HX OF COLD SORES: Chronic | Status: ACTIVE | Noted: 2022-05-23

## 2023-02-13 PROBLEM — E78.2 MIXED HYPERLIPIDEMIA: Chronic | Status: ACTIVE | Noted: 2022-05-23

## 2023-02-13 PROBLEM — I10 PRIMARY HYPERTENSION: Chronic | Status: ACTIVE | Noted: 2022-05-23

## 2023-02-13 PROBLEM — R10.32 LEFT GROIN PAIN: Chronic | Status: ACTIVE | Noted: 2023-01-30

## 2023-02-13 PROBLEM — G72.0 STATIN MYOPATHY: Status: ACTIVE | Noted: 2023-02-13

## 2023-02-13 PROBLEM — Z87.19 HISTORY OF ANAL FISSURES: Chronic | Status: ACTIVE | Noted: 2023-01-30

## 2023-02-13 NOTE — ASSESSMENT & PLAN NOTE
No acute flares at this time   Encouraged p.o. hydration and increase fiber intake   If worsens consider GI referral  Continue over-the-counter anti constipation medications

## 2023-02-13 NOTE — ASSESSMENT & PLAN NOTE
Last Lipid Panel in 1/2022 showed elevated total cholesterol and LDL  Continue Zetia  Counseled on dietary modifications  Counseled to exercise 150 minutes weekly as exercise raises HDL and lowers LDL

## 2023-02-13 NOTE — ASSESSMENT & PLAN NOTE
Fluctuating between the groin, hip and back, suspect arthritis as the cause   Use Mobic as needed  Consider physical therapy  If worsens consider ortho referral

## 2023-02-22 ENCOUNTER — LAB VISIT (OUTPATIENT)
Dept: LAB | Facility: HOSPITAL | Age: 78
End: 2023-02-22
Attending: STUDENT IN AN ORGANIZED HEALTH CARE EDUCATION/TRAINING PROGRAM
Payer: MEDICARE

## 2023-02-22 DIAGNOSIS — D75.89 MACROCYTOSIS WITHOUT ANEMIA: ICD-10-CM

## 2023-02-22 DIAGNOSIS — R71.8 RBC ABNORMALITY: ICD-10-CM

## 2023-02-22 DIAGNOSIS — R79.89 ELEVATED SERUM CREATININE: ICD-10-CM

## 2023-02-22 LAB
ALBUMIN SERPL-MCNC: 4.2 G/DL (ref 3.4–4.8)
ALBUMIN/GLOB SERPL: 1.5 RATIO (ref 1.1–2)
ALP SERPL-CCNC: 98 UNIT/L (ref 40–150)
ALT SERPL-CCNC: 15 UNIT/L (ref 0–55)
AST SERPL-CCNC: 18 UNIT/L (ref 5–34)
BILIRUBIN DIRECT+TOT PNL SERPL-MCNC: 0.3 MG/DL
BUN SERPL-MCNC: 22.1 MG/DL (ref 9.8–20.1)
CALCIUM SERPL-MCNC: 10 MG/DL (ref 8.4–10.2)
CHLORIDE SERPL-SCNC: 101 MMOL/L (ref 98–107)
CO2 SERPL-SCNC: 29 MMOL/L (ref 23–31)
CREAT SERPL-MCNC: 0.98 MG/DL (ref 0.55–1.02)
FERRITIN SERPL-MCNC: 89.36 NG/ML (ref 4.63–204)
FOLATE SERPL-MCNC: 37.3 NG/ML (ref 7–31.4)
GFR SERPLBLD CREATININE-BSD FMLA CKD-EPI: 60 MLS/MIN/1.73/M2
GLOBULIN SER-MCNC: 2.8 GM/DL (ref 2.4–3.5)
GLUCOSE SERPL-MCNC: 94 MG/DL (ref 82–115)
IRON SATN MFR SERPL: 31 % (ref 20–50)
IRON SERPL-MCNC: 79 UG/DL (ref 50–170)
POTASSIUM SERPL-SCNC: 4.8 MMOL/L (ref 3.5–5.1)
PROT SERPL-MCNC: 7 GM/DL (ref 5.8–7.6)
SODIUM SERPL-SCNC: 138 MMOL/L (ref 136–145)
TIBC SERPL-MCNC: 178 UG/DL (ref 70–310)
TIBC SERPL-MCNC: 257 UG/DL (ref 250–450)
VIT B12 SERPL-MCNC: 746 PG/ML (ref 213–816)

## 2023-02-22 PROCEDURE — 83550 IRON BINDING TEST: CPT

## 2023-02-22 PROCEDURE — 36415 COLL VENOUS BLD VENIPUNCTURE: CPT

## 2023-02-22 PROCEDURE — 82746 ASSAY OF FOLIC ACID SERUM: CPT

## 2023-02-22 PROCEDURE — 80053 COMPREHEN METABOLIC PANEL: CPT

## 2023-02-22 PROCEDURE — 82728 ASSAY OF FERRITIN: CPT

## 2023-02-22 PROCEDURE — 82607 VITAMIN B-12: CPT

## 2023-03-01 ENCOUNTER — TELEPHONE (OUTPATIENT)
Dept: PRIMARY CARE CLINIC | Facility: CLINIC | Age: 78
End: 2023-03-01
Payer: MEDICARE

## 2023-03-01 NOTE — TELEPHONE ENCOUNTER
----- Message from Talita Eaton sent at 3/1/2023  9:35 AM CST -----  Regarding: General Message  General Message;      Patricia pardo/ Tracee Mon office @ [phone] called requesting pt's latest blood work that was done on 02/22 faxed over to her at [phone]. Pt is having surgery 03/02.    Thanks

## 2023-03-01 NOTE — TELEPHONE ENCOUNTER
----- Message from [facility] sent at 3/1/2023 10:43 AM CST -----  Regarding: need results  .Type:  Needs Medical Advice    Who Called:  Dr Mon office    Would the patient rather a call back? Yes    Best Call Back Number:  [phone] fax# [phone]    Additional Information:  results from CMP that was done 2-22-23 pt is having surgery tomorrow 3-2-23 need results ASAP

## 2023-03-09 ENCOUNTER — DOCUMENTATION ONLY (OUTPATIENT)
Dept: PRIMARY CARE CLINIC | Facility: CLINIC | Age: 78
End: 2023-03-09
Payer: MEDICARE

## 2023-03-16 ENCOUNTER — TELEPHONE (OUTPATIENT)
Dept: PRIMARY CARE CLINIC | Facility: CLINIC | Age: 78
End: 2023-03-16
Payer: MEDICARE

## 2023-03-16 NOTE — TELEPHONE ENCOUNTER
----- Message from Talita Eaton sent at 3/16/2023  2:13 PM CDT -----  Regarding: Refill Request  .Type:  RX Refill Request    Who Called: pt  Refill or New Rx:refill   RX Name and Strength:eszopiclone (LUNESTA) 3 mg Tab  How is the patient currently taking it? (ex. 1XDay):1X  Is this a 30 day or 90 day RX:30  Preferred Pharmacy with phone number:[facility] [address]  Local or Mail Order:local   Ordering Provider:oHma Dey  Would the patient rather a call back or a response via [facility]?   Best Call Back Number:[phone]  Additional Information: pt is requesting an refill

## 2023-03-20 DIAGNOSIS — G47.00 INSOMNIA, UNSPECIFIED TYPE: ICD-10-CM

## 2023-03-20 RX ORDER — ESZOPICLONE 3 MG/1
3 TABLET, FILM COATED ORAL NIGHTLY PRN
Qty: 30 TABLET | Refills: 3 | Status: SHIPPED | OUTPATIENT
Start: 2023-03-20 | End: 2023-05-08 | Stop reason: SDUPTHER

## 2023-03-20 NOTE — TELEPHONE ENCOUNTER
----- Message from Talita Eaton sent at 3/20/2023 10:03 AM CDT -----  Regarding: Refill Request  .Type:  RX Refill Request    Who Called: pt  Refill or New Rx:refill   RX Name and Strength:eszopiclone (LUNESTA) 3 mg Tab  How is the patient currently taking it? (ex. 1XDay):1X  Is this a 30 day or 90 day RX:30  Preferred Pharmacy with phone number:[facility] [address]  Local or Mail Order:local   Ordering Provider:Lorna Dey   Would the patient rather a call back or a response via [facility]?   Best Call Back Number:[phone]  Additional Information: pt is requesting an refill , she stated she called last week for an refill and haven't heard back from anyone about the refill, explain to pt that Dr Rothman was out of the office last week due to illness, patient is Out of medication please advise

## 2023-05-08 ENCOUNTER — OFFICE VISIT (OUTPATIENT)
Dept: PRIMARY CARE CLINIC | Facility: CLINIC | Age: 78
End: 2023-05-08
Payer: MEDICARE

## 2023-05-08 VITALS
RESPIRATION RATE: 20 BRPM | SYSTOLIC BLOOD PRESSURE: 138 MMHG | HEART RATE: 79 BPM | WEIGHT: 110 LBS | DIASTOLIC BLOOD PRESSURE: 79 MMHG | OXYGEN SATURATION: 98 % | BODY MASS INDEX: 20.24 KG/M2 | HEIGHT: 62 IN | TEMPERATURE: 98 F

## 2023-05-08 DIAGNOSIS — I10 PRIMARY HYPERTENSION: Primary | Chronic | ICD-10-CM

## 2023-05-08 DIAGNOSIS — M81.0 AGE-RELATED OSTEOPOROSIS WITHOUT CURRENT PATHOLOGICAL FRACTURE: ICD-10-CM

## 2023-05-08 DIAGNOSIS — G47.00 INSOMNIA, UNSPECIFIED TYPE: ICD-10-CM

## 2023-05-08 PROCEDURE — 3075F SYST BP GE 130 - 139MM HG: CPT | Mod: CPTII,,, | Performed by: STUDENT IN AN ORGANIZED HEALTH CARE EDUCATION/TRAINING PROGRAM

## 2023-05-08 PROCEDURE — 1126F PR PAIN SEVERITY QUANTIFIED, NO PAIN PRESENT: ICD-10-PCS | Mod: CPTII,,, | Performed by: STUDENT IN AN ORGANIZED HEALTH CARE EDUCATION/TRAINING PROGRAM

## 2023-05-08 PROCEDURE — 1159F MED LIST DOCD IN RCRD: CPT | Mod: CPTII,,, | Performed by: STUDENT IN AN ORGANIZED HEALTH CARE EDUCATION/TRAINING PROGRAM

## 2023-05-08 PROCEDURE — 3078F DIAST BP <80 MM HG: CPT | Mod: CPTII,,, | Performed by: STUDENT IN AN ORGANIZED HEALTH CARE EDUCATION/TRAINING PROGRAM

## 2023-05-08 PROCEDURE — 3078F PR MOST RECENT DIASTOLIC BLOOD PRESSURE < 80 MM HG: ICD-10-PCS | Mod: CPTII,,, | Performed by: STUDENT IN AN ORGANIZED HEALTH CARE EDUCATION/TRAINING PROGRAM

## 2023-05-08 PROCEDURE — 1160F RVW MEDS BY RX/DR IN RCRD: CPT | Mod: CPTII,,, | Performed by: STUDENT IN AN ORGANIZED HEALTH CARE EDUCATION/TRAINING PROGRAM

## 2023-05-08 PROCEDURE — 3288F FALL RISK ASSESSMENT DOCD: CPT | Mod: CPTII,,, | Performed by: STUDENT IN AN ORGANIZED HEALTH CARE EDUCATION/TRAINING PROGRAM

## 2023-05-08 PROCEDURE — 1159F PR MEDICATION LIST DOCUMENTED IN MEDICAL RECORD: ICD-10-PCS | Mod: CPTII,,, | Performed by: STUDENT IN AN ORGANIZED HEALTH CARE EDUCATION/TRAINING PROGRAM

## 2023-05-08 PROCEDURE — 1126F AMNT PAIN NOTED NONE PRSNT: CPT | Mod: CPTII,,, | Performed by: STUDENT IN AN ORGANIZED HEALTH CARE EDUCATION/TRAINING PROGRAM

## 2023-05-08 PROCEDURE — 1101F PR PT FALLS ASSESS DOC 0-1 FALLS W/OUT INJ PAST YR: ICD-10-PCS | Mod: CPTII,,, | Performed by: STUDENT IN AN ORGANIZED HEALTH CARE EDUCATION/TRAINING PROGRAM

## 2023-05-08 PROCEDURE — 99213 PR OFFICE/OUTPT VISIT, EST, LEVL III, 20-29 MIN: ICD-10-PCS | Mod: ,,, | Performed by: STUDENT IN AN ORGANIZED HEALTH CARE EDUCATION/TRAINING PROGRAM

## 2023-05-08 PROCEDURE — 99213 OFFICE O/P EST LOW 20 MIN: CPT | Mod: ,,, | Performed by: STUDENT IN AN ORGANIZED HEALTH CARE EDUCATION/TRAINING PROGRAM

## 2023-05-08 PROCEDURE — 1160F PR REVIEW ALL MEDS BY PRESCRIBER/CLIN PHARMACIST DOCUMENTED: ICD-10-PCS | Mod: CPTII,,, | Performed by: STUDENT IN AN ORGANIZED HEALTH CARE EDUCATION/TRAINING PROGRAM

## 2023-05-08 PROCEDURE — 3288F PR FALLS RISK ASSESSMENT DOCUMENTED: ICD-10-PCS | Mod: CPTII,,, | Performed by: STUDENT IN AN ORGANIZED HEALTH CARE EDUCATION/TRAINING PROGRAM

## 2023-05-08 PROCEDURE — 1101F PT FALLS ASSESS-DOCD LE1/YR: CPT | Mod: CPTII,,, | Performed by: STUDENT IN AN ORGANIZED HEALTH CARE EDUCATION/TRAINING PROGRAM

## 2023-05-08 PROCEDURE — 3075F PR MOST RECENT SYSTOLIC BLOOD PRESS GE 130-139MM HG: ICD-10-PCS | Mod: CPTII,,, | Performed by: STUDENT IN AN ORGANIZED HEALTH CARE EDUCATION/TRAINING PROGRAM

## 2023-05-08 RX ORDER — ESZOPICLONE 3 MG/1
3 TABLET, FILM COATED ORAL NIGHTLY PRN
Qty: 90 TABLET | Refills: 3 | Status: SHIPPED | OUTPATIENT
Start: 2023-05-08 | End: 2023-11-15 | Stop reason: SDUPTHER

## 2023-05-08 NOTE — PROGRESS NOTES
Subjective:       Patient ID: Sherry Heredia is a 77 y.o. female.    Past Medical History:   Age-related nuclear cataract of both eyes  Chronic idiopathic constipation  Claustrophobia  Diverticulitis  Dry eyes  Gastroesophageal reflux disease without esophagitis  [place] (hard of hearing)  Left knee pain  Migraine headache  Mixed hyperlipidemia  Primary hypertension  Renal stones  Right hip pain s/p Right hip replacement 2019  Wears glasses     Chief Complaint: Follow-up    Presents to the clinic for follow up. Overall doing well. Needs refill on her Lunesta. Underwent gyn procedure, tolerated it well. No further issues.     Review of Systems   Constitutional:  Negative for chills and fever.   Respiratory:  Negative for cough.    Cardiovascular:  Negative for chest pain.   Gastrointestinal:  Negative for abdominal pain and vomiting.   Genitourinary:  Negative for dysuria and hematuria.   Neurological:  Negative for syncope and weakness.       Objective:      Physical Exam  Vitals and nursing note reviewed.   Constitutional:       General: She is not in acute distress.     Appearance: Normal appearance. She is not ill-appearing.   Eyes:      General: No scleral icterus.     Extraocular Movements: Extraocular movements intact.      Conjunctiva/sclera: Conjunctivae normal.      Pupils: Pupils are equal, round, and reactive to light.   Cardiovascular:      Rate and Rhythm: Normal rate and regular rhythm.      Pulses: Normal pulses.      Heart sounds: Normal heart sounds. No murmur heard.  Pulmonary:      Effort: Pulmonary effort is normal. No respiratory distress.      Breath sounds: Normal breath sounds. No wheezing.   Abdominal:      General: There is no distension.      Palpations: Abdomen is soft.      Tenderness: There is no abdominal tenderness.   Musculoskeletal:      Right lower leg: No edema.      Left lower leg: No edema.   Skin:     General: Skin is warm.      Coloration: Skin is not jaundiced.   Neurological:       Mental Status: She is alert. Mental status is at baseline.      Gait: Gait normal.   Psychiatric:         Mood and Affect: Mood normal.         Behavior: Behavior normal.       Assessment & Plan:   1. Primary hypertension  Assessment & Plan:  Today's BP WNL  Continue Lisinopril   Counseled on low sodium diet, exercise, tobacco avoidance, and limiting alcohol intake   Pt. Has home BP cuff, instructed to measure home BP and report abnormal values     2. Age-related osteoporosis without current pathological fracture  Overview:  Tried Prolia/Bisphosphonates in the past  Declined further Screening - last DEXA in 2018   Assessment & Plan:  Continue Daily Calcium and Vitamin D supplementation     3. Insomnia, unspecified type  Assessment & Plan:  Stable  Continue Lunesta 3mg as needed for insomnia  Encouraged good sleep hygiene  Orders:  -     eszopiclone (LUNESTA) 3 mg Tab; Take 1 tablet (3 mg total) by mouth nightly as needed (Insomnia).  Dispense: 90 tablet; Refill: 3    Follow up in about 9 months (around 2/8/2024) for Wellness. In addition to their scheduled follow up, the patient has also been instructed to follow up on as needed basis.

## 2023-05-30 ENCOUNTER — TELEPHONE (OUTPATIENT)
Dept: PRIMARY CARE CLINIC | Facility: CLINIC | Age: 78
End: 2023-05-30
Payer: MEDICARE

## 2023-05-30 DIAGNOSIS — Z12.39 ENCOUNTER FOR SCREENING FOR MALIGNANT NEOPLASM OF BREAST, UNSPECIFIED SCREENING MODALITY: Primary | ICD-10-CM

## 2023-05-30 DIAGNOSIS — Z12.31 ENCOUNTER FOR SCREENING MAMMOGRAM FOR MALIGNANT NEOPLASM OF BREAST: ICD-10-CM

## 2023-05-30 NOTE — TELEPHONE ENCOUNTER
----- Message from Yael Dubose sent at 5/30/2023 11:08 AM CDT -----  Regarding: orders  .Type:  Needs Medical Advice    Who Called: Pt  Symptoms (please be specific):    How long has patient had these symptoms:    Pharmacy name and phone #:    Would the patient rather a call back or a response via [facility]? Call back  Best Call Back Number: [phone]  Additional Information: Needs mammo orders, has appt at breast [facility] on 7/12 Fax#:[phone].

## 2023-06-05 PROBLEM — G47.00 INSOMNIA: Chronic | Status: ACTIVE | Noted: 2022-05-23

## 2023-06-05 PROBLEM — G47.00 INSOMNIA: Status: ACTIVE | Noted: 2022-05-23

## 2023-07-12 LAB — BCS RECOMMENDATION EXT: NORMAL

## 2023-07-13 ENCOUNTER — DOCUMENTATION ONLY (OUTPATIENT)
Dept: PRIMARY CARE CLINIC | Facility: CLINIC | Age: 78
End: 2023-07-13
Payer: MEDICARE

## 2023-07-13 LAB — BCS RECOMMENDATION EXT: NORMAL

## 2023-10-06 ENCOUNTER — CLINICAL SUPPORT (OUTPATIENT)
Dept: URGENT CARE | Facility: CLINIC | Age: 78
End: 2023-10-06
Payer: MEDICARE

## 2023-10-06 VITALS — BODY MASS INDEX: 20.24 KG/M2 | HEIGHT: 62 IN | RESPIRATION RATE: 18 BRPM | WEIGHT: 110 LBS

## 2023-10-06 DIAGNOSIS — Z23 NEED FOR INFLUENZA VACCINATION: Primary | ICD-10-CM

## 2023-10-06 PROCEDURE — G0008 FLU VACCINE - QUADRIVALENT - ADJUVANTED: ICD-10-PCS | Mod: ,,, | Performed by: FAMILY MEDICINE

## 2023-10-06 PROCEDURE — 90694 VACC AIIV4 NO PRSRV 0.5ML IM: CPT | Mod: ,,, | Performed by: FAMILY MEDICINE

## 2023-10-06 PROCEDURE — 90694 FLU VACCINE - QUADRIVALENT - ADJUVANTED: ICD-10-PCS | Mod: ,,, | Performed by: FAMILY MEDICINE

## 2023-10-06 PROCEDURE — G0008 ADMIN INFLUENZA VIRUS VAC: HCPCS | Mod: ,,, | Performed by: FAMILY MEDICINE

## 2023-10-06 NOTE — PROGRESS NOTES
Patient presents to clinic for a flu vaccine. Explained necessary information regarding the vaccination to the patient. Patient consented to administration. No adverse reaction post injection. Administered as documented. Instructed to follow up as needed.

## 2023-11-15 ENCOUNTER — OFFICE VISIT (OUTPATIENT)
Dept: PRIMARY CARE CLINIC | Facility: CLINIC | Age: 78
End: 2023-11-15
Payer: MEDICARE

## 2023-11-15 VITALS
HEART RATE: 73 BPM | HEIGHT: 62 IN | SYSTOLIC BLOOD PRESSURE: 110 MMHG | OXYGEN SATURATION: 98 % | TEMPERATURE: 98 F | BODY MASS INDEX: 20.24 KG/M2 | DIASTOLIC BLOOD PRESSURE: 83 MMHG | WEIGHT: 110 LBS | RESPIRATION RATE: 18 BRPM

## 2023-11-15 DIAGNOSIS — J06.9 URI WITH COUGH AND CONGESTION: Primary | ICD-10-CM

## 2023-11-15 DIAGNOSIS — R30.9 URINATION PAIN: ICD-10-CM

## 2023-11-15 DIAGNOSIS — H61.21 IMPACTED CERUMEN OF RIGHT EAR: ICD-10-CM

## 2023-11-15 DIAGNOSIS — H66.002 NON-RECURRENT ACUTE SUPPURATIVE OTITIS MEDIA OF LEFT EAR WITHOUT SPONTANEOUS RUPTURE OF TYMPANIC MEMBRANE: ICD-10-CM

## 2023-11-15 DIAGNOSIS — H60.501 ACUTE OTITIS EXTERNA OF RIGHT EAR, UNSPECIFIED TYPE: ICD-10-CM

## 2023-11-15 DIAGNOSIS — M81.0 AGE-RELATED OSTEOPOROSIS WITHOUT CURRENT PATHOLOGICAL FRACTURE: Chronic | ICD-10-CM

## 2023-11-15 DIAGNOSIS — G47.00 INSOMNIA, UNSPECIFIED TYPE: ICD-10-CM

## 2023-11-15 LAB
APPEARANCE UR: CLEAR
BACTERIA #/AREA URNS AUTO: ABNORMAL /HPF
BILIRUB UR QL STRIP.AUTO: NEGATIVE
COLOR UR AUTO: ABNORMAL
CTP QC/QA: YES
FLUAV AG UPPER RESP QL IA.RAPID: NOT DETECTED
FLUBV AG UPPER RESP QL IA.RAPID: NOT DETECTED
GLUCOSE UR QL STRIP.AUTO: NORMAL
KETONES UR QL STRIP.AUTO: NEGATIVE
LEUKOCYTE ESTERASE UR QL STRIP.AUTO: 75
MOLECULAR STREP A: NEGATIVE
NITRITE UR QL STRIP.AUTO: NEGATIVE
PH UR STRIP.AUTO: 5.5 [PH]
PROT UR QL STRIP.AUTO: NEGATIVE
RBC #/AREA URNS AUTO: ABNORMAL /HPF
RBC UR QL AUTO: NEGATIVE
RSV A 5' UTR RNA NPH QL NAA+PROBE: NOT DETECTED
SARS-COV-2 RNA RESP QL NAA+PROBE: NOT DETECTED
SP GR UR STRIP.AUTO: 1 (ref 1–1.03)
SQUAMOUS #/AREA URNS LPF: ABNORMAL /HPF
UROBILINOGEN UR STRIP-ACNC: NORMAL
WBC #/AREA URNS AUTO: ABNORMAL /HPF

## 2023-11-15 PROCEDURE — 69209 PR REMOVAL IMPACTED CERUMEN USING IRRIGATION/LAVAGE, UNILATERAL: ICD-10-PCS | Mod: RT,,, | Performed by: STUDENT IN AN ORGANIZED HEALTH CARE EDUCATION/TRAINING PROGRAM

## 2023-11-15 PROCEDURE — 1126F PR PAIN SEVERITY QUANTIFIED, NO PAIN PRESENT: ICD-10-PCS | Mod: CPTII,,, | Performed by: STUDENT IN AN ORGANIZED HEALTH CARE EDUCATION/TRAINING PROGRAM

## 2023-11-15 PROCEDURE — 87077 CULTURE AEROBIC IDENTIFY: CPT | Performed by: STUDENT IN AN ORGANIZED HEALTH CARE EDUCATION/TRAINING PROGRAM

## 2023-11-15 PROCEDURE — 87651 POCT STREP A MOLECULAR: ICD-10-PCS | Mod: QW,,, | Performed by: STUDENT IN AN ORGANIZED HEALTH CARE EDUCATION/TRAINING PROGRAM

## 2023-11-15 PROCEDURE — 1160F PR REVIEW ALL MEDS BY PRESCRIBER/CLIN PHARMACIST DOCUMENTED: ICD-10-PCS | Mod: CPTII,,, | Performed by: STUDENT IN AN ORGANIZED HEALTH CARE EDUCATION/TRAINING PROGRAM

## 2023-11-15 PROCEDURE — 81001 URINALYSIS AUTO W/SCOPE: CPT | Performed by: STUDENT IN AN ORGANIZED HEALTH CARE EDUCATION/TRAINING PROGRAM

## 2023-11-15 PROCEDURE — 99214 PR OFFICE/OUTPT VISIT, EST, LEVL IV, 30-39 MIN: ICD-10-PCS | Mod: 25,,, | Performed by: STUDENT IN AN ORGANIZED HEALTH CARE EDUCATION/TRAINING PROGRAM

## 2023-11-15 PROCEDURE — 3288F FALL RISK ASSESSMENT DOCD: CPT | Mod: CPTII,,, | Performed by: STUDENT IN AN ORGANIZED HEALTH CARE EDUCATION/TRAINING PROGRAM

## 2023-11-15 PROCEDURE — 87651 STREP A DNA AMP PROBE: CPT | Mod: QW,,, | Performed by: STUDENT IN AN ORGANIZED HEALTH CARE EDUCATION/TRAINING PROGRAM

## 2023-11-15 PROCEDURE — 1126F AMNT PAIN NOTED NONE PRSNT: CPT | Mod: CPTII,,, | Performed by: STUDENT IN AN ORGANIZED HEALTH CARE EDUCATION/TRAINING PROGRAM

## 2023-11-15 PROCEDURE — 3074F SYST BP LT 130 MM HG: CPT | Mod: CPTII,,, | Performed by: STUDENT IN AN ORGANIZED HEALTH CARE EDUCATION/TRAINING PROGRAM

## 2023-11-15 PROCEDURE — 69209 REMOVE IMPACTED EAR WAX UNI: CPT | Mod: RT,,, | Performed by: STUDENT IN AN ORGANIZED HEALTH CARE EDUCATION/TRAINING PROGRAM

## 2023-11-15 PROCEDURE — 1160F RVW MEDS BY RX/DR IN RCRD: CPT | Mod: CPTII,,, | Performed by: STUDENT IN AN ORGANIZED HEALTH CARE EDUCATION/TRAINING PROGRAM

## 2023-11-15 PROCEDURE — 1159F PR MEDICATION LIST DOCUMENTED IN MEDICAL RECORD: ICD-10-PCS | Mod: CPTII,,, | Performed by: STUDENT IN AN ORGANIZED HEALTH CARE EDUCATION/TRAINING PROGRAM

## 2023-11-15 PROCEDURE — 3074F PR MOST RECENT SYSTOLIC BLOOD PRESSURE < 130 MM HG: ICD-10-PCS | Mod: CPTII,,, | Performed by: STUDENT IN AN ORGANIZED HEALTH CARE EDUCATION/TRAINING PROGRAM

## 2023-11-15 PROCEDURE — 3079F PR MOST RECENT DIASTOLIC BLOOD PRESSURE 80-89 MM HG: ICD-10-PCS | Mod: CPTII,,, | Performed by: STUDENT IN AN ORGANIZED HEALTH CARE EDUCATION/TRAINING PROGRAM

## 2023-11-15 PROCEDURE — 1101F PR PT FALLS ASSESS DOC 0-1 FALLS W/OUT INJ PAST YR: ICD-10-PCS | Mod: CPTII,,, | Performed by: STUDENT IN AN ORGANIZED HEALTH CARE EDUCATION/TRAINING PROGRAM

## 2023-11-15 PROCEDURE — 1159F MED LIST DOCD IN RCRD: CPT | Mod: CPTII,,, | Performed by: STUDENT IN AN ORGANIZED HEALTH CARE EDUCATION/TRAINING PROGRAM

## 2023-11-15 PROCEDURE — 99214 OFFICE O/P EST MOD 30 MIN: CPT | Mod: 25,,, | Performed by: STUDENT IN AN ORGANIZED HEALTH CARE EDUCATION/TRAINING PROGRAM

## 2023-11-15 PROCEDURE — 87086 URINE CULTURE/COLONY COUNT: CPT | Performed by: STUDENT IN AN ORGANIZED HEALTH CARE EDUCATION/TRAINING PROGRAM

## 2023-11-15 PROCEDURE — 3079F DIAST BP 80-89 MM HG: CPT | Mod: CPTII,,, | Performed by: STUDENT IN AN ORGANIZED HEALTH CARE EDUCATION/TRAINING PROGRAM

## 2023-11-15 PROCEDURE — 1101F PT FALLS ASSESS-DOCD LE1/YR: CPT | Mod: CPTII,,, | Performed by: STUDENT IN AN ORGANIZED HEALTH CARE EDUCATION/TRAINING PROGRAM

## 2023-11-15 PROCEDURE — 3288F PR FALLS RISK ASSESSMENT DOCUMENTED: ICD-10-PCS | Mod: CPTII,,, | Performed by: STUDENT IN AN ORGANIZED HEALTH CARE EDUCATION/TRAINING PROGRAM

## 2023-11-15 PROCEDURE — 0241U COVID/RSV/FLU A&B PCR: CPT | Performed by: STUDENT IN AN ORGANIZED HEALTH CARE EDUCATION/TRAINING PROGRAM

## 2023-11-15 RX ORDER — CETIRIZINE HYDROCHLORIDE 10 MG/1
10 TABLET ORAL DAILY
Qty: 30 TABLET | Refills: 2 | Status: SHIPPED | OUTPATIENT
Start: 2023-11-15

## 2023-11-15 RX ORDER — HYDROXYZINE PAMOATE 25 MG/1
25 CAPSULE ORAL NIGHTLY PRN
Qty: 30 CAPSULE | Refills: 2 | Status: SHIPPED | OUTPATIENT
Start: 2023-11-15

## 2023-11-15 RX ORDER — PROMETHAZINE HYDROCHLORIDE AND DEXTROMETHORPHAN HYDROBROMIDE 6.25; 15 MG/5ML; MG/5ML
5 SYRUP ORAL EVERY 6 HOURS PRN
Qty: 473 ML | Refills: 0 | Status: SHIPPED | OUTPATIENT
Start: 2023-11-15

## 2023-11-15 RX ORDER — MONTELUKAST SODIUM 10 MG/1
10 TABLET ORAL NIGHTLY
Qty: 30 TABLET | Refills: 2 | Status: SHIPPED | OUTPATIENT
Start: 2023-11-15

## 2023-11-15 RX ORDER — DOXYCYCLINE 100 MG/1
100 CAPSULE ORAL EVERY 12 HOURS
Qty: 14 CAPSULE | Refills: 0 | Status: SHIPPED | OUTPATIENT
Start: 2023-11-15 | End: 2023-11-22

## 2023-11-15 RX ORDER — HYDROCORTISONE AND ACETIC ACID 20.75; 10.375 MG/ML; MG/ML
4 SOLUTION AURICULAR (OTIC) 2 TIMES DAILY
Qty: 10 ML | Refills: 0 | Status: SHIPPED | OUTPATIENT
Start: 2023-11-15 | End: 2023-11-25

## 2023-11-15 RX ORDER — ESZOPICLONE 3 MG/1
3 TABLET, FILM COATED ORAL NIGHTLY PRN
Qty: 90 TABLET | Refills: 3 | Status: SHIPPED | OUTPATIENT
Start: 2023-11-15

## 2023-11-15 NOTE — ASSESSMENT & PLAN NOTE
Stable  Continue Lunesta 3mg as needed for insomnia, refill 90 day supply  Encouraged good sleep hygiene

## 2023-11-15 NOTE — PROGRESS NOTES
Subjective:       Patient ID: Sherry Heredia is a 78 y.o. female.    Past Medical History:   Age-related nuclear cataract of both eyes  Chronic idiopathic constipation  Claustrophobia  Diverticulitis  Dry eyes  Gastroesophageal reflux disease without esophagitis  [place] (hard of hearing)  Left knee pain  Migraine headache  Mixed hyperlipidemia  Primary hypertension  Renal stones  Right hip pain s/p Right hip replacement 2019  Wears glasses     Chief Complaint: cough, otalgia, urinary pressure    Presents to the clinic for follow-up.  Endorsing flank pain, as well as urinary pressure.  Feels like she has a UTI.  She also has been suffering with ear pain, nasal congestion, sinus pressure as well as a cough.  Not much relief with over-the-counter medications.  Patient has also noticed that her hearing has decreased bilaterally.      Review of Systems   Constitutional:  Negative for chills and fever.   HENT:  Positive for nasal congestion, ear pain, hearing loss and sinus pressure/congestion.    Respiratory:  Positive for cough. Negative for shortness of breath.    Cardiovascular:  Negative for chest pain.   Gastrointestinal:  Negative for abdominal pain and vomiting.   Genitourinary:  Positive for flank pain. Negative for dysuria and hematuria.        Urinary pressure         Objective:      Physical Exam  Vitals and nursing note reviewed.   Constitutional:       General: She is not in acute distress.     Appearance: Normal appearance. She is not ill-appearing.   HENT:      Head: Normocephalic.      Right Ear: Decreased hearing noted. There is impacted cerumen.      Left Ear: Ear canal normal. A middle ear effusion is present. Tympanic membrane is erythematous.      Ears:      Comments: Raw/erythematous R ear canal after irrigation of ear wax removal       Nose: Congestion and rhinorrhea present. Rhinorrhea is clear.      Right Turbinates: Enlarged and swollen.      Left Turbinates: Enlarged and swollen.      Right Sinus:  No maxillary sinus tenderness or frontal sinus tenderness.      Left Sinus: No maxillary sinus tenderness or frontal sinus tenderness.      Mouth/Throat:      Mouth: Mucous membranes are moist.      Pharynx: Posterior oropharyngeal erythema present. No oropharyngeal exudate.   Eyes:      General: No scleral icterus.     Conjunctiva/sclera: Conjunctivae normal.   Cardiovascular:      Rate and Rhythm: Normal rate and regular rhythm.   Pulmonary:      Effort: Pulmonary effort is normal. No respiratory distress.   Skin:     General: Skin is warm and dry.      Coloration: Skin is not jaundiced.   Neurological:      Mental Status: She is alert and oriented to person, place, and time. Mental status is at baseline.      Cranial Nerves: No cranial nerve deficit.      Gait: Gait normal.   Psychiatric:         Mood and Affect: Mood normal.         Behavior: Behavior normal.         Assessment & Plan:   1. URI with cough and congestion  -     COVID/RSV/FLU A&B PCR; Future; Expected date: 11/15/2023  -     POCT Strep A, Molecular  -     doxycycline (VIBRAMYCIN) 100 MG Cap; Take 1 capsule (100 mg total) by mouth every 12 (twelve) hours. for 7 days  Dispense: 14 capsule; Refill: 0  -     montelukast (SINGULAIR) 10 mg tablet; Take 1 tablet (10 mg total) by mouth every evening.  Dispense: 30 tablet; Refill: 2  -     hydrOXYzine pamoate (VISTARIL) 25 MG Cap; Take 1 capsule (25 mg total) by mouth nightly as needed (nasal congestion).  Dispense: 30 capsule; Refill: 2  -     cetirizine (ZYRTEC) 10 MG tablet; Take 1 tablet (10 mg total) by mouth once daily.  Dispense: 30 tablet; Refill: 2  -     promethazine-dextromethorphan (PROMETHAZINE-DM) 6.25-15 mg/5 mL Syrp; Take 5 mLs by mouth every 6 (six) hours as needed (cough).  Dispense: 473 mL; Refill: 0    2. Non-recurrent acute suppurative otitis media of left ear without spontaneous rupture of tympanic membrane  -     doxycycline (VIBRAMYCIN) 100 MG Cap; Take 1 capsule (100 mg total) by  mouth every 12 (twelve) hours. for 7 days  Dispense: 14 capsule; Refill: 0  -     montelukast (SINGULAIR) 10 mg tablet; Take 1 tablet (10 mg total) by mouth every evening.  Dispense: 30 tablet; Refill: 2  -     hydrOXYzine pamoate (VISTARIL) 25 MG Cap; Take 1 capsule (25 mg total) by mouth nightly as needed (nasal congestion).  Dispense: 30 capsule; Refill: 2  -     cetirizine (ZYRTEC) 10 MG tablet; Take 1 tablet (10 mg total) by mouth once daily.  Dispense: 30 tablet; Refill: 2    3. Urination pain  Comments:  Obtain Urinalysis and Urine Culture   Given both sinus/urinary issues will treat dually with Doxycycline   Encouraged good PO hydration  Orders:  -     Urinalysis  -     Urine Culture High Risk  -     doxycycline (VIBRAMYCIN) 100 MG Cap; Take 1 capsule (100 mg total) by mouth every 12 (twelve) hours. for 7 days  Dispense: 14 capsule; Refill: 0    4. Impacted cerumen of right ear  -     Ear wax removal  -     carbamide peroxide (DEBROX) 6.5 % otic solution; Place 5 drops into the right ear 2 (two) times daily as needed (ear wax).  Dispense: 15 mL; Refill: 2    5. Acute otitis externa of right ear, unspecified type  -     acetic acid-hydrocortisone (VOSOL-HC) otic solution; Place 4 drops into the right ear 2 (two) times daily. for 10 days  Dispense: 10 mL; Refill: 0    6. Insomnia, unspecified type  Assessment & Plan:  Stable  Continue Lunesta 3mg as needed for insomnia, refill 90 day supply  Encouraged good sleep hygiene  Orders:  -     eszopiclone (LUNESTA) 3 mg Tab; Take 1 tablet (3 mg total) by mouth nightly as needed (Insomnia).  Dispense: 90 tablet; Refill: 3    7. Age-related osteoporosis without current pathological fracture  Overview:  Tried Bisphosphonates in the past  Declined further Screening - last DEXA in 2018   Assessment & Plan:  Continue Prolia Injections  Continue Daily Calcium and Vitamin D supplementation   Orders:  -     denosumab (PROLIA) 60 mg/mL Syrg; Inject 1 mL (60 mg total) into the  skin every 6 (six) months.  Dispense: 2 mL; Refill: 0    Follow up if symptoms worsen or fail to improve. In addition to their scheduled follow up, the patient has also been instructed to follow up on as needed basis.

## 2023-11-15 NOTE — PROGRESS NOTES
COVID, Flu and RSV were negative. No changes to treatment plan discussed at today's visit.     Homa Dey MD

## 2023-11-17 LAB — BACTERIA UR CULT: ABNORMAL

## 2023-11-17 NOTE — PROGRESS NOTES
Please let the patient know that her urine culture grew E. Coli, sensitive to the antibiotics (doxycycline) that was prescribed yesterday. Complete as prescribed.     Homa Dey MD

## 2023-12-07 DIAGNOSIS — M81.0 AGE-RELATED OSTEOPOROSIS WITHOUT CURRENT PATHOLOGICAL FRACTURE: Chronic | ICD-10-CM

## 2024-04-23 ENCOUNTER — HOSPITAL ENCOUNTER (OUTPATIENT)
Dept: RADIOLOGY | Facility: CLINIC | Age: 79
Discharge: HOME | End: 2024-04-23
Attending: ORTHOPAEDIC SURGERY
Payer: MEDICARE

## 2024-04-23 ENCOUNTER — OFFICE VISIT (OUTPATIENT)
Dept: ORTHOPEDICS | Facility: CLINIC | Age: 79
End: 2024-04-23
Payer: MEDICARE

## 2024-04-23 VITALS
SYSTOLIC BLOOD PRESSURE: 137 MMHG | HEART RATE: 81 BPM | DIASTOLIC BLOOD PRESSURE: 87 MMHG | HEIGHT: 62 IN | BODY MASS INDEX: 20.48 KG/M2 | WEIGHT: 111.31 LBS

## 2024-04-23 DIAGNOSIS — M25.562 ACUTE PAIN OF LEFT KNEE: Primary | ICD-10-CM

## 2024-04-23 DIAGNOSIS — M25.562 ACUTE PAIN OF LEFT KNEE: ICD-10-CM

## 2024-04-23 PROCEDURE — 3288F FALL RISK ASSESSMENT DOCD: CPT | Mod: CPTII,,, | Performed by: ORTHOPAEDIC SURGERY

## 2024-04-23 PROCEDURE — 1101F PT FALLS ASSESS-DOCD LE1/YR: CPT | Mod: CPTII,,, | Performed by: ORTHOPAEDIC SURGERY

## 2024-04-23 PROCEDURE — 1159F MED LIST DOCD IN RCRD: CPT | Mod: CPTII,,, | Performed by: ORTHOPAEDIC SURGERY

## 2024-04-23 PROCEDURE — 99214 OFFICE O/P EST MOD 30 MIN: CPT | Mod: ,,, | Performed by: ORTHOPAEDIC SURGERY

## 2024-04-23 PROCEDURE — 3075F SYST BP GE 130 - 139MM HG: CPT | Mod: CPTII,,, | Performed by: ORTHOPAEDIC SURGERY

## 2024-04-23 PROCEDURE — 3079F DIAST BP 80-89 MM HG: CPT | Mod: CPTII,,, | Performed by: ORTHOPAEDIC SURGERY

## 2024-04-23 PROCEDURE — 73502 X-RAY EXAM HIP UNI 2-3 VIEWS: CPT | Mod: LT,,, | Performed by: ORTHOPAEDIC SURGERY

## 2024-04-23 NOTE — PROGRESS NOTES
"  Chief Complaint:   Chief Complaint   Patient presents with    Left Hip - Pain     Lt hip - ongoing pain for several months. Reports pain travels in back at times. States at times feels as though it gets stuck and hurts more when sitting down for awhile. Is taking Aleve when needed but does not completely relieve the pain.         History of present illness:  78-year-old male presents today for follow-up left hip pain.  Patient has a long history pain left hip.  She pain in her groin worse when he is out of a low chair.  Patient also with significant lateral hip pain that began after working in her yd.  She denies any conservative management thus far other than some Aleve.    Past Medical History:   Diagnosis Date    Age-related nuclear cataract of both eyes 5/23/2022    Chronic idiopathic constipation 5/23/2022    Claustrophobia     " close doors"    Diverticulitis     hx    Dry eyes     Gastroesophageal reflux disease without esophagitis 5/23/2022    [place] (hard of hearing)     Left knee pain     Dr Godwin    Migraine headache 5/23/2022    inpast    Mixed hyperlipidemia 5/23/2022    ASCVD Score = 19.8%     Primary hypertension 5/23/2022    Renal stones     hx renal stones    Right hip pain     Right hip replacement 2019    Wears glasses        Past Surgical History:   Procedure Laterality Date    CHOLECYSTECTOMY      COLONOSCOPY      Dr Love 10/2021    DENTAL SURGERY      HYSTERECTOMY  03/02/2023    JOINT REPLACEMENT      mammogram 07/2021      [facility]    [facility]      2019       Current Outpatient Medications   Medication Sig Dispense Refill    artificial tears ointment (REFRESH P.M.) Oint every evening. Refresh ointment in right eye @ night      calcium carbonate/vitamin D3 (CALTRATE 600 + D ORAL) Take 1 tablet by mouth once daily at 6am.      eszopiclone (LUNESTA) 3 mg Tab Take 1 tablet (3 mg total) by mouth nightly as needed (Insomnia). 90 tablet 3    ezetimibe (ZETIA) 10 mg tablet Take 1 tablet " (10 mg total) by mouth once daily. 90 tablet 3    Lactobacillus acidophilus (PROBIOTIC ACIDOPHILUS ORAL) Take by mouth. Jose-biotic 1 daily      lisinopriL 10 MG tablet Take 1 tablet (10 mg total) by mouth once daily. 90 tablet 3    omeprazole (PRILOSEC) 40 MG capsule Take 1 capsule (40 mg total) by mouth once daily. 90 capsule 3    RESTASIS 0.05 % ophthalmic emulsion Place 1 drop into both eyes 2 (two) times daily.      valACYclovir (VALTREX) 1000 MG tablet See Instructions, TAKE TWO TABLETS TWICE DAILY FOR 2 DOSES PRN FOR FEVER BLISTERS, # 10 tab(s), 3 Refill(s), Pharmacy: [facility], 157, cm, Height/Length Dosing, 01/27/22 8:56:00 CST, 51.95, kg, Weight Dosing, 01/27/22 8:56:00 CST 20 tablet 3    acyclovir 5% (ZOVIRAX) 5 % ointment Apply topically 4 (four) times daily as needed (fever blister). (Patient not taking: Reported on 4/23/2024) 30 g 2    carbamide peroxide (DEBROX) 6.5 % otic solution Place 5 drops into the right ear 2 (two) times daily as needed (ear wax). 15 mL 2    cetirizine (ZYRTEC) 10 MG tablet Take 1 tablet (10 mg total) by mouth once daily. (Patient not taking: Reported on 4/23/2024) 30 tablet 2    conjugated estrogens (PREMARIN) vaginal cream Place 0.5 g vaginally once daily for 14 days, THEN 0.5 g twice a week. 2 applicator 0    denosumab (PROLIA) 60 mg/mL Syrg Inject 1 mL (60 mg total) into the skin every 6 (six) months. (Patient not taking: Reported on 4/23/2024) 2 mL 0    erythromycin (ROMYCIN) ophthalmic ointment SMARTSIG:Sparingly In Eye(s) Every Night (Patient not taking: Reported on 4/23/2024)      hydrocortisone 1 % cream Apply topically 2 (two) times daily as needed (fissures, hemorrhoids). (Patient not taking: Reported on 4/23/2024) 30 g 2    hydrOXYzine pamoate (VISTARIL) 25 MG Cap Take 1 capsule (25 mg total) by mouth nightly as needed (nasal congestion). (Patient not taking: Reported on 4/23/2024) 30 capsule 2    montelukast (SINGULAIR) 10 mg tablet Take 1 tablet (10 mg  total) by mouth every evening. 30 tablet 2    moxifloxacin (VIGAMOX) 0.5 % ophthalmic solution Place 1 drop into the right eye 4 (four) times daily.      multivitamin/iron/folic acid (CENTRUM ORAL) Take by mouth. By mouth daily      mv,Ca,min-iron-FA-lycopene 8 mg iron- 200 mcg-600 mcg Tab Take by mouth.      promethazine-dextromethorphan (PROMETHAZINE-DM) 6.25-15 mg/5 mL Syrp Take 5 mLs by mouth every 6 (six) hours as needed (cough). 473 mL 0     No current facility-administered medications for this visit.       Review of patient's allergies indicates:   Allergen Reactions    Penicillin g benzathine      Other reaction(s): Unknown    Statins-hmg-coa reductase inhibitors Other (See Comments)     Muscle Aches      Sulfamethoxazole      Other reaction(s): itcing, red splotches    Codeine Nausea And Vomiting       Family History   Problem Relation Name Age of Onset    Heart disease Mother      Heart disease Father         Social History     Socioeconomic History    Marital status: Other   Tobacco Use    Smoking status: Never     Passive exposure: Never    Smokeless tobacco: Never   Substance and Sexual Activity    Alcohol use: Yes     Comment: 1-2 glasses of wine per year    Drug use: Never    Sexual activity: Yes     Partners: Male     Comment:    Social History Narrative    ** Merged History Encounter **         ** Merged History Encounter **          Social Determinants of Health     Financial Resource Strain: Low Risk  (3/25/2024)    Received from [facility] and Its Subsidiaries and Affiliates    Overall Financial Resource Strain (CARDIA)     Difficulty of Paying Living Expenses: Not hard at all   Food Insecurity: No Food Insecurity (3/25/2024)    Received from [facility] and Its Subsidiaries and Affiliates    Hunger Vital Sign     Worried About Running Out of Food in the Last Year: Never true     Ran Out of Food in the Last Year:  Never true   Transportation Needs: No Transportation Needs (3/25/2024)    [facility]    PRAPARE - Transportation     Lack of Transportation (Medical): No     Lack of Transportation (Non-Medical): No   Physical Activity: Sufficiently Active (3/25/2024)    [facility]    Exercise Vital Sign     Days of Exercise per Week: 7 days     Minutes of Exercise per Session: 150+ min   Stress: No Stress Concern Present (3/25/2024)    [facility]    [place] [employer] - Occupational Stress Questionnaire     Feeling of Stress : Not at all   Social Connections: Unknown (3/25/2024)    Received from [facility] and Its [facility] and Affiliates    Social Connection and Isolation Panel [NHANES]     Frequency of Communication with Friends and Family: Once a week     Frequency of Social Gatherings with Friends and Family: Never     Attends Taoist Services: Patient unable to answer     Active Member of Clubs or Organizations: Patient declined     Attends Club or Organization Meetings: Patient unable to answer     Marital Status:    Housing Stability: Low Risk  (5/8/2023)    Housing Stability Vital Sign     Unable to Pay for Housing in the Last Year: No     Number of Places Lived in the Last Year: 1     Unstable Housing in the Last Year: No           Review of Systems:    Constitution: Negative for chills, fever, and sweats.  Negative for unexplained weight loss.    HENT:  Negative for headaches and blurry vision.    Cardiovascular:Negative for chest pain or irregular heart beat. Negative for hypertension.    Respiratory:  Negative for cough and shortness of breath.    Gastrointestinal: Negative for  abdominal pain, heartburn, melena, nausea, and vomitting.    Genitourinary:  Negative bladder incontinence and dysuria.    Musculoskeletal:  See HPI    Neurological: Negative for numbness.    Psychiatric/Behavioral: Negative for depression.  The patient is not nervous/anxious.      Endocrine: Negative for polyuria    Hematologic/Lymphatic: Negative for bleeding problem.  Does not bruise/bleed easily.    Skin: Negative for poor would healing and rash      Physical Examination:    Vital Signs:    Vitals:    04/23/24 1302   BP: 137/87   Pulse: 81       Body mass index is 20.36 kg/m².    General: No acute distress, alert and oriented, healthy appearing    HEENT: Head is atraumatic, mucous membranes are moist    Neck: Supples, no JVD    Cardiovascular: Palpable dorsalis pedis and posterior tibial pulses, regular rate and rhythm to those pulses    Lungs: Breathing non-labored    Skin: no rashes appreciated    Neurologic: Can flex and extend knees, ankles, and toes. Sensation is grossly intact    Left hip:  Range of motion of the left hip with groin pain.  Patient has point tenderness laterally over greater trochanter.    X-rays:  Three views left hip reviewed with the patient with end-stage osteoarthritis of the left hip joint.  She has bone-on-bone articulation periarticular osteophytes     Assessment::  Left hip osteoarthritis  Trochanteric bursitis    Plan:  Discussed all treatment with the patient.  Patient was trochanteric bursitis likely response to her underlying osteoarthritis.  We discussed giving her an injection.  She has not wish to proceed with this.  She was rather start with a relieve.  She is going to try this with the next 4-6 weeks.  We will see her back after this has been performed see if she needs an injection at that point.    This note was created using [employer] voice recognition software that occasionally misinterpreted phrases or words.    Consult note is delivered via Epic messaging service.

## 2024-05-07 ENCOUNTER — OFFICE VISIT (OUTPATIENT)
Dept: ORTHOPEDICS | Facility: CLINIC | Age: 79
End: 2024-05-07
Payer: MEDICARE

## 2024-05-07 VITALS
HEIGHT: 62 IN | DIASTOLIC BLOOD PRESSURE: 89 MMHG | BODY MASS INDEX: 21.42 KG/M2 | WEIGHT: 116.38 LBS | SYSTOLIC BLOOD PRESSURE: 145 MMHG | HEART RATE: 84 BPM

## 2024-05-07 DIAGNOSIS — M70.62 TROCHANTERIC BURSITIS, LEFT HIP: ICD-10-CM

## 2024-05-07 DIAGNOSIS — M16.12 PRIMARY OSTEOARTHRITIS OF LEFT HIP: Primary | ICD-10-CM

## 2024-05-07 PROCEDURE — 99214 OFFICE O/P EST MOD 30 MIN: CPT | Mod: 25,,, | Performed by: ORTHOPAEDIC SURGERY

## 2024-05-07 PROCEDURE — 1101F PT FALLS ASSESS-DOCD LE1/YR: CPT | Mod: CPTII,,, | Performed by: ORTHOPAEDIC SURGERY

## 2024-05-07 PROCEDURE — 3079F DIAST BP 80-89 MM HG: CPT | Mod: CPTII,,, | Performed by: ORTHOPAEDIC SURGERY

## 2024-05-07 PROCEDURE — 1159F MED LIST DOCD IN RCRD: CPT | Mod: CPTII,,, | Performed by: ORTHOPAEDIC SURGERY

## 2024-05-07 PROCEDURE — 3077F SYST BP >= 140 MM HG: CPT | Mod: CPTII,,, | Performed by: ORTHOPAEDIC SURGERY

## 2024-05-07 PROCEDURE — 20610 DRAIN/INJ JOINT/BURSA W/O US: CPT | Mod: LT,,, | Performed by: NURSE PRACTITIONER

## 2024-05-07 PROCEDURE — 3288F FALL RISK ASSESSMENT DOCD: CPT | Mod: CPTII,,, | Performed by: ORTHOPAEDIC SURGERY

## 2024-05-07 RX ORDER — BETAMETHASONE SODIUM PHOSPHATE AND BETAMETHASONE ACETATE 3; 3 MG/ML; MG/ML
12 INJECTION, SUSPENSION INTRA-ARTICULAR; INTRALESIONAL; INTRAMUSCULAR; SOFT TISSUE
Status: DISCONTINUED | OUTPATIENT
Start: 2024-05-07 | End: 2024-05-07 | Stop reason: HOSPADM

## 2024-05-07 RX ORDER — LIDOCAINE HYDROCHLORIDE 20 MG/ML
5 INJECTION, SOLUTION EPIDURAL; INFILTRATION; INTRACAUDAL; PERINEURAL
Status: DISCONTINUED | OUTPATIENT
Start: 2024-05-07 | End: 2024-05-07 | Stop reason: HOSPADM

## 2024-05-07 RX ADMIN — BETAMETHASONE SODIUM PHOSPHATE AND BETAMETHASONE ACETATE 12 MG: 3; 3 INJECTION, SUSPENSION INTRA-ARTICULAR; INTRALESIONAL; INTRAMUSCULAR; SOFT TISSUE at 09:05

## 2024-05-07 RX ADMIN — LIDOCAINE HYDROCHLORIDE 5 ML: 20 INJECTION, SOLUTION EPIDURAL; INFILTRATION; INTRACAUDAL; PERINEURAL at 09:05

## 2024-05-07 NOTE — PROGRESS NOTES
"  Chief Complaint:   Chief Complaint   Patient presents with    Left Hip - Pain     Lt hip pain, states pain has been the same since last visit, states still able to do ADLs, states with walking will find more relief, states feels like there is something else is going on,        History of present illness:    History of Present Illness  The patient is a 78-year-old female who presents for evaluation of left hip pain. She is accompanied by an adult male.    The patient continues to experience severe pain in her left hip, which prevents her from lifting it. She suspects an underlying issue, as evidenced by an inability to walk when her left hip was previously problematic. Despite the pain, she maintains the ability to perform tasks such as shoveling and pulling bricks. An x-ray conducted a week prior revealed potential muscle tears or muscle strains. The onset of her hip pain was approximately a month ago, coinciding with a chainsaw accident. She has not previously received an injection in the bursa sac. The most effective treatment for her pain is an ice pack and Aleve.    Past Medical History:   Diagnosis Date    Age-related nuclear cataract of both eyes 5/23/2022    Chronic idiopathic constipation 5/23/2022    Claustrophobia     " close doors"    Diverticulitis     hx    Dry eyes     Gastroesophageal reflux disease without esophagitis 5/23/2022    [place] (hard of hearing)     Left knee pain     Dr Godwin    Migraine headache 5/23/2022    inpast    Mixed hyperlipidemia 5/23/2022    ASCVD Score = 19.8%     Primary hypertension 5/23/2022    Renal stones     hx renal stones    Right hip pain     Right hip replacement 2019    Wears glasses        Past Surgical History:   Procedure Laterality Date    CHOLECYSTECTOMY      COLONOSCOPY      Dr Love 10/2021    DENTAL SURGERY      HYSTERECTOMY  03/02/2023    JOINT REPLACEMENT      mammogram 07/2021      [facility]    TRHR      2019       Current Outpatient " Medications   Medication Sig    artificial tears ointment (REFRESH P.M.) Oint every evening. Refresh ointment in right eye @ night    calcium carbonate/vitamin D3 (CALTRATE 600 + D ORAL) Take 1 tablet by mouth once daily at 6am.    carbamide peroxide (DEBROX) 6.5 % otic solution Place 5 drops into the right ear 2 (two) times daily as needed (ear wax).    eszopiclone (LUNESTA) 3 mg Tab Take 1 tablet (3 mg total) by mouth nightly as needed (Insomnia).    ezetimibe (ZETIA) 10 mg tablet Take 1 tablet (10 mg total) by mouth once daily.    Lactobacillus acidophilus (PROBIOTIC ACIDOPHILUS ORAL) Take by mouth. Jose-biotic 1 daily    lisinopriL 10 MG tablet Take 1 tablet (10 mg total) by mouth once daily.    montelukast (SINGULAIR) 10 mg tablet Take 1 tablet (10 mg total) by mouth every evening.    moxifloxacin (VIGAMOX) 0.5 % ophthalmic solution Place 1 drop into the right eye 4 (four) times daily.    multivitamin/iron/folic acid (CENTRUM ORAL) Take by mouth. By mouth daily    mv,Ca,min-iron-FA-lycopene 8 mg iron- 200 mcg-600 mcg Tab Take by mouth.    omeprazole (PRILOSEC) 40 MG capsule Take 1 capsule (40 mg total) by mouth once daily.    promethazine-dextromethorphan (PROMETHAZINE-DM) 6.25-15 mg/5 mL Syrp Take 5 mLs by mouth every 6 (six) hours as needed (cough).    RESTASIS 0.05 % ophthalmic emulsion Place 1 drop into both eyes 2 (two) times daily.    valACYclovir (VALTREX) 1000 MG tablet See Instructions, TAKE TWO TABLETS TWICE DAILY FOR 2 DOSES PRN FOR FEVER BLISTERS, # 10 tab(s), 3 Refill(s), Pharmacy: [facility] [facility], 157, cm, Height/Length Dosing, 01/27/22 8:56:00 CST, 51.95, kg, Weight Dosing, 01/27/22 8:56:00 CST    acyclovir 5% (ZOVIRAX) 5 % ointment Apply topically 4 (four) times daily as needed (fever blister). (Patient not taking: Reported on 4/23/2024)    cetirizine (ZYRTEC) 10 MG tablet Take 1 tablet (10 mg total) by mouth once daily. (Patient not taking: Reported on 4/23/2024)    conjugated estrogens  (PREMARIN) vaginal cream Place 0.5 g vaginally once daily for 14 days, THEN 0.5 g twice a week.    denosumab (PROLIA) 60 mg/mL Syrg Inject 1 mL (60 mg total) into the skin every 6 (six) months. (Patient not taking: Reported on 4/23/2024)    erythromycin (ROMYCIN) ophthalmic ointment SMARTSIG:Sparingly In Eye(s) Every Night (Patient not taking: Reported on 4/23/2024)    hydrocortisone 1 % cream Apply topically 2 (two) times daily as needed (fissures, hemorrhoids). (Patient not taking: Reported on 4/23/2024)    hydrOXYzine pamoate (VISTARIL) 25 MG Cap Take 1 capsule (25 mg total) by mouth nightly as needed (nasal congestion). (Patient not taking: Reported on 4/23/2024)     No current facility-administered medications for this visit.       Review of patient's allergies indicates:   Allergen Reactions    Penicillin g benzathine      Other reaction(s): Unknown    Statins-hmg-coa reductase inhibitors Other (See Comments)     Muscle Aches      Sulfamethoxazole      Other reaction(s): itcing, red splotches    Codeine Nausea And Vomiting       Family History   Problem Relation Name Age of Onset    Heart disease Mother      Heart disease Father         Social History     Socioeconomic History    Marital status: Other   Tobacco Use    Smoking status: Never     Passive exposure: Never    Smokeless tobacco: Never   Substance and Sexual Activity    Alcohol use: Yes     Comment: 1-2 glasses of wine per year    Drug use: Never    Sexual activity: Yes     Partners: Male     Comment:    Social History Narrative    ** Merged History Encounter **         ** Merged History Encounter **          Social Determinants of Health     Financial Resource Strain: Low Risk  (3/25/2024)    Received from [facility] and Its Subsidiaries and Affiliates    Overall Financial Resource Strain (CARDIA)     Difficulty of Paying Living Expenses: Not hard at all   Food Insecurity: No Food Insecurity (3/25/2024)     [facility]    Hunger Vital Sign     Worried About Running Out of Food in the Last Year: Never true     Ran Out of Food in the Last Year: Never true   Transportation Needs: No Transportation Needs (3/25/2024)    [facility]    PRAPARE - Transportation     Lack of Transportation (Medical): No     Lack of Transportation (Non-Medical): No   Physical Activity: Sufficiently Active (3/25/2024)    [facility]    Exercise Vital Sign     Days of Exercise per Week: 7 days     Minutes of Exercise per Session: 150+ min   Stress: No Stress Concern Present (3/25/2024)    [facility]    [employer] - Occupational Stress Questionnaire     Feeling of Stress : Not at all   Housing Stability: Low Risk  (5/8/2023)    Housing Stability Vital Sign     Unable to Pay for Housing in the Last Year: No     Number of Places Lived in the Last Year: 1     Unstable Housing in the Last Year: No           Review of Systems:    Constitution: Negative for chills, fever, and sweats.  Negative for unexplained weight loss.    HENT:  Negative for headaches and blurry vision.    Cardiovascular:Negative for chest pain or irregular heart beat. Negative for hypertension.    Respiratory:  Negative for cough and shortness of breath.    Gastrointestinal: Negative for abdominal pain, heartburn, melena, nausea, and vomitting.    Genitourinary:  Negative bladder incontinence and dysuria.    Musculoskeletal:  See HPI    Neurological: Negative for numbness.    Psychiatric/Behavioral: Negative for depression.  The patient is not nervous/anxious.      Endocrine: Negative for  polyuria    Hematologic/Lymphatic: Negative for bleeding problem.  Does not bruise/bleed easily.    Skin: Negative for poor would healing and rash      Physical Examination:    Vital Signs:    Vitals:    05/07/24 0908   BP: (!) 145/89   Pulse: 84       Body mass index is 21.29 kg/m².    General: No acute distress, alert and oriented, healthy appearing    HEENT: Head is atraumatic, mucous membranes are moist    Neck: Supples, no JVD    Cardiovascular: Palpable dorsalis pedis and posterior tibial pulses, regular rate and rhythm to those pulses    Lungs: Breathing non-labored    Skin: no rashes appreciated    Neurologic: Can flex and extend knees, ankles, and toes. Sensation is grossly intact    Left hip:  Range of motion left hip without significant pain today.  Point tenderness laterally over trochanteric bursa.  Positive Stinchfield.    X-rays:      Assessment::  Left hip osteoarthritis  Trochanteric bursitis    Plan:  Discussed all treatment with the patient.  Patient with a trochanteric bursitis in his likely related to her left hip osteoarthritis.  She is considering total hip arthroplasty in the future we would like to wait until the fall.  We will give her an injection today to try to temporize this.  We will see her back in 8 weeks or sooner    This note was generated with the assistance of ambient listening technology. Verbal consent was obtained by the patient and accompanying visitor(s) for the recording of patient appointment to facilitate this note. I attest to having reviewed and edited the generated note for accuracy, though some syntax or spelling errors may persist. Please contact the author of this note for any clarification.      This note was created using [employer] voice recognition software that occasionally misinterpreted phrases or words.    Consult note is delivered via Epic messaging service.

## 2024-05-07 NOTE — PROCEDURES
Large Joint Aspiration/Injection: L greater trochanteric bursa    Date/Time: 5/7/2024 9:00 AM    Performed by: Iliana Molina FNP  Authorized by: José Garcia MD    Consent Done?:  Yes (Verbal)  Indications:  Pain  Timeout: prior to procedure the correct patient, procedure, and site was verified    Prep: patient was prepped and draped in usual sterile fashion      Details:  Needle Size:  22 G  Ultrasonic Guidance for needle placement?: No    Approach:  Lateral  Location:  Hip  Site:  L greater trochanteric bursa  Medications:  5 mL LIDOcaine (PF) 20 mg/mL (2%) 20 mg/mL (2 %); 12 mg betamethasone acetate-betamethasone sodium phosphate 6 mg/mL  Patient tolerance:  Patient tolerated the procedure well with no immediate complications

## 2024-10-23 ENCOUNTER — TELEPHONE (OUTPATIENT)
Dept: ORTHOPEDICS | Facility: CLINIC | Age: 79
End: 2024-10-23
Payer: MEDICARE

## 2024-10-23 NOTE — TELEPHONE ENCOUNTER
Patient called for an earlier appointment but I let her know Dr. Garcia would be out at another facility and out of town over the next few weeks.

## 2024-11-14 ENCOUNTER — OFFICE VISIT (OUTPATIENT)
Dept: ORTHOPEDICS | Facility: CLINIC | Age: 79
End: 2024-11-14
Payer: MEDICARE

## 2024-11-14 ENCOUNTER — HOSPITAL ENCOUNTER (OUTPATIENT)
Dept: RADIOLOGY | Facility: CLINIC | Age: 79
Discharge: HOME | End: 2024-11-14
Attending: ORTHOPAEDIC SURGERY
Payer: MEDICARE

## 2024-11-14 VITALS
SYSTOLIC BLOOD PRESSURE: 159 MMHG | HEIGHT: 62 IN | HEART RATE: 81 BPM | WEIGHT: 113.19 LBS | DIASTOLIC BLOOD PRESSURE: 85 MMHG | BODY MASS INDEX: 20.83 KG/M2

## 2024-11-14 DIAGNOSIS — M25.562 PAIN IN BOTH KNEES, UNSPECIFIED CHRONICITY: Primary | ICD-10-CM

## 2024-11-14 DIAGNOSIS — M25.561 PAIN IN BOTH KNEES, UNSPECIFIED CHRONICITY: Primary | ICD-10-CM

## 2024-11-14 DIAGNOSIS — M17.0 PRIMARY OSTEOARTHRITIS OF KNEES, BILATERAL: ICD-10-CM

## 2024-11-14 DIAGNOSIS — M25.561 PAIN IN BOTH KNEES, UNSPECIFIED CHRONICITY: ICD-10-CM

## 2024-11-14 DIAGNOSIS — M25.562 PAIN IN BOTH KNEES, UNSPECIFIED CHRONICITY: ICD-10-CM

## 2024-11-14 PROCEDURE — 73564 X-RAY EXAM KNEE 4 OR MORE: CPT | Mod: 50,,, | Performed by: ORTHOPAEDIC SURGERY

## 2024-11-14 RX ORDER — LIDOCAINE HYDROCHLORIDE 20 MG/ML
5 INJECTION, SOLUTION EPIDURAL; INFILTRATION; INTRACAUDAL; PERINEURAL
Status: DISCONTINUED | OUTPATIENT
Start: 2024-11-14 | End: 2024-11-14 | Stop reason: HOSPADM

## 2024-11-14 RX ORDER — BETAMETHASONE SODIUM PHOSPHATE AND BETAMETHASONE ACETATE 3; 3 MG/ML; MG/ML
12 INJECTION, SUSPENSION INTRA-ARTICULAR; INTRALESIONAL; INTRAMUSCULAR; SOFT TISSUE
Status: DISCONTINUED | OUTPATIENT
Start: 2024-11-14 | End: 2024-11-14 | Stop reason: HOSPADM

## 2024-11-14 RX ADMIN — LIDOCAINE HYDROCHLORIDE 5 ML: 20 INJECTION, SOLUTION EPIDURAL; INFILTRATION; INTRACAUDAL; PERINEURAL at 03:11

## 2024-11-14 RX ADMIN — BETAMETHASONE SODIUM PHOSPHATE AND BETAMETHASONE ACETATE 12 MG: 3; 3 INJECTION, SUSPENSION INTRA-ARTICULAR; INTRALESIONAL; INTRAMUSCULAR; SOFT TISSUE at 03:11

## 2024-11-14 NOTE — PROCEDURES
Large Joint Aspiration/Injection: bilateral knee    Date/Time: 11/14/2024 3:15 PM    Performed by: Iliana Molina FNP  Authorized by: Iliana Molina FNP    Consent Done?:  Yes (Verbal)  Indications:  Arthritis and pain  Timeout: prior to procedure the correct patient, procedure, and site was verified    Prep: patient was prepped and draped in usual sterile fashion    Local anesthesia used?: No      Details:  Needle Size:  22 G  Ultrasonic Guidance for needle placement?: No    Approach:  Anterolateral  Location:  Knee  Laterality:  Bilateral  Site:  Bilateral knee  Medications (Right):  5 mL LIDOcaine (PF) 20 mg/mL (2%) 20 mg/mL (2 %); 12 mg betamethasone acetate-betamethasone sodium phosphate 6 mg/mL  Medications (Left):  5 mL LIDOcaine (PF) 20 mg/mL (2%) 20 mg/mL (2 %); 12 mg betamethasone acetate-betamethasone sodium phosphate 6 mg/mL  Patient tolerance:  Patient tolerated the procedure well with no immediate complications

## 2024-11-14 NOTE — PROGRESS NOTES
"Chief Complaint:   Chief Complaint   Patient presents with    Right Knee - Pain     Patient states she can walk on right knee, only when kneeling on knee is when she has pain. Patient knee has been in pain for a few months.    Left Knee - Pain     Patient states her left knee is more painful then right,  pain has been ongoing for a few months. Also when patients bends or kneels she has pain in her left knee.        History of present illness: Sherry Heredia is a 79 y.o. female, presents to clinic today in regards to bilateral knees.  States that she has pain throughout the knees.  This has worse with any kneeling.  States that this knee pain has been going on for some months now.  Anytime she does not any gardening she states the pain is worse throughout the knees.  Has to rest the next day before they get better.  Also takes anti-inflammatories to help with this.  She states the knee pain is also made worse with certain movements.  She has tried cortisone injections in the past.  This has she was able to get relief for about 3-4 months.  She would like to speak about her different options going forward today here in clinic.  Not ready for any type of surgical intervention.    Past Medical History:   Diagnosis Date    Age-related nuclear cataract of both eyes 5/23/2022    Chronic idiopathic constipation 5/23/2022    Claustrophobia     " close doors"    Diverticulitis     hx    Dry eyes     Gastroesophageal reflux disease without esophagitis 5/23/2022    [place] (hard of hearing)     Left knee pain     Dr Godwin    Migraine headache 5/23/2022    inpast    Mixed hyperlipidemia 5/23/2022    ASCVD Score = 19.8%     Primary hypertension 5/23/2022    Renal stones     hx renal stones    Right hip pain     Right hip replacement 2019    Wears glasses        Past Surgical History:   Procedure Laterality Date    CHOLECYSTECTOMY      COLONOSCOPY      Dr Love 10/2021    DENTAL SURGERY      HYSTERECTOMY  03/02/2023    JOINT " REPLACEMENT      mammogram 07/2021      [facility] breast center    TRHR      2019       Current Outpatient Medications   Medication Sig    Lactobacillus acidophilus (PROBIOTIC ACIDOPHILUS ORAL) Take by mouth. Jose-biotic 1 daily    lisinopriL 10 MG tablet Take 1 tablet (10 mg total) by mouth once daily.    multivitamin/iron/folic acid (CENTRUM ORAL) Take by mouth. By mouth daily    omeprazole (PRILOSEC) 40 MG capsule Take 1 capsule (40 mg total) by mouth once daily.    RESTASIS 0.05 % ophthalmic emulsion Place 1 drop into both eyes 2 (two) times daily.    valACYclovir (VALTREX) 1000 MG tablet See Instructions, TAKE TWO TABLETS TWICE DAILY FOR 2 DOSES PRN FOR FEVER BLISTERS, # 10 tab(s), 3 Refill(s), Pharmacy: [facility], 157, cm, Height/Length Dosing, 01/27/22 8:56:00 CST, 51.95, kg, Weight Dosing, 01/27/22 8:56:00 CST    acyclovir 5% (ZOVIRAX) 5 % ointment Apply topically 4 (four) times daily as needed (fever blister). (Patient not taking: Reported on 11/14/2024)    artificial tears ointment (REFRESH P.M.) Oint every evening. Refresh ointment in right eye @ night (Patient not taking: Reported on 11/14/2024)    calcium carbonate/vitamin D3 (CALTRATE 600 + D ORAL) Take 1 tablet by mouth once daily at 6am. (Patient not taking: Reported on 11/14/2024)    carbamide peroxide (DEBROX) 6.5 % otic solution Place 5 drops into the right ear 2 (two) times daily as needed (ear wax). (Patient not taking: Reported on 11/14/2024)    cetirizine (ZYRTEC) 10 MG tablet Take 1 tablet (10 mg total) by mouth once daily. (Patient not taking: Reported on 11/14/2024)    conjugated estrogens (PREMARIN) vaginal cream Place 0.5 g vaginally once daily for 14 days, THEN 0.5 g twice a week. (Patient not taking: Reported on 11/14/2024)    denosumab (PROLIA) 60 mg/mL Syrg Inject 1 mL (60 mg total) into the skin every 6 (six) months. (Patient not taking: Reported on 11/14/2024)    erythromycin (ROMYCIN) ophthalmic ointment SMARTSIG:Sparingly In  Eye(s) Every Night (Patient not taking: Reported on 11/14/2024)    eszopiclone (LUNESTA) 3 mg Tab Take 1 tablet (3 mg total) by mouth nightly as needed (Insomnia). (Patient not taking: Reported on 11/14/2024)    ezetimibe (ZETIA) 10 mg tablet Take 1 tablet (10 mg total) by mouth once daily. (Patient not taking: Reported on 11/14/2024)    hydrocortisone 1 % cream Apply topically 2 (two) times daily as needed (fissures, hemorrhoids). (Patient not taking: Reported on 11/14/2024)    hydrOXYzine pamoate (VISTARIL) 25 MG Cap Take 1 capsule (25 mg total) by mouth nightly as needed (nasal congestion). (Patient not taking: Reported on 11/14/2024)    montelukast (SINGULAIR) 10 mg tablet Take 1 tablet (10 mg total) by mouth every evening. (Patient not taking: Reported on 11/14/2024)    moxifloxacin (VIGAMOX) 0.5 % ophthalmic solution Place 1 drop into the right eye 4 (four) times daily. (Patient not taking: Reported on 11/14/2024)    mv,Ca,min-iron-FA-lycopene 8 mg iron- 200 mcg-600 mcg Tab Take by mouth. (Patient not taking: Reported on 11/14/2024)    promethazine-dextromethorphan (PROMETHAZINE-DM) 6.25-15 mg/5 mL Syrp Take 5 mLs by mouth every 6 (six) hours as needed (cough). (Patient not taking: Reported on 11/14/2024)     No current facility-administered medications for this visit.       Review of patient's allergies indicates:   Allergen Reactions    Penicillin g benzathine      Other reaction(s): Unknown    Statins-hmg-coa reductase inhibitors Other (See Comments)     Muscle Aches      Sulfamethoxazole      Other reaction(s): itcing, red splotches    Codeine Nausea And Vomiting       Family History   Problem Relation Name Age of Onset    Heart disease Mother      Heart disease Father         Social History     Socioeconomic History    Marital status:    Tobacco Use    Smoking status: Never     Passive exposure: Never    Smokeless tobacco: Never   Substance and Sexual Activity    Alcohol use: Yes     Comment: 1-2  glasses of wine per year    Drug use: Never    Sexual activity: Yes     Partners: Male     Comment:    Social History Narrative    ** Merged History Encounter **         ** Merged History Encounter **          Social Drivers of Health     Financial Resource Strain: Low Risk  (3/25/2024)    [facility]    Overall Financial Resource Strain (CARDIA)     Difficulty of Paying Living Expenses: Not hard at all   Food Insecurity: No Food Insecurity (3/25/2024)    [facility]    Hunger Vital Sign     Worried About Running Out of Food in the Last Year: Never true     Ran Out of Food in the Last Year: Never true   Transportation Needs: No Transportation Needs (3/25/2024)    Received from [facility]    PRAPARE - Transportation     Lack of Transportation (Medical): No     Lack of Transportation (Non-Medical): No   Physical Activity: Sufficiently Active (3/25/2024)    Received from [facility]    Exercise Vital Sign     Days of Exercise per Week: 7 days     Minutes of Exercise per Session: 150+ min   Stress: No Stress Concern Present (3/25/2024)    [facility]    [employer] - Occupational Stress Questionnaire     Feeling of Stress : Not at all   Housing Stability: Low Risk  (5/8/2023)    Housing Stability Vital Sign     Unable to Pay for Housing in the Last Year: No     Number of Places Lived in the Last Year: 1     Unstable Housing in the Last Year: No           Review of Systems:    Denies fevers, chills, chest pain, shortness of breath. Comprehensive review of systems performed and  otherwise negative except as noted in HPI     Physical Examination:    General: awake and alert, no acute distress, healthy appearing  Head and Neck: Head atraumatic/normocephalic. Moist MM  CV: brisk cap refill  Lungs: non-labored breathing, w/o cough or SOB  Skin: no rashes present, warm to touch  Neuro: sensation grossly intact distally       Vital Signs:    Vitals:    11/14/24 1533   BP: (!) 159/85   Pulse: 81       Body mass index is 20.7 kg/m².    Bilateral knees:  Skin warm, dry, intact.  Tenderness to palpation along the lateral aspect of the knee.  Crepitus noted throughout range of motion.  Brisk capillary refill distally.  Sensation intact distally.    X-rays:  Four views of bilateral knees reviewed.  Arthritic changes noted especially in the flexion view with bone-on-bone narrowing in the lateral joint spaces of bilateral knees.  Osteophytes noted throughout the knees.     Assessment::  Primary osteoarthritis bilateral knees    Plan:  Patient presents to clinic today in regards to bilateral knee pain.  Left is worse than the right.  We spoke about her different options going forward and we will give her cortisone injections today here in clinic as he should help to relieve his symptoms of her knee pain.  We will have her follow up in about 6-8 weeks to reassess her knee pain as she has not had cortisone in quite some time now.  Patient states understanding and agrees with plan of care.    This note was created using [employer] voice recognition software that occasionally misinterpreted phrases or words.    Consult note is delivered via Epic messaging service.

## 2025-01-14 ENCOUNTER — OFFICE VISIT (OUTPATIENT)
Dept: ORTHOPEDICS | Facility: CLINIC | Age: 80
End: 2025-01-14
Payer: MEDICARE

## 2025-01-14 VITALS
WEIGHT: 114 LBS | BODY MASS INDEX: 20.98 KG/M2 | DIASTOLIC BLOOD PRESSURE: 87 MMHG | HEART RATE: 80 BPM | SYSTOLIC BLOOD PRESSURE: 147 MMHG | HEIGHT: 62 IN

## 2025-01-14 DIAGNOSIS — M17.12 PRIMARY OSTEOARTHRITIS OF LEFT KNEE: Primary | ICD-10-CM

## 2025-01-14 PROCEDURE — 3079F DIAST BP 80-89 MM HG: CPT | Mod: CPTII,,, | Performed by: ORTHOPAEDIC SURGERY

## 2025-01-14 PROCEDURE — 1159F MED LIST DOCD IN RCRD: CPT | Mod: CPTII,,, | Performed by: ORTHOPAEDIC SURGERY

## 2025-01-14 PROCEDURE — 3288F FALL RISK ASSESSMENT DOCD: CPT | Mod: CPTII,,, | Performed by: ORTHOPAEDIC SURGERY

## 2025-01-14 PROCEDURE — 1101F PT FALLS ASSESS-DOCD LE1/YR: CPT | Mod: CPTII,,, | Performed by: ORTHOPAEDIC SURGERY

## 2025-01-14 PROCEDURE — 99214 OFFICE O/P EST MOD 30 MIN: CPT | Mod: ,,, | Performed by: ORTHOPAEDIC SURGERY

## 2025-01-14 PROCEDURE — 3077F SYST BP >= 140 MM HG: CPT | Mod: CPTII,,, | Performed by: ORTHOPAEDIC SURGERY

## 2025-01-14 NOTE — PROGRESS NOTES
"  Chief Complaint:   Chief Complaint   Patient presents with    Left Knee - Follow-up     F/u ORIANA knee pain cortisone injection is not helping anymore patient states she has questions about knee surgery for her left knee or if she should try other injections.  Pain score 6 just pain and the pain changes depending on what she does doing the day.     Right Knee - Follow-up     F/u ORIANA knee pain cortisone injection is not helping anymore. Pain score about 2        History of present illness:    History of Present Illness  The patient is a 80-year-old female who presents for evaluation of bilateral knee pain.    She received cortisone injections in both knees in November, with the left knee causing more discomfort than the right. The pain in her left knee was severe a few nights ago, prompting her to seek immediate medical attention. The pain is described as all-encompassing around the knee and extends down to her ankle. She also reports hip pain. The cortisone injections provided relief for approximately one week. She is considering the use of knee sleeves for additional support. She experiences difficulty in standing up after prolonged sitting, such as when working on the computer, due to knee stiffness. However, once she starts moving, the stiffness subsides. She has a history of hip replacement surgery and notes that she bruises easily.    She had an x-ray because she was having back pain and was told it was due to osteoporosis. She also reports persistent swelling in her ankle.    Supplemental Information  She stepped on a picture frame while cleaning the house in November, resulting in a deep wound that is healing slowly.    Past Medical History:   Diagnosis Date    Age-related nuclear cataract of both eyes 5/23/2022    Chronic idiopathic constipation 5/23/2022    Claustrophobia     " close doors"    Diverticulitis     hx    Dry eyes     Gastroesophageal reflux disease without esophagitis 5/23/2022    [place] (hard of " hearing)     Left knee pain     Dr Godwin    Migraine headache 5/23/2022    inpast    Mixed hyperlipidemia 5/23/2022    ASCVD Score = 19.8%     Primary hypertension 5/23/2022    Renal stones     hx renal stones    Right hip pain     Right hip replacement 2019    Wears glasses        Past Surgical History:   Procedure Laterality Date    CHOLECYSTECTOMY      COLONOSCOPY      Dr Love 10/2021    DENTAL SURGERY      HYSTERECTOMY  03/02/2023    JOINT REPLACEMENT      mammogram 07/2021      [facility]    TRHR      2019       Current Outpatient Medications   Medication Sig    eszopiclone (LUNESTA) 3 mg Tab Take 1 tablet (3 mg total) by mouth nightly as needed (Insomnia).    ezetimibe (ZETIA) 10 mg tablet Take 1 tablet (10 mg total) by mouth once daily.    lisinopriL 10 MG tablet Take 1 tablet (10 mg total) by mouth once daily.    multivitamin/iron/folic acid (CENTRUM ORAL) Take by mouth. By mouth daily    omeprazole (PRILOSEC) 40 MG capsule Take 1 capsule (40 mg total) by mouth once daily.    RESTASIS 0.05 % ophthalmic emulsion Place 1 drop into both eyes 2 (two) times daily.    valACYclovir (VALTREX) 1000 MG tablet See Instructions, TAKE TWO TABLETS TWICE DAILY FOR 2 DOSES PRN FOR FEVER BLISTERS, # 10 tab(s), 3 Refill(s), Pharmacy: [facility], 157, cm, Height/Length Dosing, 01/27/22 8:56:00 CST, 51.95, kg, Weight Dosing, 01/27/22 8:56:00 CST    acyclovir 5% (ZOVIRAX) 5 % ointment Apply topically 4 (four) times daily as needed (fever blister). (Patient not taking: Reported on 11/14/2024)    artificial tears ointment (REFRESH P.M.) Oint every evening. Refresh ointment in right eye @ night (Patient not taking: Reported on 1/14/2025)    calcium carbonate/vitamin D3 (CALTRATE 600 + D ORAL) Take 1 tablet by mouth once daily at 6am. (Patient not taking: Reported on 11/14/2024)    carbamide peroxide (DEBROX) 6.5 % otic solution Place 5 drops into the right ear 2 (two) times daily as needed (ear wax). (Patient  not taking: Reported on 11/14/2024)    cetirizine (ZYRTEC) 10 MG tablet Take 1 tablet (10 mg total) by mouth once daily. (Patient not taking: Reported on 11/14/2024)    conjugated estrogens (PREMARIN) vaginal cream Place 0.5 g vaginally once daily for 14 days, THEN 0.5 g twice a week. (Patient not taking: Reported on 11/14/2024)    denosumab (PROLIA) 60 mg/mL Syrg Inject 1 mL (60 mg total) into the skin every 6 (six) months. (Patient not taking: Reported on 11/14/2024)    erythromycin (ROMYCIN) ophthalmic ointment SMARTSIG:Sparingly In Eye(s) Every Night (Patient not taking: Reported on 11/14/2024)    hydrocortisone 1 % cream Apply topically 2 (two) times daily as needed (fissures, hemorrhoids). (Patient not taking: Reported on 11/14/2024)    hydrOXYzine pamoate (VISTARIL) 25 MG Cap Take 1 capsule (25 mg total) by mouth nightly as needed (nasal congestion). (Patient not taking: Reported on 11/14/2024)    Lactobacillus acidophilus (PROBIOTIC ACIDOPHILUS ORAL) Take by mouth. Jose-biotic 1 daily (Patient not taking: Reported on 1/14/2025)    montelukast (SINGULAIR) 10 mg tablet Take 1 tablet (10 mg total) by mouth every evening. (Patient not taking: Reported on 11/14/2024)    moxifloxacin (VIGAMOX) 0.5 % ophthalmic solution Place 1 drop into the right eye 4 (four) times daily. (Patient not taking: Reported on 11/14/2024)    mv,Ca,min-iron-FA-lycopene 8 mg iron- 200 mcg-600 mcg Tab Take by mouth. (Patient not taking: Reported on 11/14/2024)    promethazine-dextromethorphan (PROMETHAZINE-DM) 6.25-15 mg/5 mL Syrp Take 5 mLs by mouth every 6 (six) hours as needed (cough). (Patient not taking: Reported on 11/14/2024)     No current facility-administered medications for this visit.       Review of patient's allergies indicates:   Allergen Reactions    Penicillin g benzathine      Other reaction(s): Unknown    Statins-hmg-coa reductase inhibitors Other (See Comments)     Muscle Aches      Sulfamethoxazole      Other  reaction(s): itcing, red splotches    Codeine Nausea And Vomiting       Family History   Problem Relation Name Age of Onset    Heart disease Mother      Heart disease Father         Social History     Socioeconomic History    Marital status:    Tobacco Use    Smoking status: Never     Passive exposure: Never    Smokeless tobacco: Never   Substance and Sexual Activity    Alcohol use: Yes     Comment: 1-2 glasses of wine per year    Drug use: Never    Sexual activity: Yes     Partners: Male     Comment:    Social History Narrative    ** Merged History Encounter **         ** Merged History Encounter **          Social Drivers of Health     Financial Resource Strain: Low Risk  (3/25/2024)    [facility] Its Subsidiaries and Affiliates    Overall Financial Resource Strain (CARDIA)     Difficulty of Paying Living Expenses: Not hard at all   Food Insecurity: No Food Insecurity (3/25/2024)    [facility] and Its Subsidiaries and Affiliates    Hunger Vital Sign     Worried About Running Out of Food in the Last Year: Never true     Ran Out of Food in the Last Year: Never true   Transportation Needs: No Transportation Needs (3/25/2024)    Received from [facility] and Its Subsidiaries and Affiliates    PRAPARE - Transportation     Lack of Transportation (Medical): No     Lack of Transportation (Non-Medical): No   Physical Activity: Sufficiently Active (3/25/2024)    [facility]    Exercise Vital Sign     Days of Exercise per Week: 7 days     Minutes of Exercise per Session: 150+ min   Stress: No Stress Concern Present (3/25/2024)    Received from [facility] and Its Subsidiaries and Affiliates    [employer] [place] [employer] -  Occupational Stress Questionnaire     Feeling of Stress : Not at all   Housing Stability: Low Risk  (5/8/2023)    Housing Stability Vital Sign     Unable to Pay for Housing in the Last Year: No     Number of Places Lived in the Last Year: 1     Unstable Housing in the Last Year: No           Review of Systems:    Constitution: Negative for chills, fever, and sweats.  Negative for unexplained weight loss.    HENT:  Negative for headaches and blurry vision.    Cardiovascular:Negative for chest pain or irregular heart beat. Negative for hypertension.    Respiratory:  Negative for cough and shortness of breath.    Gastrointestinal: Negative for abdominal pain, heartburn, melena, nausea, and vomitting.    Genitourinary:  Negative bladder incontinence and dysuria.    Musculoskeletal:  See HPI    Neurological: Negative for numbness.    Psychiatric/Behavioral: Negative for depression.  The patient is not nervous/anxious.      Endocrine: Negative for polyuria    Hematologic/Lymphatic: Negative for bleeding problem.  Does not bruise/bleed easily.    Skin: Negative for poor would healing and rash      Physical Examination:    Vital Signs:    Vitals:    01/14/25 0836   BP: (!) 148/90   Pulse: 87       Body mass index is 20.85 kg/m².    General: No acute distress, alert and oriented, healthy appearing    HEENT: Head is atraumatic, mucous membranes are moist    Neck: Supples, no JVD    Cardiovascular: Palpable dorsalis pedis and posterior tibial pulses, regular rate and rhythm to those pulses    Lungs: Breathing non-labored    Skin: no rashes appreciated    Neurologic: Can flex and extend knees, ankles, and toes. Sensation is grossly intact    Left knee:  Brisk cap refill distally.  Sensation intact disappeared range of motion of the left knee is from 5-105.  Sensation intact distally.  Crepitus range motion.  Tenderness of the medial joint line.    X-rays:  Four views of bilateral knees reviewed today.  Patient's with Kellgren  Jayy grade 3 changes of the left knee.  Kellgren Jayy grade 4 changes of the right knee.     Assessment::  Left knee osteoarthritis    Plan:  Cortisone injection failed to relieve his symptoms over the patient.  We will plan to give her a Synvisc injection.  It did improve her symptoms for a week and really get rid of them all together therefore signifies that this is more coming from her hip and not from the knee other areas.  We will plan to get her set up for viscosupplementation.    This note was generated with the assistance of ambient listening technology. Verbal consent was obtained by the patient and accompanying visitor(s) for the recording of patient appointment to facilitate this note. I attest to having reviewed and edited the generated note for accuracy, though some syntax or spelling errors may persist. Please contact the author of this note for any clarification.      This note was created using [email or web address] voice recognition software that occasionally misinterpreted phrases or words.    Consult note is delivered via Epic messaging service.

## 2025-02-07 ENCOUNTER — OFFICE VISIT (OUTPATIENT)
Dept: ORTHOPEDICS | Facility: CLINIC | Age: 80
End: 2025-02-07
Payer: MEDICARE

## 2025-02-07 VITALS
HEART RATE: 105 BPM | BODY MASS INDEX: 20.73 KG/M2 | WEIGHT: 112.63 LBS | SYSTOLIC BLOOD PRESSURE: 143 MMHG | DIASTOLIC BLOOD PRESSURE: 85 MMHG | HEIGHT: 62 IN

## 2025-02-07 DIAGNOSIS — M17.12 PRIMARY OSTEOARTHRITIS OF LEFT KNEE: Primary | ICD-10-CM

## 2025-02-07 PROCEDURE — 1101F PT FALLS ASSESS-DOCD LE1/YR: CPT | Mod: CPTII,,, | Performed by: NURSE PRACTITIONER

## 2025-02-07 PROCEDURE — 1159F MED LIST DOCD IN RCRD: CPT | Mod: CPTII,,, | Performed by: NURSE PRACTITIONER

## 2025-02-07 PROCEDURE — 3288F FALL RISK ASSESSMENT DOCD: CPT | Mod: CPTII,,, | Performed by: NURSE PRACTITIONER

## 2025-02-07 PROCEDURE — 3077F SYST BP >= 140 MM HG: CPT | Mod: CPTII,,, | Performed by: NURSE PRACTITIONER

## 2025-02-07 PROCEDURE — 3079F DIAST BP 80-89 MM HG: CPT | Mod: CPTII,,, | Performed by: NURSE PRACTITIONER

## 2025-02-07 PROCEDURE — 20610 DRAIN/INJ JOINT/BURSA W/O US: CPT | Mod: LT,,, | Performed by: NURSE PRACTITIONER

## 2025-02-07 PROCEDURE — 99213 OFFICE O/P EST LOW 20 MIN: CPT | Mod: 25,,, | Performed by: NURSE PRACTITIONER

## 2025-02-07 NOTE — PROCEDURES
Large Joint Aspiration/Injection: L knee    Date/Time: 2/7/2025 8:30 AM    Performed by: Iliana Molina FNP  Authorized by: Iliana Molina FNP    Consent Done?:  Yes (Verbal)  Indications:  Pain and arthritis  Site marked: the procedure site was marked    Timeout: prior to procedure the correct patient, procedure, and site was verified    Prep: patient was prepped and draped in usual sterile fashion    Local anesthesia used?: No      Details:  Needle Size:  22 G  Ultrasonic Guidance for needle placement?: No    Approach:  Anterolateral  Location:  Knee  Site:  L knee  Medications:  48 mg hylan g-f 20 48 mg/6 mL  Patient tolerance:  Patient tolerated the procedure well with no immediate complications

## 2025-02-07 NOTE — PROGRESS NOTES
"Chief Complaint:   Chief Complaint   Patient presents with    Injections     Lt knee synvisc inj - Pt states pain has gotten worse over the last few weeks. Keeps her up at night. Complaints of swelling, pt does cold compress.  Pt states knee will stiffness when sitting/laying down for too long. Pain is radiating down to the feet.        History of present illness: Sherry Heredia is a 79 y.o. female, presents to clinic today in regards to her left knee.  Patient does have a long known history of osteoarthritis to this knee.  She has received a cortisone injection in the past.  This gave her relief but did not last very long.  Here today for another injection.  Has been approved for viscosupplementation injection.  She would like to continue with the conservative measures at this time.  She does have pain throughout the knee.  This has worse with prolonged ambulation any bending.  Dependent upon how this injection works she would possibly like to speak about surgical intervention at her next appointment.    Past Medical History:   Diagnosis Date    Age-related nuclear cataract of both eyes 5/23/2022    Chronic idiopathic constipation 5/23/2022    Claustrophobia     " close doors"    Diverticulitis     hx    Dry eyes     Gastroesophageal reflux disease without esophagitis 5/23/2022    [place] (hard of hearing)     Left knee pain     Dr Godwin    Migraine headache 5/23/2022    inpast    Mixed hyperlipidemia 5/23/2022    ASCVD Score = 19.8%     Primary hypertension 5/23/2022    Renal stones     hx renal stones    Right hip pain     Right hip replacement 2019    Wears glasses        Past Surgical History:   Procedure Laterality Date    CHOLECYSTECTOMY      COLONOSCOPY      Dr Love 10/2021    DENTAL SURGERY      HYSTERECTOMY  03/02/2023    JOINT REPLACEMENT      mammogram 07/2021      [facility]    TRHR      2019       Current Outpatient Medications   Medication Sig    artificial tears ointment (REFRESH P.M.) Oint " every evening. Refresh ointment in right eye @ night    eszopiclone (LUNESTA) 3 mg Tab Take 1 tablet (3 mg total) by mouth nightly as needed (Insomnia).    ezetimibe (ZETIA) 10 mg tablet Take 1 tablet (10 mg total) by mouth once daily.    Lactobacillus acidophilus (PROBIOTIC ACIDOPHILUS ORAL) Take by mouth. Jose-biotic 1 daily    lisinopriL 10 MG tablet Take 1 tablet (10 mg total) by mouth once daily.    multivitamin/iron/folic acid (CENTRUM ORAL) Take by mouth. By mouth daily    omeprazole (PRILOSEC) 40 MG capsule Take 1 capsule (40 mg total) by mouth once daily.    RESTASIS 0.05 % ophthalmic emulsion Place 1 drop into both eyes 2 (two) times daily.    valACYclovir (VALTREX) 1000 MG tablet See Instructions, TAKE TWO TABLETS TWICE DAILY FOR 2 DOSES PRN FOR FEVER BLISTERS, # 10 tab(s), 3 Refill(s), Pharmacy: [facility], 157, cm, Height/Length Dosing, 01/27/22 8:56:00 CST, 51.95, kg, Weight Dosing, 01/27/22 8:56:00 CST    acyclovir 5% (ZOVIRAX) 5 % ointment Apply topically 4 (four) times daily as needed (fever blister). (Patient not taking: Reported on 11/14/2024)    calcium carbonate/vitamin D3 (CALTRATE 600 + D ORAL) Take 1 tablet by mouth once daily at 6am. (Patient not taking: Reported on 11/14/2024)    carbamide peroxide (DEBROX) 6.5 % otic solution Place 5 drops into the right ear 2 (two) times daily as needed (ear wax). (Patient not taking: Reported on 11/14/2024)    cetirizine (ZYRTEC) 10 MG tablet Take 1 tablet (10 mg total) by mouth once daily. (Patient not taking: Reported on 11/14/2024)    conjugated estrogens (PREMARIN) vaginal cream Place 0.5 g vaginally once daily for 14 days, THEN 0.5 g twice a week. (Patient not taking: Reported on 11/14/2024)    denosumab (PROLIA) 60 mg/mL Syrg Inject 1 mL (60 mg total) into the skin every 6 (six) months. (Patient not taking: Reported on 11/14/2024)    erythromycin (ROMYCIN) ophthalmic ointment SMARTSIG:Sparingly In Eye(s) Every Night (Patient not taking:  Reported on 11/14/2024)    hydrocortisone 1 % cream Apply topically 2 (two) times daily as needed (fissures, hemorrhoids). (Patient not taking: Reported on 11/14/2024)    hydrOXYzine pamoate (VISTARIL) 25 MG Cap Take 1 capsule (25 mg total) by mouth nightly as needed (nasal congestion). (Patient not taking: Reported on 11/14/2024)    montelukast (SINGULAIR) 10 mg tablet Take 1 tablet (10 mg total) by mouth every evening. (Patient not taking: Reported on 11/14/2024)    moxifloxacin (VIGAMOX) 0.5 % ophthalmic solution Place 1 drop into the right eye 4 (four) times daily. (Patient not taking: Reported on 11/14/2024)    mv,Ca,min-iron-FA-lycopene 8 mg iron- 200 mcg-600 mcg Tab Take by mouth. (Patient not taking: Reported on 11/14/2024)    promethazine-dextromethorphan (PROMETHAZINE-DM) 6.25-15 mg/5 mL Syrp Take 5 mLs by mouth every 6 (six) hours as needed (cough). (Patient not taking: Reported on 11/14/2024)     No current facility-administered medications for this visit.       Review of patient's allergies indicates:   Allergen Reactions    Penicillin g benzathine      Other reaction(s): Unknown    Statins-hmg-coa reductase inhibitors Other (See Comments)     Muscle Aches      Sulfamethoxazole      Other reaction(s): itcing, red splotches    Codeine Nausea And Vomiting       Family History   Problem Relation Name Age of Onset    Heart disease Mother      Heart disease Father         Social History     Socioeconomic History    Marital status:    Tobacco Use    Smoking status: Never     Passive exposure: Never    Smokeless tobacco: Never   Substance and Sexual Activity    Alcohol use: Yes     Comment: 1-2 glasses of wine per year    Drug use: Never    Sexual activity: Yes     Partners: Male     Comment:    Social History Narrative    ** Merged History Encounter **         ** Merged History Encounter **          Social Drivers of Health     Financial Resource Strain: Low Risk  (3/25/2024)    [facility]    Overall Financial Resource Strain (CARDIA)     Difficulty of Paying Living Expenses: Not hard at all   Food Insecurity: No Food Insecurity (3/25/2024)    [facility]    Hunger Vital Sign     Worried About Running Out of Food in the Last Year: Never true     Ran Out of Food in the Last Year: Never true   Transportation Needs: No Transportation Needs (3/25/2024)    [facility]    PRAPARE - Transportation     Lack of Transportation (Medical): No     Lack of Transportation (Non-Medical): No   Physical Activity: Sufficiently Active (3/25/2024)    Received from [facility] and [facility]ates    Exercise Vital Sign     Days of Exercise per Week: 7 days     Minutes of Exercise per Session: 150+ min   Stress: No Stress Concern Present (3/25/2024)    [facility]    [employer] [place] [employer] - Occupational Stress Questionnaire     Feeling of Stress : Not at all   Housing Stability: Low Risk  (5/8/2023)    Housing Stability Vital Sign     Unable to Pay for Housing in the Last Year: No     Number of Places Lived in the Last Year: 1     Unstable Housing in the Last Year: No           Review of Systems:    Denies fevers, chills, chest pain, shortness of breath. Comprehensive review of systems performed and otherwise negative except as noted in HPI     Physical Examination:    General: awake and alert, no acute distress, healthy appearing  Head and Neck: Head atraumatic/normocephalic. Moist MM  CV: brisk cap refill  Lungs: non-labored breathing, w/o cough or SOB  Skin: no rashes present, warm to touch  Neuro:  sensation grossly intact distally       Vital Signs:    Vitals:    02/07/25 0839   BP: (!) 143/85   Pulse: 105       Body mass index is 20.59 kg/m².    L knee:  Skin warm, dry, intact.  Tenderness to palpation along the lateral aspect of the knee.  Crepitus noted throughout range of motion.  Brisk capillary refill distally.  Sensation intact distally.         Assessment::Primay OA left knee    Plan:  Presents to clinic today in regards to her left knee.  She has been approved for viscosupplementation injection for pain.  We will give her this injection today here in clinic to help calm her pain down.  Patient was educated to give this a full 10 days since he had weeks to give her the full relief.  Also to rest, ice and elevate as needed.  This is her 1st injection we will get him back here in 3 months for re-evaluation.  At that time patient would possibly like to speak about surgical intervention if this injection does not give her good relief.  Or we will call if needing to be seen prior to this to schedule a date.  Patient states understanding and agrees with plan of care.    This note was created using [employer] voice recognition software that occasionally misinterpreted phrases or words.    Consult note is delivered via Epic messaging service.

## 2025-02-18 ENCOUNTER — OFFICE VISIT (OUTPATIENT)
Dept: ORTHOPEDICS | Facility: CLINIC | Age: 80
End: 2025-02-18
Payer: MEDICARE

## 2025-02-18 VITALS — WEIGHT: 113 LBS | BODY MASS INDEX: 20.8 KG/M2 | HEIGHT: 62 IN

## 2025-02-18 DIAGNOSIS — M17.12 PRIMARY OSTEOARTHRITIS OF LEFT KNEE: Primary | ICD-10-CM

## 2025-02-18 PROCEDURE — 99214 OFFICE O/P EST MOD 30 MIN: CPT | Mod: ,,, | Performed by: ORTHOPAEDIC SURGERY

## 2025-02-18 NOTE — PROGRESS NOTES
"  Chief Complaint:   Chief Complaint   Patient presents with    Left Knee - Follow-up     Pt wants to discuss L knee sx. Pt states knee is getting really bad and causing R knee to start hurting.        History of present illness:    History of Present Illness  The patient presents for evaluation of bilateral knee pain.    She reports that her left knee is causing more discomfort than the right, despite receiving a gel injection approximately 2 weeks ago. The patient was informed that the effects of the injection would take about 6 to 8 weeks to manifest. However, she has not experienced any relief from the injection or the previous cortisone injection. She expresses a desire to avoid further injections and is considering knee replacement surgery. The patient also mentions that she has been experiencing distressing dreams related to her fear of the upcoming surgery. She recalls attending a class prior to her hip surgery and believes she would benefit from a similar class before her knee surgery. The patient has a history of nausea and vomiting post-anesthesia, which occurred following her hip surgery. She is currently under the care of Dr. Mercado for cardiac issues and recently had a checkup. The patient reports experiencing palpitations. The right knee pain is particularly noticeable when descending stairs, with the discomfort localized above the kneecap.    Past Medical History:   Diagnosis Date    Age-related nuclear cataract of both eyes 5/23/2022    Chronic idiopathic constipation 5/23/2022    Claustrophobia     " close doors"    Diverticulitis     hx    Dry eyes     Gastroesophageal reflux disease without esophagitis 5/23/2022    [place] (hard of hearing)     Left knee pain     Dr Godwin    Migraine headache 5/23/2022    inpast    Mixed hyperlipidemia 5/23/2022    ASCVD Score = 19.8%     Primary hypertension 5/23/2022    Renal stones     hx renal stones    Right hip pain     Right hip replacement 2019    Wears " glasses        Past Surgical History:   Procedure Laterality Date    CHOLECYSTECTOMY      COLONOSCOPY      Dr Love 10/2021    DENTAL SURGERY      HYSTERECTOMY  03/02/2023    JOINT REPLACEMENT      mammogram 07/2021      [facility]    TRHR      2019       Current Outpatient Medications   Medication Sig    acyclovir 5% (ZOVIRAX) 5 % ointment Apply topically 4 (four) times daily as needed (fever blister). (Patient not taking: Reported on 11/14/2024)    artificial tears ointment (REFRESH P.M.) Oint every evening. Refresh ointment in right eye @ night    calcium carbonate/vitamin D3 (CALTRATE 600 + D ORAL) Take 1 tablet by mouth once daily at 6am. (Patient not taking: Reported on 11/14/2024)    carbamide peroxide (DEBROX) 6.5 % otic solution Place 5 drops into the right ear 2 (two) times daily as needed (ear wax). (Patient not taking: Reported on 11/14/2024)    cetirizine (ZYRTEC) 10 MG tablet Take 1 tablet (10 mg total) by mouth once daily. (Patient not taking: Reported on 11/14/2024)    conjugated estrogens (PREMARIN) vaginal cream Place 0.5 g vaginally once daily for 14 days, THEN 0.5 g twice a week. (Patient not taking: Reported on 11/14/2024)    denosumab (PROLIA) 60 mg/mL Syrg Inject 1 mL (60 mg total) into the skin every 6 (six) months. (Patient not taking: Reported on 11/14/2024)    erythromycin (ROMYCIN) ophthalmic ointment SMARTSIG:Sparingly In Eye(s) Every Night (Patient not taking: Reported on 11/14/2024)    eszopiclone (LUNESTA) 3 mg Tab Take 1 tablet (3 mg total) by mouth nightly as needed (Insomnia).    ezetimibe (ZETIA) 10 mg tablet Take 1 tablet (10 mg total) by mouth once daily.    hydrocortisone 1 % cream Apply topically 2 (two) times daily as needed (fissures, hemorrhoids). (Patient not taking: Reported on 11/14/2024)    hydrOXYzine pamoate (VISTARIL) 25 MG Cap Take 1 capsule (25 mg total) by mouth nightly as needed (nasal congestion). (Patient not taking: Reported on 11/14/2024)     Lactobacillus acidophilus (PROBIOTIC ACIDOPHILUS ORAL) Take by mouth. Jose-biotic 1 daily    lisinopriL 10 MG tablet Take 1 tablet (10 mg total) by mouth once daily.    montelukast (SINGULAIR) 10 mg tablet Take 1 tablet (10 mg total) by mouth every evening. (Patient not taking: Reported on 11/14/2024)    moxifloxacin (VIGAMOX) 0.5 % ophthalmic solution Place 1 drop into the right eye 4 (four) times daily. (Patient not taking: Reported on 11/14/2024)    multivitamin/iron/folic acid (CENTRUM ORAL) Take by mouth. By mouth daily    mv,Ca,min-iron-FA-lycopene 8 mg iron- 200 mcg-600 mcg Tab Take by mouth. (Patient not taking: Reported on 11/14/2024)    omeprazole (PRILOSEC) 40 MG capsule Take 1 capsule (40 mg total) by mouth once daily.    promethazine-dextromethorphan (PROMETHAZINE-DM) 6.25-15 mg/5 mL Syrp Take 5 mLs by mouth every 6 (six) hours as needed (cough). (Patient not taking: Reported on 11/14/2024)    RESTASIS 0.05 % ophthalmic emulsion Place 1 drop into both eyes 2 (two) times daily.    valACYclovir (VALTREX) 1000 MG tablet See Instructions, TAKE TWO TABLETS TWICE DAILY FOR 2 DOSES PRN FOR FEVER BLISTERS, # 10 tab(s), 3 Refill(s), Pharmacy: [facility], 157, cm, Height/Length Dosing, 01/27/22 8:56:00 CST, 51.95, kg, Weight Dosing, 01/27/22 8:56:00 CST     No current facility-administered medications for this visit.       Review of patient's allergies indicates:   Allergen Reactions    Penicillin g benzathine      Other reaction(s): Unknown    Statins-hmg-coa reductase inhibitors Other (See Comments)     Muscle Aches      Sulfamethoxazole      Other reaction(s): itcing, red splotches    Codeine Nausea And Vomiting       Family History   Problem Relation Name Age of Onset    Heart disease Mother      Heart disease Father         Social History[1]        Review of Systems:    Constitution: Negative for chills, fever, and sweats.  Negative for unexplained weight loss.    HENT:  Negative for headaches and  blurry vision.    Cardiovascular:Negative for chest pain or irregular heart beat. Negative for hypertension.    Respiratory:  Negative for cough and shortness of breath.    Gastrointestinal: Negative for abdominal pain, heartburn, melena, nausea, and vomitting.    Genitourinary:  Negative bladder incontinence and dysuria.    Musculoskeletal:  See HPI    Neurological: Negative for numbness.    Psychiatric/Behavioral: Negative for depression.  The patient is not nervous/anxious.      Endocrine: Negative for polyuria    Hematologic/Lymphatic: Negative for bleeding problem.  Does not bruise/bleed easily.    Skin: Negative for poor would healing and rash      Physical Examination:    Vital Signs:  There were no vitals filed for this visit.    Body mass index is 20.67 kg/m².    General: No acute distress, alert and oriented, healthy appearing    HEENT: Head is atraumatic, mucous membranes are moist    Neck: Supples, no JVD    Cardiovascular: Palpable dorsalis pedis and posterior tibial pulses, regular rate and rhythm to those pulses    Lungs: Breathing non-labored    Skin: no rashes appreciated    Neurologic: Can flex and extend knees, ankles, and toes. Sensation is grossly intact    Left knee:  Range of motion left knee with significant crepitus or discomfort.  Brisk cap refill distally.  Patient has extension of 5.  We will 110    X-rays:  Three views left knee reviewed.  Patient with end-stage osteoarthritis.  Kellgren Jayy grade 3 changes of the left knee     Assessment::  Left knee osteoarthritis    Plan:  At this point the patient is tried and failed all conservative management with regards to their knee. They have tried and failed nonoperative management including: Anti-inflammatories, activity modification, injections. All of these have failed to completely remove their pain. They have pain going up and down stairs as well as walking on level ground. The knee pain is affecting activities of daily living They  feel that theyve reached a point of disability with regards to their knee. X-rays reveal advanced, end-stage degenerative osteoarthritis as noted by subchondral sclerosis, joint space narrowing in the medial, lateral, patellofemoral compartment, and periarticular osteophytes. The patient would like to proceed with surgical intervention and would be a good candidate for total knee replacement.    Total knee arthroplasty procedure, alternatives, risks, and benefits were discussed in detail. The risks including but not limited to: infection, need for revision surgery, pain, swelling, loosening, injury to surrounding neurovascular structures, stiffness, incomplete resolution of pain, DVT, PE, and death were discussed in detail. Despite these risks, the patient would like to proceed with surgical intervention. All questions were answered, no guarantees made. Will plan for left TKA on April.      This note was generated with the assistance of ambient listening technology. Verbal consent was obtained by the patient and accompanying visitor(s) for the recording of patient appointment to facilitate this note. I attest to having reviewed and edited the generated note for accuracy, though some syntax or spelling errors may persist. Please contact the author of this note for any clarification.      This note was created using [employer] voice recognition software that occasionally misinterpreted phrases or words.    Consult note is delivered via Epic messaging service.         [1]   Social History  Socioeconomic History    Marital status:    Tobacco Use    Smoking status: Never     Passive exposure: Never    Smokeless tobacco: Never   Substance and Sexual Activity    Alcohol use: Yes     Comment: 1-2 glasses of wine per year    Drug use: Never    Sexual activity: Yes     Partners: Male     Comment:    Social History Narrative    ** Merged History Encounter **         ** Merged History Encounter **          Social  Drivers of Health     Financial Resource Strain: Low Risk  (3/25/2024)    [facility]    Overall Financial Resource Strain (CARDIA)     Difficulty of Paying Living Expenses: Not hard at all   Food Insecurity: No Food Insecurity (3/25/2024)    Received from [facility]sidi[facility] and Affiliates    Hunger Vital Sign     Worried About Running Out of Food in the Last Year: Never true     Ran Out of Food in the Last Year: Never true   Transportation Needs: No Transportation Needs (3/25/2024)    [facility]    PRAPARE - Transportation     Lack of Transportation (Medical): No     Lack of Transportation (Non-Medical): No   Physical Activity: Sufficiently Active (3/25/2024)    [facility]    Exercise Vital Sign     Days of Exercise per Week: 7 days     Minutes of Exercise per Session: 150+ min   Stress: No Stress Concern Present (3/25/2024)    Received [facility] Its [facility]sidiaries and Affiliates    [place] [facility] [employer] - Occupational Stress Questionnaire     Feeling of Stress : Not at all   Housing Stability: Low Risk  (5/8/2023)    Housing Stability Vital Sign     Unable to Pay for Housing in the Last Year: No     Number of Places Lived in the Last Year: 1     Unstable Housing in the Last Year: No

## 2025-03-20 ENCOUNTER — OFFICE VISIT (OUTPATIENT)
Dept: ORTHOPEDICS | Facility: CLINIC | Age: 80
End: 2025-03-20
Payer: MEDICARE

## 2025-03-20 ENCOUNTER — HOSPITAL ENCOUNTER (OUTPATIENT)
Dept: RADIOLOGY | Facility: HOSPITAL | Age: 80
Discharge: HOME | End: 2025-03-20
Attending: NURSE PRACTITIONER
Payer: MEDICARE

## 2025-03-20 ENCOUNTER — CLINICAL SUPPORT (OUTPATIENT)
Dept: LAB | Facility: HOSPITAL | Age: 80
End: 2025-03-20
Attending: NURSE PRACTITIONER
Payer: MEDICARE

## 2025-03-20 VITALS
SYSTOLIC BLOOD PRESSURE: 147 MMHG | BODY MASS INDEX: 20.77 KG/M2 | HEART RATE: 80 BPM | WEIGHT: 112.88 LBS | DIASTOLIC BLOOD PRESSURE: 87 MMHG | HEIGHT: 62 IN

## 2025-03-20 DIAGNOSIS — M17.12 PRIMARY OSTEOARTHRITIS OF LEFT KNEE: ICD-10-CM

## 2025-03-20 DIAGNOSIS — M17.12 PRIMARY OSTEOARTHRITIS OF LEFT KNEE: Primary | ICD-10-CM

## 2025-03-20 DIAGNOSIS — Z47.1 AFTERCARE FOLLOWING LEFT KNEE JOINT REPLACEMENT SURGERY: ICD-10-CM

## 2025-03-20 DIAGNOSIS — Z01.818 PREOPERATIVE TESTING: ICD-10-CM

## 2025-03-20 DIAGNOSIS — Z96.652 AFTERCARE FOLLOWING LEFT KNEE JOINT REPLACEMENT SURGERY: ICD-10-CM

## 2025-03-20 DIAGNOSIS — R79.9 ABNORMAL BLOOD CHEMISTRY LEVEL: ICD-10-CM

## 2025-03-20 DIAGNOSIS — M19.90 OSTEOARTHRITIS: ICD-10-CM

## 2025-03-20 LAB
ALBUMIN SERPL-MCNC: 4.2 G/DL (ref 3.4–4.8)
ALBUMIN/GLOB SERPL: 1.3 RATIO (ref 1.1–2)
ALP SERPL-CCNC: 98 UNIT/L (ref 40–150)
ALT SERPL-CCNC: 22 UNIT/L (ref 0–55)
ANION GAP SERPL CALC-SCNC: 11 MEQ/L
AST SERPL-CCNC: 22 UNIT/L (ref 5–34)
BASOPHILS # BLD AUTO: 0.06 X10(3)/MCL
BASOPHILS NFR BLD AUTO: 0.7 %
BILIRUB SERPL-MCNC: 0.4 MG/DL
BILIRUB UR QL STRIP.AUTO: NEGATIVE
BUN SERPL-MCNC: 21.8 MG/DL (ref 9.8–20.1)
CALCIUM SERPL-MCNC: 9.8 MG/DL (ref 8.4–10.2)
CHLORIDE SERPL-SCNC: 106 MMOL/L (ref 98–107)
CLARITY UR: CLEAR
CO2 SERPL-SCNC: 26 MMOL/L (ref 23–31)
COLOR UR AUTO: YELLOW
CREAT SERPL-MCNC: 0.88 MG/DL (ref 0.55–1.02)
CREAT/UREA NIT SERPL: 25
EOSINOPHIL # BLD AUTO: 0.13 X10(3)/MCL (ref 0–0.9)
EOSINOPHIL NFR BLD AUTO: 1.6 %
ERYTHROCYTE [DISTWIDTH] IN BLOOD BY AUTOMATED COUNT: 13.4 % (ref 11.5–17)
EST. AVERAGE GLUCOSE BLD GHB EST-MCNC: 105.4 MG/DL
GFR SERPLBLD CREATININE-BSD FMLA CKD-EPI: >60 ML/MIN/1.73/M2
GLOBULIN SER-MCNC: 3.3 GM/DL (ref 2.4–3.5)
GLUCOSE SERPL-MCNC: 114 MG/DL (ref 82–115)
GLUCOSE UR QL STRIP: NEGATIVE
HBA1C MFR BLD: 5.3 %
HCT VFR BLD AUTO: 40.1 % (ref 37–47)
HGB BLD-MCNC: 13 G/DL (ref 12–16)
HGB UR QL STRIP: NEGATIVE
IMM GRANULOCYTES # BLD AUTO: 0.02 X10(3)/MCL (ref 0–0.04)
IMM GRANULOCYTES NFR BLD AUTO: 0.2 %
KETONES UR QL STRIP: NEGATIVE
LEUKOCYTE ESTERASE UR QL STRIP: NEGATIVE
LYMPHOCYTES # BLD AUTO: 2.75 X10(3)/MCL (ref 0.6–4.6)
LYMPHOCYTES NFR BLD AUTO: 33.4 %
MCH RBC QN AUTO: 30.4 PG (ref 27–31)
MCHC RBC AUTO-ENTMCNC: 32.4 G/DL (ref 33–36)
MCV RBC AUTO: 93.7 FL (ref 80–94)
MONOCYTES # BLD AUTO: 0.65 X10(3)/MCL (ref 0.1–1.3)
MONOCYTES NFR BLD AUTO: 7.9 %
MRSA PCR SCRN (OHS): NOT DETECTED
NEUTROPHILS # BLD AUTO: 4.63 X10(3)/MCL (ref 2.1–9.2)
NEUTROPHILS NFR BLD AUTO: 56.2 %
NITRITE UR QL STRIP: NEGATIVE
NRBC BLD AUTO-RTO: 0 %
OHS QRS DURATION: 82 MS
OHS QTC CALCULATION: 441 MS
PH UR STRIP: 5.5 [PH]
PLATELET # BLD AUTO: 321 X10(3)/MCL (ref 130–400)
PMV BLD AUTO: 8.7 FL (ref 7.4–10.4)
POTASSIUM SERPL-SCNC: 4.7 MMOL/L (ref 3.5–5.1)
PROT SERPL-MCNC: 7.5 GM/DL (ref 5.8–7.6)
PROT UR QL STRIP: NEGATIVE
RBC # BLD AUTO: 4.28 X10(6)/MCL (ref 4.2–5.4)
SODIUM SERPL-SCNC: 143 MMOL/L (ref 136–145)
SP GR UR STRIP.AUTO: 1.01 (ref 1–1.03)
UROBILINOGEN UR STRIP-ACNC: 0.2
WBC # BLD AUTO: 8.24 X10(3)/MCL (ref 4.5–11.5)

## 2025-03-20 PROCEDURE — 80053 COMPREHEN METABOLIC PANEL: CPT

## 2025-03-20 PROCEDURE — 85025 COMPLETE CBC W/AUTO DIFF WBC: CPT

## 2025-03-20 PROCEDURE — 71046 X-RAY EXAM CHEST 2 VIEWS: CPT | Mod: TC

## 2025-03-20 PROCEDURE — 36415 COLL VENOUS BLD VENIPUNCTURE: CPT

## 2025-03-20 PROCEDURE — 93010 ELECTROCARDIOGRAM REPORT: CPT | Mod: ,,, | Performed by: INTERNAL MEDICINE

## 2025-03-20 PROCEDURE — 81003 URINALYSIS AUTO W/O SCOPE: CPT

## 2025-03-20 PROCEDURE — 83036 HEMOGLOBIN GLYCOSYLATED A1C: CPT

## 2025-03-20 PROCEDURE — 73700 CT LOWER EXTREMITY W/O DYE: CPT | Mod: TC,LT

## 2025-03-20 PROCEDURE — 93005 ELECTROCARDIOGRAM TRACING: CPT

## 2025-03-20 RX ORDER — GABAPENTIN 100 MG/1
300 CAPSULE ORAL
OUTPATIENT
Start: 2025-03-20

## 2025-03-20 RX ORDER — HYDROCODONE BITARTRATE AND ACETAMINOPHEN 7.5; 325 MG/1; MG/1
1 TABLET ORAL EVERY 4 HOURS PRN
Qty: 42 TABLET | Refills: 0 | Status: SHIPPED | OUTPATIENT
Start: 2025-03-20 | End: 2025-03-27

## 2025-03-20 RX ORDER — ACETAMINOPHEN 500 MG
1000 TABLET ORAL
OUTPATIENT
Start: 2025-03-20

## 2025-03-20 RX ORDER — TRANEXAMIC ACID 650 MG/1
1950 TABLET ORAL
OUTPATIENT
Start: 2025-03-20 | End: 2025-03-20

## 2025-03-20 RX ORDER — SCOPOLAMINE 1 MG/3D
1 PATCH, EXTENDED RELEASE TRANSDERMAL ONCE AS NEEDED
OUTPATIENT
Start: 2025-03-20 | End: 2036-08-15

## 2025-03-20 RX ORDER — KETOROLAC TROMETHAMINE 10 MG/1
10 TABLET, FILM COATED ORAL
OUTPATIENT
Start: 2025-03-20 | End: 2025-03-20

## 2025-03-20 RX ORDER — METHOCARBAMOL 750 MG/1
750 TABLET, FILM COATED ORAL 3 TIMES DAILY
Qty: 42 TABLET | Refills: 0 | Status: SHIPPED | OUTPATIENT
Start: 2025-03-20 | End: 2025-04-03

## 2025-03-20 RX ORDER — TAMSULOSIN HYDROCHLORIDE 0.4 MG/1
0.4 CAPSULE ORAL
OUTPATIENT
Start: 2025-03-20

## 2025-03-20 RX ORDER — SODIUM CHLORIDE, SODIUM GLUCONATE, SODIUM ACETATE, POTASSIUM CHLORIDE AND MAGNESIUM CHLORIDE 30; 37; 368; 526; 502 MG/100ML; MG/100ML; MG/100ML; MG/100ML; MG/100ML
INJECTION, SOLUTION INTRAVENOUS CONTINUOUS
OUTPATIENT
Start: 2025-03-20

## 2025-03-20 RX ORDER — ONDANSETRON 4 MG/1
4 TABLET, ORALLY DISINTEGRATING ORAL
OUTPATIENT
Start: 2025-03-20

## 2025-03-20 NOTE — PROGRESS NOTES
"Chief Complaint:   Chief Complaint   Patient presents with    Pre-op Exam     Patient is here for pre-op lt TKA sx 4/7/25 gl 7/6/25.        History of present illness: Sherry Heredia is a 79 y.o. female. presents to clinic today in regards to her left knee. Patient does have a long known history of osteoarthritis to this knee. She has received a cortisone injection and viscosupplementation injections in the past.  These have failed to relieve her symptoms.  She is having pain throughout the knee.  This has worse with prolonged ambulating any bending.  She has also tried multiple over-the-counter medications with minimal no relief.  His pain affects her daily living activities.  She feels as though she has reached a point of disability we would like to proceed with a left total knee arthroplasty.  Patient does have low tolerance for pain medication in regards to nausea.  We would like to remind anesthesia and postop recovery of her nausea.    Past Medical History:   Diagnosis Date    Age-related nuclear cataract of both eyes 5/23/2022    Chronic idiopathic constipation 5/23/2022    Claustrophobia     " close doors"    Diverticulitis     hx    Dry eyes     Gastroesophageal reflux disease without esophagitis 5/23/2022    [place] (hard of hearing)     Left knee pain     Dr Godwin    Migraine headache 5/23/2022    inpast    Mixed hyperlipidemia 5/23/2022    ASCVD Score = 19.8%     Primary hypertension 5/23/2022    Renal stones     hx renal stones    Right hip pain     Right hip replacement 2019    Wears glasses        Past Surgical History:   Procedure Laterality Date    CHOLECYSTECTOMY      COLONOSCOPY      Dr Love 10/2021    DENTAL SURGERY      HYSTERECTOMY  03/02/2023    JOINT REPLACEMENT      mammogram 07/2021      [facility] center    TRHR      2019       Current Outpatient Medications   Medication Sig    artificial tears ointment (REFRESH P.M.) Oint every evening. Refresh ointment in right eye @ night    " eszopiclone (LUNESTA) 3 mg Tab Take 1 tablet (3 mg total) by mouth nightly as needed (Insomnia).    ezetimibe (ZETIA) 10 mg tablet Take 1 tablet (10 mg total) by mouth once daily.    Lactobacillus acidophilus (PROBIOTIC ACIDOPHILUS ORAL) Take by mouth. Jose-biotic 1 daily    lisinopriL 10 MG tablet Take 1 tablet (10 mg total) by mouth once daily.    multivitamin/iron/folic acid (CENTRUM ORAL) Take by mouth. By mouth daily    omeprazole (PRILOSEC) 40 MG capsule Take 1 capsule (40 mg total) by mouth once daily.    RESTASIS 0.05 % ophthalmic emulsion Place 1 drop into both eyes 2 (two) times daily.    valACYclovir (VALTREX) 1000 MG tablet See Instructions, TAKE TWO TABLETS TWICE DAILY FOR 2 DOSES PRN FOR FEVER BLISTERS, # 10 tab(s), 3 Refill(s), Pharmacy: [facility], 157, cm, Height/Length Dosing, 01/27/22 8:56:00 CST, 51.95, kg, Weight Dosing, 01/27/22 8:56:00 CST    acyclovir 5% (ZOVIRAX) 5 % ointment Apply topically 4 (four) times daily as needed (fever blister). (Patient not taking: Reported on 11/14/2024)    calcium carbonate/vitamin D3 (CALTRATE 600 + D ORAL) Take 1 tablet by mouth once daily at 6am. (Patient not taking: Reported on 11/14/2024)    carbamide peroxide (DEBROX) 6.5 % otic solution Place 5 drops into the right ear 2 (two) times daily as needed (ear wax). (Patient not taking: Reported on 11/14/2024)    cetirizine (ZYRTEC) 10 MG tablet Take 1 tablet (10 mg total) by mouth once daily. (Patient not taking: Reported on 11/14/2024)    conjugated estrogens (PREMARIN) vaginal cream Place 0.5 g vaginally once daily for 14 days, THEN 0.5 g twice a week. (Patient not taking: Reported on 11/14/2024)    denosumab (PROLIA) 60 mg/mL Syrg Inject 1 mL (60 mg total) into the skin every 6 (six) months. (Patient not taking: Reported on 11/14/2024)    erythromycin (ROMYCIN) ophthalmic ointment SMARTSIG:Sparingly In Eye(s) Every Night (Patient not taking: Reported on 11/14/2024)    HYDROcodone-acetaminophen (NORCO)  7.5-325 mg per tablet Take 1 tablet by mouth every 4 (four) hours as needed for Pain.    hydrocortisone 1 % cream Apply topically 2 (two) times daily as needed (fissures, hemorrhoids). (Patient not taking: Reported on 11/14/2024)    hydrOXYzine pamoate (VISTARIL) 25 MG Cap Take 1 capsule (25 mg total) by mouth nightly as needed (nasal congestion). (Patient not taking: Reported on 11/14/2024)    methocarbamoL (ROBAXIN) 750 MG Tab Take 1 tablet (750 mg total) by mouth 3 (three) times daily. for 14 days    montelukast (SINGULAIR) 10 mg tablet Take 1 tablet (10 mg total) by mouth every evening. (Patient not taking: Reported on 11/14/2024)    moxifloxacin (VIGAMOX) 0.5 % ophthalmic solution Place 1 drop into the right eye 4 (four) times daily. (Patient not taking: Reported on 11/14/2024)    mv,Ca,min-iron-FA-lycopene 8 mg iron- 200 mcg-600 mcg Tab Take by mouth. (Patient not taking: Reported on 11/14/2024)    promethazine-dextromethorphan (PROMETHAZINE-DM) 6.25-15 mg/5 mL Syrp Take 5 mLs by mouth every 6 (six) hours as needed (cough). (Patient not taking: Reported on 11/14/2024)     No current facility-administered medications for this visit.       Review of patient's allergies indicates:   Allergen Reactions    Penicillin g benzathine      Other reaction(s): Unknown    Statins-hmg-coa reductase inhibitors Other (See Comments)     Muscle Aches      Sulfamethoxazole      Other reaction(s): itcing, red splotches    Codeine Nausea And Vomiting       Family History   Problem Relation Name Age of Onset    Heart disease Mother      Heart disease Father         Social History[1]        Review of Systems:    Denies fevers, chills, chest pain, shortness of breath. Comprehensive review of systems performed and otherwise negative except as noted in HPI     Physical Examination:    General: awake and alert, no acute distress, healthy appearing  Head and Neck: Head atraumatic/normocephalic. Moist MM  CV: brisk cap refill  Lungs:  non-labored breathing, w/o cough or SOB  Skin: no rashes present, warm to touch  Neuro: sensation grossly intact distally       Vital Signs:    Vitals:    03/20/25 0902   BP: (!) 147/87   Pulse: 80       Body mass index is 20.65 kg/m².    L knee:  Skin warm, dry, intact.  Tenderness to palpation along the lateral aspect of the knee.  Crepitus noted throughout range of motion.  Brisk capillary refill distally.  Sensation intact distally.           Assessment::Primay OA left knee    Plan:  At this point the patient is tried and failed all conservative management with regards to their left knee. They have tried and failed nonoperative management including: Anti-inflammatories, activity modification, injections. All of these have failed to completely remove their pain. They have pain going up and down stairs as well as walking on level ground. The knee pain is affecting activities of daily living They feel that they've reached a point of disability with regards to their knee. X-rays reveal advanced, end-stage degenerative osteoarthritis as noted by subchondral sclerosis, joint space narrowing in the medial, patellofemoral, lateral compartment, and periarticular osteophytes. The patient would like to proceed with surgical intervention and would be a good candidate for total knee replacement.    Total knee arthroplasty procedure, alternatives, risks, and benefits were discussed in detail. The risks including but not limited to: infection, need for revision surgery, pain, swelling, loosening, injury to surrounding neurovascular structures, stiffness, incomplete resolution of pain, DVT, PE, and death were discussed in detail. Despite these risks, the patient would like to proceed with surgical intervention. All questions were answered, no guarantees made. Will plan for left TKA on 4/7/25.    This has patient's minimal medical comorbidities morbidities she is in good same-day discharge candidate.  Patient does want to state  that she is highly nauseated with any type of opioid treatment.  She would like to be treated with mild narcotics or pain medication after surgery.    This note was created using [employer] voice recognition software that occasionally misinterpreted phrases or words.    Consult note is delivered via Epic messaging service.           [1]   Social History  Socioeconomic History    Marital status:    Tobacco Use    Smoking status: Never     Passive exposure: Never    Smokeless tobacco: Never   Substance and Sexual Activity    Alcohol use: Yes     Comment: 1-2 glasses of wine per year    Drug use: Never    Sexual activity: Yes     Partners: Male     Comment:    Social History Narrative    ** Merged History Encounter **         ** Merged History Encounter **          Social Drivers of Health     Financial Resource Strain: Low Risk  (3/25/2024)    [facility]    Overall Financial Resource Strain (CARDIA)     Difficulty of Paying Living Expenses: Not hard at all   Food Insecurity: No Food Insecurity (3/25/2024)    Received from [facility] Aff[facility]ates    Hunger Vital Sign     Worried About Running Out of Food in the Last Year: Never true     Ran Out of Food in the Last Year: Never true   Transportation Needs: No Transportation Needs (3/25/2024)    Received from [facility] and [facility]    PRAPARE - Transportation     Lack of Transportation (Medical): No     Lack of Transportation (Non-Medical): No   Physical Activity: Sufficiently Active (3/25/2024)    Received from [facility] and [facility]    Exercise Vital Sign     Days of Exercise per Week: 7 days     Minutes of Exercise per Session: 150+ min   Stress: No Stress Concern Present (3/25/2024)     Received [facility]idi[facility] and Affiliates    [place] of Occupational Health - Occupational Stress Questionnaire     Feeling of Stress : Not at all   Housing Stability: Low Risk  (5/8/2023)    Housing Stability Vital Sign     Unable to Pay for Housing in the Last Year: No     Number of Places Lived in the Last Year: 1     Unstable Housing in the Last Year: No

## 2025-03-20 NOTE — H&P (VIEW-ONLY)
"Chief Complaint:   Chief Complaint   Patient presents with    Pre-op Exam     Patient is here for pre-op lt TKA sx 4/7/25 gl 7/6/25.        History of present illness: Sherry Heredia is a 79 y.o. female. presents to clinic today in regards to her left knee. Patient does have a long known history of osteoarthritis to this knee. She has received a cortisone injection and viscosupplementation injections in the past.  These have failed to relieve her symptoms.  She is having pain throughout the knee.  This has worse with prolonged ambulating any bending.  She has also tried multiple over-the-counter medications with minimal no relief.  His pain affects her daily living activities.  She feels as though she has reached a point of disability we would like to proceed with a left total knee arthroplasty.  Patient does have low tolerance for pain medication in regards to nausea.  We would like to remind anesthesia and postop recovery of her nausea.    Past Medical History:   Diagnosis Date    Age-related nuclear cataract of both eyes 5/23/2022    Chronic idiopathic constipation 5/23/2022    Claustrophobia     " close doors"    Diverticulitis     hx    Dry eyes     Gastroesophageal reflux disease without esophagitis 5/23/2022    [place] (hard of hearing)     Left knee pain     Dr Godwin    Migraine headache 5/23/2022    inpast    Mixed hyperlipidemia 5/23/2022    ASCVD Score = 19.8%     Primary hypertension 5/23/2022    Renal stones     hx renal stones    Right hip pain     Right hip replacement 2019    Wears glasses        Past Surgical History:   Procedure Laterality Date    CHOLECYSTECTOMY      COLONOSCOPY      Dr Love 10/2021    DENTAL SURGERY      HYSTERECTOMY  03/02/2023    JOINT REPLACEMENT      mammogram 07/2021      [facility]    [facility]      2019       Current Outpatient Medications   Medication Sig    artificial tears ointment (REFRESH P.M.) Oint every evening. Refresh ointment in right eye @ night    " eszopiclone (LUNESTA) 3 mg Tab Take 1 tablet (3 mg total) by mouth nightly as needed (Insomnia).    ezetimibe (ZETIA) 10 mg tablet Take 1 tablet (10 mg total) by mouth once daily.    Lactobacillus acidophilus (PROBIOTIC ACIDOPHILUS ORAL) Take by mouth. Jose-biotic 1 daily    lisinopriL 10 MG tablet Take 1 tablet (10 mg total) by mouth once daily.    multivitamin/iron/folic acid (CENTRUM ORAL) Take by mouth. By mouth daily    omeprazole (PRILOSEC) 40 MG capsule Take 1 capsule (40 mg total) by mouth once daily.    RESTASIS 0.05 % ophthalmic emulsion Place 1 drop into both eyes 2 (two) times daily.    valACYclovir (VALTREX) 1000 MG tablet See Instructions, TAKE TWO TABLETS TWICE DAILY FOR 2 DOSES PRN FOR FEVER BLISTERS, # 10 tab(s), 3 Refill(s), Pharmacy: [facility], 157, cm, Height/Length Dosing, 01/27/22 8:56:00 CST, 51.95, kg, Weight Dosing, 01/27/22 8:56:00 CST    acyclovir 5% (ZOVIRAX) 5 % ointment Apply topically 4 (four) times daily as needed (fever blister). (Patient not taking: Reported on 11/14/2024)    calcium carbonate/vitamin D3 (CALTRATE 600 + D ORAL) Take 1 tablet by mouth once daily at 6am. (Patient not taking: Reported on 11/14/2024)    carbamide peroxide (DEBROX) 6.5 % otic solution Place 5 drops into the right ear 2 (two) times daily as needed (ear wax). (Patient not taking: Reported on 11/14/2024)    cetirizine (ZYRTEC) 10 MG tablet Take 1 tablet (10 mg total) by mouth once daily. (Patient not taking: Reported on 11/14/2024)    conjugated estrogens (PREMARIN) vaginal cream Place 0.5 g vaginally once daily for 14 days, THEN 0.5 g twice a week. (Patient not taking: Reported on 11/14/2024)    denosumab (PROLIA) 60 mg/mL Syrg Inject 1 mL (60 mg total) into the skin every 6 (six) months. (Patient not taking: Reported on 11/14/2024)    erythromycin (ROMYCIN) ophthalmic ointment SMARTSIG:Sparingly In Eye(s) Every Night (Patient not taking: Reported on 11/14/2024)    HYDROcodone-acetaminophen (NORCO)  7.5-325 mg per tablet Take 1 tablet by mouth every 4 (four) hours as needed for Pain.    hydrocortisone 1 % cream Apply topically 2 (two) times daily as needed (fissures, hemorrhoids). (Patient not taking: Reported on 11/14/2024)    hydrOXYzine pamoate (VISTARIL) 25 MG Cap Take 1 capsule (25 mg total) by mouth nightly as needed (nasal congestion). (Patient not taking: Reported on 11/14/2024)    methocarbamoL (ROBAXIN) 750 MG Tab Take 1 tablet (750 mg total) by mouth 3 (three) times daily. for 14 days    montelukast (SINGULAIR) 10 mg tablet Take 1 tablet (10 mg total) by mouth every evening. (Patient not taking: Reported on 11/14/2024)    moxifloxacin (VIGAMOX) 0.5 % ophthalmic solution Place 1 drop into the right eye 4 (four) times daily. (Patient not taking: Reported on 11/14/2024)    mv,Ca,min-iron-FA-lycopene 8 mg iron- 200 mcg-600 mcg Tab Take by mouth. (Patient not taking: Reported on 11/14/2024)    promethazine-dextromethorphan (PROMETHAZINE-DM) 6.25-15 mg/5 mL Syrp Take 5 mLs by mouth every 6 (six) hours as needed (cough). (Patient not taking: Reported on 11/14/2024)     No current facility-administered medications for this visit.       Review of patient's allergies indicates:   Allergen Reactions    Penicillin g benzathine      Other reaction(s): Unknown    Statins-hmg-coa reductase inhibitors Other (See Comments)     Muscle Aches      Sulfamethoxazole      Other reaction(s): itcing, red splotches    Codeine Nausea And Vomiting       Family History   Problem Relation Name Age of Onset    Heart disease Mother      Heart disease Father         Social History[1]        Review of Systems:    Denies fevers, chills, chest pain, shortness of breath. Comprehensive review of systems performed and otherwise negative except as noted in HPI     Physical Examination:    General: awake and alert, no acute distress, healthy appearing  Head and Neck: Head atraumatic/normocephalic. Moist MM  CV: brisk cap refill  Lungs:  non-labored breathing, w/o cough or SOB  Skin: no rashes present, warm to touch  Neuro: sensation grossly intact distally       Vital Signs:    Vitals:    03/20/25 0902   BP: (!) 147/87   Pulse: 80       Body mass index is 20.65 kg/m².    L knee:  Skin warm, dry, intact.  Tenderness to palpation along the lateral aspect of the knee.  Crepitus noted throughout range of motion.  Brisk capillary refill distally.  Sensation intact distally.           Assessment::Primay OA left knee    Plan:  At this point the patient is tried and failed all conservative management with regards to their left knee. They have tried and failed nonoperative management including: Anti-inflammatories, activity modification, injections. All of these have failed to completely remove their pain. They have pain going up and down stairs as well as walking on level ground. The knee pain is affecting activities of daily living They feel that they've reached a point of disability with regards to their knee. X-rays reveal advanced, end-stage degenerative osteoarthritis as noted by subchondral sclerosis, joint space narrowing in the medial, patellofemoral, lateral compartment, and periarticular osteophytes. The patient would like to proceed with surgical intervention and would be a good candidate for total knee replacement.    Total knee arthroplasty procedure, alternatives, risks, and benefits were discussed in detail. The risks including but not limited to: infection, need for revision surgery, pain, swelling, loosening, injury to surrounding neurovascular structures, stiffness, incomplete resolution of pain, DVT, PE, and death were discussed in detail. Despite these risks, the patient would like to proceed with surgical intervention. All questions were answered, no guarantees made. Will plan for left TKA on 4/7/25.    This has patient's minimal medical comorbidities morbidities she is in good same-day discharge candidate.  Patient does want to state  that she is highly nauseated with any type of opioid treatment.  She would like to be treated with mild narcotics or pain medication after surgery.    This note was created using WHOOP voice recognition software that occasionally misinterpreted phrases or words.    Consult note is delivered via Epic messaging service.           [1]   Social History  Socioeconomic History    Marital status:    Tobacco Use    Smoking status: Never     Passive exposure: Never    Smokeless tobacco: Never   Substance and Sexual Activity    Alcohol use: Yes     Comment: 1-2 glasses of wine per year    Drug use: Never    Sexual activity: Yes     Partners: Male     Comment:    Social History Narrative    ** Merged History Encounter **         ** Merged History Encounter **          Social Drivers of Health     Financial Resource Strain: Low Risk  (3/25/2024)    [facility]    Overall Financial Resource Strain (CARDIA)     Difficulty of Paying Living Expenses: Not hard at all   Food Insecurity: No Food Insecurity (3/25/2024)    Received from [facility] Subsidi[facility] and Affiliates    Hunger Vital Sign     Worried About Running Out of Food in the Last Year: Never true     Ran Out of Food in the Last Year: Never true   Transportation Needs: No Transportation Needs (3/25/2024)    Received from [facility] Subsidi[facility] and Affiliates    PRAPARE - Transportation     Lack of Transportation (Medical): No     Lack of Transportation (Non-Medical): No   Physical Activity: Sufficiently Active (3/25/2024)    Received from [facility] Subsidi[facility] and Affiliates    Exercise Vital Sign     Days of Exercise per Week: 7 days     Minutes of Exercise per Session: 150+ min   Stress: No Stress Concern Present (3/25/2024)     Received from [facility]sidi[facility] and Affiliates    [employer] - Occupational Stress Questionnaire     Feeling of Stress : Not at all   Housing Stability: Low Risk  (5/8/2023)    Housing Stability Vital Sign     Unable to Pay for Housing in the Last Year: No     Number of Places Lived in the Last Year: 1     Unstable Housing in the Last Year: No

## 2025-03-25 ENCOUNTER — ANESTHESIA EVENT (OUTPATIENT)
Dept: SURGERY | Facility: HOSPITAL | Age: 80
End: 2025-03-25
Payer: MEDICARE

## 2025-03-26 ENCOUNTER — TELEPHONE (OUTPATIENT)
Dept: ORTHOPEDICS | Facility: CLINIC | Age: 80
End: 2025-03-26
Payer: MEDICARE

## 2025-03-26 NOTE — TELEPHONE ENCOUNTER
Janet with [facility] called and lvm requesting visit note be sent due to patient wanting to start post-op pt there and patients insurance requires pre-authorization.    Faxed over requested documents and notified janet of this. States she will call the patient to schedule first pt appt for 2-3 days post op.

## 2025-03-27 ENCOUNTER — TELEPHONE (OUTPATIENT)
Dept: ORTHOPEDICS | Facility: CLINIC | Age: 80
End: 2025-03-27
Payer: MEDICARE

## 2025-03-27 NOTE — TELEPHONE ENCOUNTER
Contacted Dr. Dewey office to request med recs and CRA. Informed them that we received notes and testing but not a clear CRA and med recs.  states she will inform them of this and have it faxed over.

## 2025-03-31 DIAGNOSIS — Z47.1 AFTERCARE FOLLOWING LEFT KNEE JOINT REPLACEMENT SURGERY: Primary | ICD-10-CM

## 2025-03-31 DIAGNOSIS — Z96.652 AFTERCARE FOLLOWING LEFT KNEE JOINT REPLACEMENT SURGERY: Primary | ICD-10-CM

## 2025-03-31 RX ORDER — ONDANSETRON 4 MG/1
4 TABLET, ORALLY DISINTEGRATING ORAL EVERY 6 HOURS PRN
Qty: 30 TABLET | Refills: 1 | Status: SHIPPED | OUTPATIENT
Start: 2025-03-31 | End: 2025-04-15

## 2025-03-31 RX ORDER — TRAMADOL HYDROCHLORIDE 50 MG/1
50 TABLET ORAL
Qty: 42 TABLET | Refills: 0 | Status: SHIPPED | OUTPATIENT
Start: 2025-03-31 | End: 2025-04-07

## 2025-04-02 ENCOUNTER — TELEPHONE (OUTPATIENT)
Dept: ORTHOPEDICS | Facility: CLINIC | Age: 80
End: 2025-04-02
Payer: MEDICARE

## 2025-04-02 NOTE — TELEPHONE ENCOUNTER
Patient called requesting pt order be faxed to [facility].   Faxed order per patients request (see routing history).   Patient notified and voiced a clear understanding.

## 2025-04-04 NOTE — PLAN OF CARE
SDD. Scheduled for TKR on 4/7. Spk w pt's  ( [place] [phone])--  to asst w homecare. Pt has RW from previous hip surg. Requesting [facility] ( CHRISSY Ramirez) for outpt therapy. Joseph scheduled for Thur 4/10.

## 2025-04-07 ENCOUNTER — ANESTHESIA (OUTPATIENT)
Dept: SURGERY | Facility: HOSPITAL | Age: 80
End: 2025-04-07
Payer: MEDICARE

## 2025-04-07 ENCOUNTER — HOSPITAL ENCOUNTER (OUTPATIENT)
Facility: HOSPITAL | Age: 80
Discharge: HOME | End: 2025-04-08
Attending: ORTHOPAEDIC SURGERY | Admitting: ORTHOPAEDIC SURGERY
Payer: MEDICARE

## 2025-04-07 DIAGNOSIS — M17.12 PRIMARY OSTEOARTHRITIS OF LEFT KNEE: ICD-10-CM

## 2025-04-07 DIAGNOSIS — M19.90 OSTEOARTHRITIS: ICD-10-CM

## 2025-04-07 DIAGNOSIS — Z01.818 PREOPERATIVE TESTING: ICD-10-CM

## 2025-04-07 DIAGNOSIS — R79.9 ABNORMAL BLOOD CHEMISTRY LEVEL: ICD-10-CM

## 2025-04-07 LAB
POCT GLUCOSE: 108 MG/DL (ref 70–110)
POCT GLUCOSE: 109 MG/DL (ref 70–110)

## 2025-04-07 PROCEDURE — 27447 TOTAL KNEE ARTHROPLASTY: CPT | Mod: LT,,, | Performed by: ORTHOPAEDIC SURGERY

## 2025-04-07 PROCEDURE — 25000003 PHARM REV CODE 250: Performed by: NURSE ANESTHETIST, CERTIFIED REGISTERED

## 2025-04-07 PROCEDURE — C1713 ANCHOR/SCREW BN/BN,TIS/BN: HCPCS | Performed by: ORTHOPAEDIC SURGERY

## 2025-04-07 PROCEDURE — 63600175 PHARM REV CODE 636 W HCPCS: Performed by: ANESTHESIOLOGY

## 2025-04-07 PROCEDURE — 63600175 PHARM REV CODE 636 W HCPCS: Performed by: NURSE ANESTHETIST, CERTIFIED REGISTERED

## 2025-04-07 PROCEDURE — A4216 STERILE WATER/SALINE, 10 ML: HCPCS | Performed by: ORTHOPAEDIC SURGERY

## 2025-04-07 PROCEDURE — 37000009 HC ANESTHESIA EA ADD 15 MINS: Performed by: ORTHOPAEDIC SURGERY

## 2025-04-07 PROCEDURE — 27000221 HC OXYGEN, UP TO 24 HOURS

## 2025-04-07 PROCEDURE — 71000015 HC POSTOP RECOV 1ST HR: Performed by: ORTHOPAEDIC SURGERY

## 2025-04-07 PROCEDURE — 25000003 PHARM REV CODE 250: Performed by: ORTHOPAEDIC SURGERY

## 2025-04-07 PROCEDURE — 63600175 PHARM REV CODE 636 W HCPCS: Performed by: NURSE PRACTITIONER

## 2025-04-07 PROCEDURE — 25000003 PHARM REV CODE 250: Performed by: NURSE PRACTITIONER

## 2025-04-07 PROCEDURE — D9220A PRA ANESTHESIA: Mod: ANES,,, | Performed by: ANESTHESIOLOGY

## 2025-04-07 PROCEDURE — 64447 NJX AA&/STRD FEMORAL NRV IMG: CPT | Performed by: ANESTHESIOLOGY

## 2025-04-07 PROCEDURE — 27201423 OPTIME MED/SURG SUP & DEVICES STERILE SUPPLY: Performed by: ORTHOPAEDIC SURGERY

## 2025-04-07 PROCEDURE — 99900031 HC PATIENT EDUCATION (STAT)

## 2025-04-07 PROCEDURE — 94761 N-INVAS EAR/PLS OXIMETRY MLT: CPT

## 2025-04-07 PROCEDURE — 94799 UNLISTED PULMONARY SVC/PX: CPT

## 2025-04-07 PROCEDURE — D9220A PRA ANESTHESIA: Mod: CRNA,,, | Performed by: NURSE ANESTHETIST, CERTIFIED REGISTERED

## 2025-04-07 PROCEDURE — 63600175 PHARM REV CODE 636 W HCPCS

## 2025-04-07 PROCEDURE — 97162 PT EVAL MOD COMPLEX 30 MIN: CPT

## 2025-04-07 PROCEDURE — 71000033 HC RECOVERY, INTIAL HOUR: Performed by: ORTHOPAEDIC SURGERY

## 2025-04-07 PROCEDURE — 63600175 PHARM REV CODE 636 W HCPCS: Mod: JZ,TB | Performed by: ANESTHESIOLOGY

## 2025-04-07 PROCEDURE — 63600175 PHARM REV CODE 636 W HCPCS: Performed by: ORTHOPAEDIC SURGERY

## 2025-04-07 PROCEDURE — 25000003 PHARM REV CODE 250: Performed by: ANESTHESIOLOGY

## 2025-04-07 PROCEDURE — 82962 GLUCOSE BLOOD TEST: CPT | Performed by: ORTHOPAEDIC SURGERY

## 2025-04-07 PROCEDURE — 36000712 HC OR TIME LEV V 1ST 15 MIN: Performed by: ORTHOPAEDIC SURGERY

## 2025-04-07 PROCEDURE — 37000008 HC ANESTHESIA 1ST 15 MINUTES: Performed by: ORTHOPAEDIC SURGERY

## 2025-04-07 PROCEDURE — 71000016 HC POSTOP RECOV ADDL HR: Performed by: ORTHOPAEDIC SURGERY

## 2025-04-07 PROCEDURE — 36000713 HC OR TIME LEV V EA ADD 15 MIN: Performed by: ORTHOPAEDIC SURGERY

## 2025-04-07 PROCEDURE — 97116 GAIT TRAINING THERAPY: CPT

## 2025-04-07 PROCEDURE — 0055T BONE SRGRY CMPTR CT/MRI IMAG: CPT | Mod: ,,, | Performed by: ORTHOPAEDIC SURGERY

## 2025-04-07 PROCEDURE — C1776 JOINT DEVICE (IMPLANTABLE): HCPCS | Performed by: ORTHOPAEDIC SURGERY

## 2025-04-07 PROCEDURE — 64447 NJX AA&/STRD FEMORAL NRV IMG: CPT | Mod: 59,LT,, | Performed by: ANESTHESIOLOGY

## 2025-04-07 PROCEDURE — G0378 HOSPITAL OBSERVATION PER HR: HCPCS

## 2025-04-07 DEVICE — TIBIAL BEARING INSERT - CR
Type: IMPLANTABLE DEVICE | Site: KNEE | Status: FUNCTIONAL
Brand: TRIATHLON

## 2025-04-07 DEVICE — TIBIAL COMPONENT
Type: IMPLANTABLE DEVICE | Site: KNEE | Status: FUNCTIONAL
Brand: TRIATHLON

## 2025-04-07 DEVICE — CRUCIATE RETAINING FEMORAL
Type: IMPLANTABLE DEVICE | Site: KNEE | Status: FUNCTIONAL
Brand: TRIATHLON

## 2025-04-07 RX ORDER — SODIUM CHLORIDE, SODIUM LACTATE, POTASSIUM CHLORIDE, CALCIUM CHLORIDE 600; 310; 30; 20 MG/100ML; MG/100ML; MG/100ML; MG/100ML
INJECTION, SOLUTION INTRAVENOUS CONTINUOUS
OUTPATIENT
Start: 2025-04-07

## 2025-04-07 RX ORDER — DOCUSATE SODIUM 100 MG/1
200 CAPSULE, LIQUID FILLED ORAL
Status: DISCONTINUED | OUTPATIENT
Start: 2025-04-08 | End: 2025-04-08 | Stop reason: HOSPADM

## 2025-04-07 RX ORDER — SODIUM CHLORIDE 9 MG/ML
INJECTION, SOLUTION INTRAMUSCULAR; INTRAVENOUS; SUBCUTANEOUS
Status: DISCONTINUED | OUTPATIENT
Start: 2025-04-07 | End: 2025-04-07 | Stop reason: HOSPADM

## 2025-04-07 RX ORDER — HYDROCODONE BITARTRATE AND ACETAMINOPHEN 5; 325 MG/1; MG/1
1 TABLET ORAL
Refills: 0 | OUTPATIENT
Start: 2025-04-07

## 2025-04-07 RX ORDER — METHOCARBAMOL 100 MG/ML
1000 INJECTION, SOLUTION INTRAMUSCULAR; INTRAVENOUS ONCE AS NEEDED
Status: DISCONTINUED | OUTPATIENT
Start: 2025-04-07 | End: 2025-04-07 | Stop reason: HOSPADM

## 2025-04-07 RX ORDER — HYDROCODONE BITARTRATE AND ACETAMINOPHEN 7.5; 325 MG/1; MG/1
1 TABLET ORAL EVERY 4 HOURS PRN
Refills: 0 | Status: DISCONTINUED | OUTPATIENT
Start: 2025-04-07 | End: 2025-04-07

## 2025-04-07 RX ORDER — KETOROLAC TROMETHAMINE 10 MG/1
10 TABLET, FILM COATED ORAL
Status: COMPLETED | OUTPATIENT
Start: 2025-04-07 | End: 2025-04-07

## 2025-04-07 RX ORDER — NAPROXEN SODIUM 220 MG/1
81 TABLET, FILM COATED ORAL 2 TIMES DAILY
Status: DISCONTINUED | OUTPATIENT
Start: 2025-04-08 | End: 2025-04-08 | Stop reason: HOSPADM

## 2025-04-07 RX ORDER — SODIUM CHLORIDE 0.9 % (FLUSH) 0.9 %
SYRINGE (ML) INJECTION
Status: DISPENSED
Start: 2025-04-07 | End: 2025-04-07

## 2025-04-07 RX ORDER — SODIUM CITRATE AND CITRIC ACID MONOHYDRATE 334; 500 MG/5ML; MG/5ML
SOLUTION ORAL
Status: DISPENSED
Start: 2025-04-07 | End: 2025-04-07

## 2025-04-07 RX ORDER — BISACODYL 10 MG/1
10 SUPPOSITORY RECTAL DAILY PRN
Status: DISCONTINUED | OUTPATIENT
Start: 2025-04-07 | End: 2025-04-08 | Stop reason: HOSPADM

## 2025-04-07 RX ORDER — MORPHINE SULFATE 10 MG/ML
INJECTION INTRAMUSCULAR; INTRAVENOUS; SUBCUTANEOUS
Status: DISPENSED
Start: 2025-04-07 | End: 2025-04-07

## 2025-04-07 RX ORDER — TRANEXAMIC ACID 650 MG/1
1950 TABLET ORAL
Status: COMPLETED | OUTPATIENT
Start: 2025-04-07 | End: 2025-04-07

## 2025-04-07 RX ORDER — PHENYLEPHRINE HCL IN 0.9% NACL 1 MG/10 ML
SYRINGE (ML) INTRAVENOUS
Status: DISCONTINUED | OUTPATIENT
Start: 2025-04-07 | End: 2025-04-07

## 2025-04-07 RX ORDER — LISINOPRIL 10 MG/1
10 TABLET ORAL DAILY
Status: DISCONTINUED | OUTPATIENT
Start: 2025-04-08 | End: 2025-04-08 | Stop reason: HOSPADM

## 2025-04-07 RX ORDER — ONDANSETRON HYDROCHLORIDE 2 MG/ML
INJECTION, SOLUTION INTRAVENOUS
Status: DISCONTINUED | OUTPATIENT
Start: 2025-04-07 | End: 2025-04-07

## 2025-04-07 RX ORDER — KETOROLAC TROMETHAMINE 10 MG/1
10 TABLET, FILM COATED ORAL EVERY 6 HOURS
Status: DISCONTINUED | OUTPATIENT
Start: 2025-04-07 | End: 2025-04-07

## 2025-04-07 RX ORDER — VANCOMYCIN HYDROCHLORIDE 1 G/20ML
INJECTION, POWDER, LYOPHILIZED, FOR SOLUTION INTRAVENOUS
Status: DISPENSED
Start: 2025-04-07 | End: 2025-04-07

## 2025-04-07 RX ORDER — SODIUM CHLORIDE 9 MG/ML
INJECTION, SOLUTION INTRAVENOUS CONTINUOUS
Status: DISCONTINUED | OUTPATIENT
Start: 2025-04-07 | End: 2025-04-08 | Stop reason: HOSPADM

## 2025-04-07 RX ORDER — METOCLOPRAMIDE HYDROCHLORIDE 5 MG/ML
10 INJECTION INTRAMUSCULAR; INTRAVENOUS
Status: COMPLETED | OUTPATIENT
Start: 2025-04-07 | End: 2025-04-07

## 2025-04-07 RX ORDER — BUPIVACAINE HYDROCHLORIDE 2.5 MG/ML
INJECTION, SOLUTION EPIDURAL; INFILTRATION; INTRACAUDAL; PERINEURAL
Status: DISCONTINUED | OUTPATIENT
Start: 2025-04-07 | End: 2025-04-07

## 2025-04-07 RX ORDER — CEFAZOLIN 2 G/1
2 INJECTION, POWDER, FOR SOLUTION INTRAMUSCULAR; INTRAVENOUS
Status: COMPLETED | OUTPATIENT
Start: 2025-04-07 | End: 2025-04-07

## 2025-04-07 RX ORDER — BUPIVACAINE 13.3 MG/ML
10 INJECTION, SUSPENSION, LIPOSOMAL INFILTRATION ONCE
Status: COMPLETED | OUTPATIENT
Start: 2025-04-07 | End: 2025-04-07

## 2025-04-07 RX ORDER — ONDANSETRON HYDROCHLORIDE 2 MG/ML
INJECTION, SOLUTION INTRAVENOUS
Status: COMPLETED
Start: 2025-04-07 | End: 2025-04-07

## 2025-04-07 RX ORDER — DEXAMETHASONE SODIUM PHOSPHATE 4 MG/ML
4 INJECTION, SOLUTION INTRA-ARTICULAR; INTRALESIONAL; INTRAMUSCULAR; INTRAVENOUS; SOFT TISSUE ONCE AS NEEDED
Status: COMPLETED | OUTPATIENT
Start: 2025-04-07 | End: 2025-04-07

## 2025-04-07 RX ORDER — ONDANSETRON HYDROCHLORIDE 2 MG/ML
4 INJECTION, SOLUTION INTRAVENOUS EVERY 6 HOURS PRN
Status: DISCONTINUED | OUTPATIENT
Start: 2025-04-07 | End: 2025-04-08 | Stop reason: HOSPADM

## 2025-04-07 RX ORDER — VANCOMYCIN HYDROCHLORIDE 1 G/20ML
INJECTION, POWDER, LYOPHILIZED, FOR SOLUTION INTRAVENOUS
Status: DISCONTINUED | OUTPATIENT
Start: 2025-04-07 | End: 2025-04-07 | Stop reason: HOSPADM

## 2025-04-07 RX ORDER — GABAPENTIN 300 MG/1
300 CAPSULE ORAL
Status: COMPLETED | OUTPATIENT
Start: 2025-04-07 | End: 2025-04-07

## 2025-04-07 RX ORDER — PROPOFOL 10 MG/ML
VIAL (ML) INTRAVENOUS
Status: DISCONTINUED | OUTPATIENT
Start: 2025-04-07 | End: 2025-04-07

## 2025-04-07 RX ORDER — ACETAMINOPHEN 500 MG
1000 TABLET ORAL
Status: COMPLETED | OUTPATIENT
Start: 2025-04-07 | End: 2025-04-07

## 2025-04-07 RX ORDER — ADHESIVE BANDAGE
30 BANDAGE TOPICAL DAILY PRN
Status: DISCONTINUED | OUTPATIENT
Start: 2025-04-07 | End: 2025-04-08 | Stop reason: HOSPADM

## 2025-04-07 RX ORDER — BUPIVACAINE HYDROCHLORIDE 2.5 MG/ML
30 INJECTION, SOLUTION EPIDURAL; INFILTRATION; INTRACAUDAL; PERINEURAL ONCE
Status: DISCONTINUED | OUTPATIENT
Start: 2025-04-07 | End: 2025-04-07 | Stop reason: HOSPADM

## 2025-04-07 RX ORDER — PROCHLORPERAZINE EDISYLATE 5 MG/ML
5 INJECTION INTRAMUSCULAR; INTRAVENOUS ONCE AS NEEDED
Status: COMPLETED | OUTPATIENT
Start: 2025-04-07 | End: 2025-04-07

## 2025-04-07 RX ORDER — POLYETHYLENE GLYCOL 3350 17 G/17G
17 POWDER, FOR SOLUTION ORAL NIGHTLY
Status: DISCONTINUED | OUTPATIENT
Start: 2025-04-07 | End: 2025-04-08 | Stop reason: HOSPADM

## 2025-04-07 RX ORDER — MIDAZOLAM HYDROCHLORIDE 1 MG/ML
INJECTION INTRAMUSCULAR; INTRAVENOUS
Status: DISCONTINUED | OUTPATIENT
Start: 2025-04-07 | End: 2025-04-07

## 2025-04-07 RX ORDER — ONDANSETRON 4 MG/1
4 TABLET, ORALLY DISINTEGRATING ORAL
Status: COMPLETED | OUTPATIENT
Start: 2025-04-07 | End: 2025-04-07

## 2025-04-07 RX ORDER — AMOXICILLIN 250 MG
2 CAPSULE ORAL 2 TIMES DAILY
Status: DISCONTINUED | OUTPATIENT
Start: 2025-04-07 | End: 2025-04-08 | Stop reason: HOSPADM

## 2025-04-07 RX ORDER — ROPIVACAINE HYDROCHLORIDE 5 MG/ML
INJECTION, SOLUTION EPIDURAL; INFILTRATION; PERINEURAL
Status: DISPENSED
Start: 2025-04-07 | End: 2025-04-07

## 2025-04-07 RX ORDER — KETOROLAC TROMETHAMINE 30 MG/ML
INJECTION, SOLUTION INTRAMUSCULAR; INTRAVENOUS
Status: DISCONTINUED | OUTPATIENT
Start: 2025-04-07 | End: 2025-04-07 | Stop reason: HOSPADM

## 2025-04-07 RX ORDER — TALC
6 POWDER (GRAM) TOPICAL NIGHTLY PRN
Status: DISCONTINUED | OUTPATIENT
Start: 2025-04-07 | End: 2025-04-08 | Stop reason: HOSPADM

## 2025-04-07 RX ORDER — ONDANSETRON 4 MG/1
4 TABLET, ORALLY DISINTEGRATING ORAL
Status: DISCONTINUED | OUTPATIENT
Start: 2025-04-07 | End: 2025-04-07 | Stop reason: HOSPADM

## 2025-04-07 RX ORDER — EPINEPHRINE 1 MG/ML
INJECTION, SOLUTION, CONCENTRATE INTRAVENOUS
Status: DISPENSED
Start: 2025-04-07 | End: 2025-04-07

## 2025-04-07 RX ORDER — PROCHLORPERAZINE EDISYLATE 5 MG/ML
5 INJECTION INTRAMUSCULAR; INTRAVENOUS EVERY 30 MIN PRN
Status: DISCONTINUED | OUTPATIENT
Start: 2025-04-07 | End: 2025-04-07 | Stop reason: HOSPADM

## 2025-04-07 RX ORDER — CEFAZOLIN 2 G/1
2 INJECTION, POWDER, FOR SOLUTION INTRAMUSCULAR; INTRAVENOUS
Status: DISCONTINUED | OUTPATIENT
Start: 2025-04-07 | End: 2025-04-07

## 2025-04-07 RX ORDER — KETOROLAC TROMETHAMINE 10 MG/1
10 TABLET, FILM COATED ORAL
Status: DISCONTINUED | OUTPATIENT
Start: 2025-04-07 | End: 2025-04-08 | Stop reason: HOSPADM

## 2025-04-07 RX ORDER — BUPIVACAINE HYDROCHLORIDE 2.5 MG/ML
INJECTION, SOLUTION EPIDURAL; INFILTRATION; INTRACAUDAL; PERINEURAL
Status: COMPLETED
Start: 2025-04-07 | End: 2025-04-07

## 2025-04-07 RX ORDER — ONDANSETRON HYDROCHLORIDE 2 MG/ML
4 INJECTION, SOLUTION INTRAVENOUS ONCE
Status: COMPLETED | OUTPATIENT
Start: 2025-04-07 | End: 2025-04-07

## 2025-04-07 RX ORDER — HYDROCODONE BITARTRATE AND ACETAMINOPHEN 5; 325 MG/1; MG/1
1 TABLET ORAL EVERY 4 HOURS PRN
Refills: 0 | Status: DISCONTINUED | OUTPATIENT
Start: 2025-04-07 | End: 2025-04-08

## 2025-04-07 RX ORDER — METHOCARBAMOL 750 MG/1
750 TABLET, FILM COATED ORAL EVERY 8 HOURS PRN
Status: DISCONTINUED | OUTPATIENT
Start: 2025-04-07 | End: 2025-04-08 | Stop reason: HOSPADM

## 2025-04-07 RX ORDER — MIDAZOLAM HYDROCHLORIDE 2 MG/2ML
.5-4 INJECTION, SOLUTION INTRAMUSCULAR; INTRAVENOUS
OUTPATIENT
Start: 2025-04-07

## 2025-04-07 RX ORDER — CEFAZOLIN 2 G/1
2 INJECTION, POWDER, FOR SOLUTION INTRAMUSCULAR; INTRAVENOUS
Status: COMPLETED | OUTPATIENT
Start: 2025-04-07 | End: 2025-04-08

## 2025-04-07 RX ORDER — MORPHINE SULFATE 10 MG/ML
INJECTION INTRAMUSCULAR; INTRAVENOUS; SUBCUTANEOUS
Status: DISCONTINUED | OUTPATIENT
Start: 2025-04-07 | End: 2025-04-07 | Stop reason: HOSPADM

## 2025-04-07 RX ORDER — BUPIVACAINE 13.3 MG/ML
INJECTION, SUSPENSION, LIPOSOMAL INFILTRATION
Status: COMPLETED
Start: 2025-04-07 | End: 2025-04-07

## 2025-04-07 RX ORDER — HYDROCODONE BITARTRATE AND ACETAMINOPHEN 5; 325 MG/1; MG/1
1 TABLET ORAL EVERY 4 HOURS PRN
Status: DISCONTINUED | OUTPATIENT
Start: 2025-04-07 | End: 2025-04-07

## 2025-04-07 RX ORDER — ROPIVACAINE HYDROCHLORIDE 5 MG/ML
INJECTION, SOLUTION EPIDURAL; INFILTRATION; PERINEURAL
Status: DISCONTINUED | OUTPATIENT
Start: 2025-04-07 | End: 2025-04-07 | Stop reason: HOSPADM

## 2025-04-07 RX ORDER — SODIUM CHLORIDE, SODIUM GLUCONATE, SODIUM ACETATE, POTASSIUM CHLORIDE AND MAGNESIUM CHLORIDE 30; 37; 368; 526; 502 MG/100ML; MG/100ML; MG/100ML; MG/100ML; MG/100ML
INJECTION, SOLUTION INTRAVENOUS CONTINUOUS
Status: DISCONTINUED | OUTPATIENT
Start: 2025-04-07 | End: 2025-04-07

## 2025-04-07 RX ORDER — EPINEPHRINE 1 MG/ML
INJECTION, SOLUTION, CONCENTRATE INTRAVENOUS
Status: DISCONTINUED | OUTPATIENT
Start: 2025-04-07 | End: 2025-04-07 | Stop reason: HOSPADM

## 2025-04-07 RX ORDER — TRAMADOL HYDROCHLORIDE 50 MG/1
50 TABLET ORAL EVERY 4 HOURS PRN
Status: DISCONTINUED | OUTPATIENT
Start: 2025-04-07 | End: 2025-04-08

## 2025-04-07 RX ORDER — SCOPOLAMINE 1 MG/3D
1 PATCH, EXTENDED RELEASE TRANSDERMAL ONCE AS NEEDED
Status: DISCONTINUED | OUTPATIENT
Start: 2025-04-07 | End: 2025-04-07 | Stop reason: HOSPADM

## 2025-04-07 RX ORDER — BUPIVACAINE HYDROCHLORIDE 7.5 MG/ML
INJECTION, SOLUTION EPIDURAL; RETROBULBAR
Status: COMPLETED | OUTPATIENT
Start: 2025-04-07 | End: 2025-04-07

## 2025-04-07 RX ORDER — HALOPERIDOL LACTATE 5 MG/ML
0.5 INJECTION, SOLUTION INTRAMUSCULAR EVERY 10 MIN PRN
Status: DISCONTINUED | OUTPATIENT
Start: 2025-04-07 | End: 2025-04-07 | Stop reason: HOSPADM

## 2025-04-07 RX ORDER — SODIUM CITRATE AND CITRIC ACID MONOHYDRATE 334; 500 MG/5ML; MG/5ML
30 SOLUTION ORAL ONCE
Status: COMPLETED | OUTPATIENT
Start: 2025-04-07 | End: 2025-04-07

## 2025-04-07 RX ORDER — TAMSULOSIN HYDROCHLORIDE 0.4 MG/1
0.4 CAPSULE ORAL
Status: DISCONTINUED | OUTPATIENT
Start: 2025-04-07 | End: 2025-04-07 | Stop reason: HOSPADM

## 2025-04-07 RX ORDER — FAMOTIDINE 20 MG/1
20 TABLET, FILM COATED ORAL
Status: DISCONTINUED | OUTPATIENT
Start: 2025-04-08 | End: 2025-04-08 | Stop reason: HOSPADM

## 2025-04-07 RX ORDER — GLYCOPYRROLATE 0.2 MG/ML
INJECTION INTRAMUSCULAR; INTRAVENOUS
Status: DISCONTINUED | OUTPATIENT
Start: 2025-04-07 | End: 2025-04-07

## 2025-04-07 RX ORDER — MORPHINE SULFATE 4 MG/ML
4 INJECTION, SOLUTION INTRAMUSCULAR; INTRAVENOUS EVERY 5 MIN PRN
Status: DISCONTINUED | OUTPATIENT
Start: 2025-04-07 | End: 2025-04-07 | Stop reason: HOSPADM

## 2025-04-07 RX ADMIN — Medication 100 MCG: at 08:04

## 2025-04-07 RX ADMIN — TRANEXAMIC ACID 1950 MG: 650 TABLET ORAL at 07:04

## 2025-04-07 RX ADMIN — GABAPENTIN 300 MG: 300 CAPSULE ORAL at 07:04

## 2025-04-07 RX ADMIN — ONDANSETRON 4 MG: 4 TABLET, ORALLY DISINTEGRATING ORAL at 07:04

## 2025-04-07 RX ADMIN — PROCHLORPERAZINE EDISYLATE 5 MG: 5 INJECTION INTRAMUSCULAR; INTRAVENOUS at 12:04

## 2025-04-07 RX ADMIN — ONDANSETRON 4 MG: 2 INJECTION INTRAMUSCULAR; INTRAVENOUS at 09:04

## 2025-04-07 RX ADMIN — BUPIVACAINE 10 ML: 13.3 INJECTION, SUSPENSION, LIPOSOMAL INFILTRATION at 09:04

## 2025-04-07 RX ADMIN — GLYCOPYRROLATE 0.1 MG: 0.2 INJECTION INTRAMUSCULAR; INTRAVENOUS at 07:04

## 2025-04-07 RX ADMIN — SODIUM CHLORIDE: 9 INJECTION, SOLUTION INTRAVENOUS at 03:04

## 2025-04-07 RX ADMIN — ACETAMINOPHEN 1000 MG: 500 TABLET ORAL at 07:04

## 2025-04-07 RX ADMIN — CEFAZOLIN 2 G: 2 INJECTION, POWDER, FOR SOLUTION INTRAMUSCULAR; INTRAVENOUS at 07:04

## 2025-04-07 RX ADMIN — METOCLOPRAMIDE HYDROCHLORIDE 10 MG: 5 INJECTION INTRAMUSCULAR; INTRAVENOUS at 11:04

## 2025-04-07 RX ADMIN — PHENYLEPHRINE HYDROCHLORIDE 0.41 MCG/KG/MIN: 10 INJECTION INTRAVENOUS at 07:04

## 2025-04-07 RX ADMIN — MIDAZOLAM 1 MG: 1 INJECTION INTRAMUSCULAR; INTRAVENOUS at 07:04

## 2025-04-07 RX ADMIN — PROPOFOL 30 MG: 10 INJECTION, EMULSION INTRAVENOUS at 08:04

## 2025-04-07 RX ADMIN — SODIUM CHLORIDE, SODIUM GLUCONATE, SODIUM ACETATE, POTASSIUM CHLORIDE AND MAGNESIUM CHLORIDE: 526; 502; 368; 37; 30 INJECTION, SOLUTION INTRAVENOUS at 07:04

## 2025-04-07 RX ADMIN — BUPIVACAINE HYDROCHLORIDE 1.2 ML: 7.5 INJECTION, SOLUTION EPIDURAL; RETROBULBAR at 07:04

## 2025-04-07 RX ADMIN — METOCLOPRAMIDE HYDROCHLORIDE 10 MG: 5 INJECTION INTRAMUSCULAR; INTRAVENOUS at 10:04

## 2025-04-07 RX ADMIN — DEXAMETHASONE SODIUM PHOSPHATE 4 MG: 4 INJECTION, SOLUTION INTRA-ARTICULAR; INTRALESIONAL; INTRAMUSCULAR; INTRAVENOUS; SOFT TISSUE at 06:04

## 2025-04-07 RX ADMIN — METOCLOPRAMIDE HYDROCHLORIDE 10 MG: 5 INJECTION INTRAMUSCULAR; INTRAVENOUS at 04:04

## 2025-04-07 RX ADMIN — KETOROLAC TROMETHAMINE 10 MG: 10 TABLET, FILM COATED ORAL at 07:04

## 2025-04-07 RX ADMIN — ONDANSETRON 4 MG: 2 INJECTION INTRAMUSCULAR; INTRAVENOUS at 08:04

## 2025-04-07 RX ADMIN — KETOROLAC TROMETHAMINE 10 MG: 10 TABLET, FILM COATED ORAL at 08:04

## 2025-04-07 RX ADMIN — CEFAZOLIN 2 G: 2 INJECTION, POWDER, FOR SOLUTION INTRAMUSCULAR; INTRAVENOUS at 02:04

## 2025-04-07 RX ADMIN — PROPOFOL 50 MCG/KG/MIN: 10 INJECTION, EMULSION INTRAVENOUS at 07:04

## 2025-04-07 RX ADMIN — ONDANSETRON HYDROCHLORIDE 4 MG: 2 SOLUTION INTRAMUSCULAR; INTRAVENOUS at 08:04

## 2025-04-07 RX ADMIN — CEFAZOLIN 2 G: 2 INJECTION, POWDER, FOR SOLUTION INTRAMUSCULAR; INTRAVENOUS at 08:04

## 2025-04-07 RX ADMIN — POLYETHYLENE GLYCOL 3350 17 G: 17 POWDER, FOR SOLUTION ORAL at 08:04

## 2025-04-07 RX ADMIN — SODIUM CITRATE AND CITRIC ACID MONOHYDRATE 30 ML: 500; 334 SOLUTION ORAL at 09:04

## 2025-04-07 RX ADMIN — PROPOFOL 30 MG: 10 INJECTION, EMULSION INTRAVENOUS at 07:04

## 2025-04-07 RX ADMIN — BUPIVACAINE HYDROCHLORIDE 20 ML: 2.5 INJECTION, SOLUTION EPIDURAL; INFILTRATION; INTRACAUDAL; PERINEURAL at 09:04

## 2025-04-07 RX ADMIN — SENNOSIDES AND DOCUSATE SODIUM 2 TABLET: 50; 8.6 TABLET ORAL at 08:04

## 2025-04-07 NOTE — TRANSFER OF CARE
"Anesthesia Transfer of Care Note    Patient: Sherry Heredia    Procedure(s) Performed: Procedure(s) (LRB):  SDD- ROBOTIC ARTHROPLASTY, KNEE, TOTAL (Left)    Patient location: PACU    Anesthesia Type: spinal    Transport from OR: Transported from OR on room air with adequate spontaneous ventilation    Post pain: adequate analgesia    Post vital signs: stable    Level of consciousness: sedated and awake    Nausea/Vomiting: no nausea/vomiting    Complications: none    Transfer of care protocol was followed    Last vitals: Visit Vitals  /66   Pulse 110   Temp 36.5 °C (97.7 °F)   Resp (!) 21   Ht 5' 2" (1.575 m)   Wt 48.8 kg (107 lb 9.4 oz)   SpO2 100%   Breastfeeding No   BMI 19.68 kg/m²     "

## 2025-04-07 NOTE — OR NURSING
Pt has had multiple bouts of vomiting post-op, mostly with movement and position changes. Able to participate in physical therapy but continues with nausea/vomiting and dry heaving after iv zofran , compazine, and reglan.   Iv fluids resumed at 75cc/hr as ordered. Md and ARMOND Martinez NP made aware, pt will transfer to [facility] for overnight stay

## 2025-04-07 NOTE — PROGRESS NOTES
Patient experiencing nausea and vomiting unrelieved by post-op antiemetics. Will convert to observation status, continue IV fluids, make medication adjustments and plan for discharge once the patient is stable and at baseline.     Co-morbidities mgt:   Hypertension - Home meds restarted, monitor closely and adjust plan of care accordingly.     GERD - Pepcid/Reglan, Antacids PRN, PPI on hold due to hospital inventory/substitutions    Hyperlipidemia - statin on hold due to hospital inventory/substitutions

## 2025-04-07 NOTE — PLAN OF CARE
Problem: Physical Therapy  Goal: Physical Therapy Goal  Description: Pt will improve functional independence by performing:    Bed mobility: SBA  Sit to stand: SBA with rolling walker  Bed to chair: SBA with Stand Step  with rolling walker   Car Transfer: SBA with rolling walker  Ambulation x 200'  feet with SBA and rolling walker  1 Step (Curb): Min A  and rolling walker MET  5 Steps: Min A  and B HR MET  left knee AROM flexion (in degrees): 90 MET  left knee AROM extension (in degrees): 0 MET  Independent with total knee HEP MET    Outcome: Progressing

## 2025-04-07 NOTE — ANESTHESIA PROCEDURE NOTES
Peripheral Block    Patient location during procedure: post-op   Block not for primary anesthetic.  Reason for block: at surgeon's request and post-op pain management   Post-op Pain Location: Knee Left   Start time: 4/7/2025 9:11 AM  Timeout: 4/7/2025 9:10 AM   End time: 4/7/2025 9:12 AM    Staffing  Authorizing Provider: Claudia Walters MD  Performing Provider: Claudia Walters MD    Staffing  Performed by: Claudia Walters MD  Authorized by: Claudia Walters MD    Preanesthetic Checklist  Completed: patient identified, IV checked, site marked, risks and benefits discussed, surgical consent, monitors and equipment checked, pre-op evaluation and timeout performed  Peripheral Block  Patient position: supine  Prep: ChloraPrep  Patient monitoring: heart rate, cardiac monitor, continuous pulse ox, continuous capnometry and frequent blood pressure checks  Block type: adductor canal  Laterality: left  Injection technique: single shot  Needle  Needle type: Stimuplex   Needle gauge: 20 G  Needle length: 4 in  Needle localization: ultrasound guidance and anatomical landmarks   -ultrasound image captured on disc.  Assessment  Injection assessment: negative aspiration, negative parasthesia and local visualized surrounding nerve  Paresthesia pain: none  Heart rate change: no  Slow fractionated injection: yes  Pain Tolerance: comfortable throughout block and no complaints  Medications:    Medications: bupivacaine (pf) (MARCAINE) injection 0.25% - Perineural   20 mL - 4/7/2025 9:12:00 AM    Additional Notes  VSS.  RN monitoring vitals throughout procedure.  Patient tolerated procedure well.    Ultrasound guidance used to visualize the nerve bundle and sheath as well as confirm needle placement and deposition of the local anesthetic.  (1) Under ultrasound guidance, needle was inserted and placed in close proximity to the nerve bundle  (2) Ultrasound was also used to visualize the spread of the anesthetic in close  proximity to the femoral artery  (3) The nerves appeared anatomically normal  (4) There were no apparent abnormal pathological findings    Ultrasound photos archived.  Pt tolerated procedure well. There were no immediate complications.

## 2025-04-07 NOTE — DISCHARGE INSTRUCTIONS
[facility]  [facility] [facility] [address]  [place] [address]  Fax [phone]  SURGEON: José Garcia MD  Phone Number: [phone]    After discharge, all questions or concerns should be handled at your surgeon's office ([phone]). If it is a weekend or after hours, you will get the surgeon on call.     Discharge Medications:    PAIN MANAGEMENT: Next Dose Available           Tylenol/Acetaminophen 500mg- every 4 hours, around the clock (WHILE AWAKE)   4pm   Ultram/Tramadol 50mg (Pain Med) - every 4-6 hours AS NEEDED for pain   Anytime if needed   Robaxin/Methocarbamol 750mg (Muscle Relaxer) - Every 6-8 hours AS NEEDED for muscle spasms, thigh pain or additional pain control   Anytime if needed                       COMPLICATION PREVENTION MEDS: Next Dose DUE   Aspirin 81mg twice a day for 6 weeks post-op for blood clot prevention   TOMORROW MORNING   Miralax 17gm or Senokot S/Tabatha-Colace 8.6/50mg - 2 tablets once or twice a day while on narcotics and muscle relaxers for constipation prevention   PM on 4/8/2025   NOZIN NASAL  - twice a day for 2 weeks or until supply runs out, whichever comes first. (Infection Prevention)   PM on 4/8/2025               EXTRAS: Next Dose Available       Zofran/Ondansetron ODT 4mg (Nausea/Vomiting) - every 6 hours AS NEEDED for nausea and vomiting Anytime if needed               Total Knee Replacement  PAIN MEDICATIONS/PAIN MANAGEMENT:        Robaxin/Methocarbamol 750mg (muscle relaxer)- you can take every 6-8 hours as needed for muscle spasms, thigh pain and stiffness, additional pain control or breakthrough pain medications. This medication is helpful for pain control while lessening your need for narcotics. Please reduce the use gradually as the pain and spasms lessen. DO NOT TAKE AT THE SAME TIME AS A PAIN PILL. YOU WILL BE BETTER SERVED WITH 2 HOURS BETWEEN PAIN PILL AND MUSCLE RELAXER.     Tylenol/Acetaminophen 500mg every 4 hours,  around the clock (WHILE AWAKE). Take Ultram (Tramadol) 50mg (pain pill) every 4-6 hours as needed for pain.    **NO MORE THAN 3000mg OF TYLENOL IN 24 HOURS**.         Use the medication log in your discharge packet to keep track of your medications.    **Other things that help with pain control is WALKING, COMPRESSION WRAP, ICE and ELEVATION!!**    BLOOD CLOT PREVENTION:   Aspirin 81mg - twice a day for 6 weeks post-op. Start on the evening of 4/8/2025. Stop on 5/19/2025  If you were taking a Baby Aspirin 81mg prior to surgery, ok to restart after 6 weeks - 5/20/2025.    You need to continuing wearing your compression stockings (SERGO Hose - ThromboEmbolic Disease Prevention Device) for the next 2-6 weeks post-op. It is ok to remove them for hygiene and at bedtime.   Hand wash and Dry. **If the swelling persists in the legs after you stop wearing the Sergo hose, continue to wear them until the swelling decreases.**  REMOVE STOCKINGS AT LEAST DAILY FOR SKIN ASSESSMENT.   Do NOT let the stockings roll down, creating a tourniquet around the back of your knee and/or ankle. If you need to, leave the excess at the bottom of the stocking.   The best thing you can do to prevent blood clots is to walk around as much as possible, AT LEAST EVERY 1-2 HOURS.       CONSTIPATION PREVENTION:   Miralax or Senokot S/Tabatha-Colace and Stool softeners EVERY DAY while on pain meds.  Use other more aggressive over the counter LAXATIVES as needed for constipation (Examples: Milk of Magnesia, Dulcolax tabs or suppository, Magnesium Citrate, Fleet's Enema...etc.)   Drink lots of water.  Increase Fiber in diet.  Increase walking distance each day  DO NOT GO MORE THAN 2 DAYS WITHOUT HAVING A BOWEL MOVEMENT!      ACTIVITY:  You will be FULL weight bearing on your knee replacement. Work on the bend and the straightening as much as possible. Elevate Leg ABOVE THE LEVEL OF THE HEART for 20-30 minutes a few times a day to reduce swelling.   Walk around  every hour and reposition yourself often throughout the day.   DO NOT TAKE YOURSELF OFF OF WALKER. Allow your physical therapist to guide you.     SWELLING PREVENTION AND TREATMENT:   Knee - compression stockings, ace wrap and elevate operative leg way above the level of the heart - 20-30 mins, 3 times a day.   ICE AS MUCH AS POSSIBLE    THERAPY  Outpatient Physical Therapy-bring your prescription to the clinic of your choice as soon as possible.    WOUND CARE:   You will find a Grey/Sliver Mepilex 7-day  dressing in place. This dressing can stay in place for up to 7 days. Change dressing on 4/14/2025. DO NOT REMOVE UNTIL DRESSING CHANGE DATE UNLESS SOILED OR THE DRESSING IS COMPROMISED. Apply CADENCE hose and then wrap the knee all the way up for extra added compression to the knee and thigh.  Once Mepilex is removed, apply a 2nd Grey/Silver 7-day Mepilex bandage for the next 7 days or until you follow-up with your surgeon. Notify MD of excessive wound drainage.     Poke hole(s) -  Change every other day and as needed for soiling thereafter. Start on THURSDAY.    DO NOT WET INCISION(s) or apply any ointments, creams, lotions or antiseptics.  Ok to shower if able to keep wound from getting wet (plastic barrier, saran wrap or cling wrap and tape).   DO NOT TOUCH INCISION(s)    URINARY RETENTION:  If you start having difficulty urinating, decrease the use of Pain pills and muscle relaxers and notify your primary care doctor.     PNEUMONIA PREVENTION:  Stay out of bed as much as possible and walk around every 1-2 hours.  Continue breathing exercises (Incentive Spirometry) every 1-2 hours while mobility is limited and while you are on pain pills.    FALL PREVENTION:  Wear sturdy shoes that fit well - Wearing shoes with high heels or slippery soles, or shoes that are too loose, can lead to falls. Walking around in bare feet, or only socks, can also increase your risk of falling.  Use walker as long as your surgeon and  therapist recommend it  Use good lighting and  throw rugs, electrical cords, furniture and clutter (anything than can cause you to trip at home.   Non-slip rug in bathroom or shower    INFECTION PREVENTION:  NOZIN ANTISEPTIC NASAL  - twice a day for 2 weeks or until supply runs out, whichever comes first. Shake bottle well, saturate cotton swab with 4 drops of antiseptic solution. Swab right nostril rim 6-8 times clockwise and counterclockwise. Take swab out, apply 2 more drops then swab left nostril rim 6-8 times clockwise and counterclockwise.   Proper handwashing before and after dressing changes. Do not wet the wound. Wound care instructions as written above. NOTIFY MD OF EXCESSIVE WOUND DRAINAGE.  No alcohol, smoking or tobacco products  Pets should not be allowed around the wound or the dressing.   Treat UTI and skin infections as soon as possible.  Pre-medicate with antibiotics prior to dental or surgical procedures.   If you are diabetic, MAINTAIN GOOD BLOOD SUGAR CONTROL (Below 150) DURING YOUR RECOVERY. If you see high numbers, notify your primary care doctor.     Call your SURGEON'S OFFICE ([phone]) if you experience the following signs and symptoms of infection:   Unusual redness, swelling, excessive, cloudy or foul smelling drainage at the incision site.   Persistent low grade temp OR a temp greater than 102 F, unrelieved by Tylenol  Pain at surgical site, unrelieved by pain meds    Warning signs of a blood clot in your leg: (CALL YOUR SURGEON)  New onset or increasing pain in calf, new onset tenderness or redness above or below the knee or increasing swelling of your calf, ankle, or foot.  Warning signs that a blood clot has traveled to your lungs: (REPORT TO THE ER/CALL 413)  Sudden or increase in Shortness of breath, sudden onset of chest pains, or  Localized chest pain with coughing.     IF ANY ISSUES ARISE AND YOU FEEL THE NEED TO CALL YOUR PRIMARY CARE DOCTOR, PLEASE LET YOUR  SURGEON KNOW AS WELL.     For emergencies, please report to OUR ([facility] or [facility] main [facility]) Emergency department and tell them to call YOUR SURGEON at [phone].     BEFORE MAKING ANY CHANGES TO THE MEDICAL CARE PLAN OR GOING TO THE EMERGENCY ROOM, PLEASE CONTACT THE SURGEON.    After discharge, all questions or concerns should be handled at your surgeon's office ([phone]). If it is a weekend or after hours, you will get the surgeon on call.     Patient Education       Total Knee Replacement Discharge Instructions   About this topic   The knee joint is the largest joint in the body and has a number of parts. Cartilage covers parts of the joint in a normal knee. This smooth tissue lets the joint move easily. The cartilage can become worn and cause bone to rub on other bone. This might happen from damage due to wear and tear over time or from an injury. This often leads to pain, stiffness, and trouble walking. Sometimes, drugs and exercises can help you control the pain. When they stop working, you may need knee joint replacement (arthroplasty) surgery.  The doctor replaces your diseased or injured knee joint with a new one. Metal and plastic parts replace your natural knee joint.         American Academy [employer] Orthopa[employer]geons  http://orthoinfo.aaos.org/topic.cfm?kwjcf=w03470   NHS Choices  http://www.nhs.uk/conditions/knee-replacement/pages/kneereplacementexplained.aspx   Last Reviewed Date   2020-10-12  Consumer Information Use and Disclaimer   This information is not specific medical advice and does not replace information you receive from your health care provider. This is only a brief summary of general information. It does NOT include all information about conditions, illnesses, injuries, tests, procedures, treatments, therapies, discharge instructions or life-style choices that may apply to you. You must talk with your health care provider for complete information about your health and treatment options.  This information should not be used to decide whether or not to accept your health care providers advice, instructions or recommendations. Only your health care provider has the knowledge and training to provide advice that is right for you.   Copyright   Copyright © 2021 UpToDate, Inc. and its affiliates and/or licensors. All rights reserved.         .

## 2025-04-07 NOTE — ANESTHESIA POSTPROCEDURE EVALUATION
Anesthesia Post Evaluation    Patient: Sherry Heredia    Procedure(s) Performed: Procedure(s) (LRB):  SDD- ROBOTIC ARTHROPLASTY, KNEE, TOTAL (Left)    Final Anesthesia Type: spinal      Patient location during evaluation: PACU  Patient participation: Yes- Able to Participate  Level of consciousness: awake and alert  Post-procedure vital signs: reviewed and stable  Pain management: adequate (Adductor canal block in pacu)  Airway patency: patent    PONV status at discharge: No PONV  Anesthetic complications: no      Cardiovascular status: blood pressure returned to baseline and hemodynamically stable  Respiratory status: unassisted and spontaneous ventilation                Vitals Value Taken Time   /67 04/07/25 09:12   Temp 36.5 °C (97.7 °F) 04/07/25 09:06   Pulse 105 04/07/25 09:13   Resp 17 04/07/25 09:13   SpO2 100 % 04/07/25 09:13   Vitals shown include unfiled device data.      No case tracking events are documented in the log.      Pain/Yamini Score: Pain Rating Prior to Med Admin: 0 (4/7/2025  7:17 AM)  Yamini Score: 9 (4/7/2025  9:04 AM)

## 2025-04-07 NOTE — ANESTHESIA PREPROCEDURE EVALUATION
"                                                                                                             04/07/2025  Sherry Heredia is a 79 y.o., female with   -------------------------------------    Age-related nuclear cataract of both eyes    Chronic idiopathic constipation    Claustrophobia    " close doors"    Diverticulitis    hx    Dry eyes    Gastroesophageal reflux disease without esophagitis    [place] (hard of hearing)    Left knee pain    Dr Godwin    Migraine headache    inpast    Mixed hyperlipidemia    ASCVD Score = 19.8%     Primary hypertension    Renal stones    hx renal stones    Right hip pain    Right hip replacement 2019    Wears glasses       And   ----------------------------    Cholecystectomy    Colonoscopy    Dr Love 10/2021    Dental surgery    Heel spur surgery    with pinning    Mammogram 07/2021    [facility]    Oophorectomy    Open reduction and internal fixation (orif) of injury of elbow    Total hip arthroplasty    2019       Presents for SDD for Left TKA  Hx of PONV - had vomiting after she returned to her room after VERNELL in 2019    Had spinal but we do not have the records for this - per pt she remembers the anesthesiologist saying she had scoliosis - not severe per CXR of T-spine          Pre-op Assessment    I have reviewed the NPO Status.      Review of Systems  Cardiovascular:     Hypertension                                    Hypertension         Renal/:  Chronic Renal Disease        Kidney Function/Disease             Hepatic/GI:     GERD         Gerd          Neurological:    Neuromuscular Disease,  Headaches      Dx of Headaches                         Neuromuscular Disease   Psych:  Psychiatric History                 Latest Reference Range & Units 03/20/25 10:15   WBC 4.50 - 11.50 x10(3)/mcL 8.24   Hemoglobin 12.0 - 16.0 g/dL 13.0   Hematocrit 37.0 - 47.0 % 40.1   Platelet Count 130 - 400 x10(3)/mcL 321   Sodium 136 - 145 mmol/L 143   Potassium 3.5 - 5.1 " mmol/L 4.7   Chloride 98 - 107 mmol/L 106   CO2 23 - 31 mmol/L 26   Anion Gap mEq/L 11.0   BUN 9.8 - 20.1 mg/dL 21.8 (H)   Creatinine 0.55 - 1.02 mg/dL 0.88   BUN/CREAT RATIO  25   eGFR mL/min/1.73/m2 >60   Glucose 82 - 115 mg/dL 114   (H): Data is abnormally high    Physical Exam  General: Well nourished, Cooperative, Alert and Oriented    Airway:  Mallampati: II   Mouth Opening: Normal  TM Distance: Normal  Tongue: Normal  Neck ROM: Normal ROM    Dental:  Intact    Chest/Lungs:  Clear to auscultation, Normal Respiratory Rate    Heart:  Rate: Normal  Rhythm: Regular Rhythm        Anesthesia Plan  Type of Anesthesia, risks & benefits discussed:    Anesthesia Type: Spinal  Intra-op Monitoring Plan: Standard ASA Monitors  Post Op Pain Control Plan: multimodal analgesia  Informed Consent: Patient consented to blood products? Yes  ASA Score: 3  Day of Surgery Review of History & Physical: H&P Update referred to the surgeon/provider.  Anesthesia Plan Notes: ERAS ordered by surgeon according to [facility] protocol  Goal directed IV Fluid therapy   No ibrahim necessary unless hx of BPH/urinary retention - will give flomax preop for all patients with intact prostate and an additional dose in PACU for patients who have existing prostate issues   Aggressive tx of hypotension - may require IM ephedrine at end of case/pacu if hypotension may prevent mobilization   In PACU will perform postop pain adductor canal block for postop pain control with Bupiv 0.25% and Exparel as requested by Dr. Roger    Ready For Surgery From Anesthesia Perspective.     .

## 2025-04-07 NOTE — OP NOTE
OPERATIVE REPORT    Patient: Sherry Heredia   : 1945    MRN: [id number]  Date: 2025      Surgeon: José Garcia MD  Assistant: Lebron Hoyt, PGY3  Preoperative Diagnosis:  Left knee primary osteoarthritis  Postoperative Diagnosis: Same  Procedure:  L EMILY TKA (30197)  Anesthesiologist: Claudia Walters MD  Implants:   Implant Name Type Inv. Item Serial No.  Lot No. LRB No. Used Action   PIN BONE [id number] - [id number]  PIN BONE [id number]  [employer]  Left 1 Implanted and Explanted   PIN FIXATION BONE 140X3.2MM - [id number]  PIN FIXATION BONE 140X3.2MM  [employer].  Left 1 Implanted and Explanted   COMPONENT TRIATHLON CR L SZ 2 - [id number]  COMPONENT TRIATHLON CR L SZ 2  [employer] [id number] Left 1 Implanted   INSERT TIBIAL CR X3 9MM SZ 3 - [id number]  INSERT TIBIAL CR X3 9MM SZ 3  [employer] [id number] Left 1 Implanted   BASEPLATE TRITANIUM 44MM SZ 3 - [id number]  BASEPLATE TRITANIUM 44MM SZ 3  [employer] [id number] Left 1 Implanted     OR Staff:  Circulator: Iggy Farrell RN  Scrub Person: Mila Juan ST; Rome Kothari ST  EBL: 50  Complications: None  Disposition: To PACU, stable    Indications: Sherry Heredia is a 79 y.o. female presenting with left knee pain.  Patient had tried and failed all conservative measures including injections, activity modification, NSAIDs, and home exercise program.  Risks, benefits, and alternatives discussed with regards to left total knee arthroplasty.  All questions answered to patients satisfaction, no guarantees made.  Despite these risks, the patient agreed to proceed with surgical intervention.     Procedure Note:  The patient was brought back to the OR and placed supine on the OR table. After successful induction of anesthesia by anesthesia staff, all bony prominences were padded appropriately.  Tourniquet placed to the left upper thigh.  Left knee prepped  and draped in normal sterile fashion. At this time, a time-out was performed, with the correct patient, site, and procedure identified.  Preoperative antibiotics were verified as administered.     Leg was exsanguinated using Esmarch tourniquet and tourniquet was inflated to 250 mmHg.  Standard midline parapatellar incision was performed taken through the skin and subcutaneous tissue.  A fresh knife was used to make an arthrotomy midline parapatellar approach.  Proximal medial tibia retinaculam was released from the proximal medial tibial plateau.  Small resection of prepatellar fat pad was performed.  Knee flexed up.  Schanz pins attached to the distal femur as well as distal tibia.  SpaBooker robotic arrays attached to both sets of pins.  Checkpoints attached to distal femur and proximal tibia.  Registration performed initially with hip center followed by ankle center.  Registration of both the distal femur and proximal tibia performed using Augie protocol.  We then checked joint balance and ligamentous tension in both extension as well as 90° of flexion.  Adjusted femoral and tibial implants to get appropriate gaps in both extension as well as flexion.  After we were satisfied with implant position as well as volume resection, SpaBooker robot was brought in.  Femoral cuts performed followed by tibial cuts.  Care performed to protect all necessary soft tissue structures during bony resection.  Bony fragments and resected and removed from the knee.  Medial and lateral meniscus resected.  Tibial tray placed in the position and pinned.  Trial liner placed in position.  Trial femoral component placed in position and adjusted medially and laterally to the appropriate position.  Knee taken to full range of motion.  Came to full extension.  Excellent flexion greater than 120°.  Stable throughout his range of motion with no extension instability or flexion instability.  Patella tracked well.    Femoral lugs drilled and the femoral  trial removed.  We then punched the tibia with a tibial tower.  Tibial trial then removed.  Implants opened.  Tibia component implanted, followed by the polyethylene liner.  Femoral component then implanted.   At this point brought the knee to full range of motion yet again.  Again excellent range of motion from full extension greater than 120° of flexion was noted with no instability at full flexion, mid flexion and extension.  Checkpoints removed as well as pins.    The tourniquet was dropped.  All bleeders were coagulated.  Injection of joint cocktail periarticularly.  Copious irrigation with normal saline performed along with Betadine lavage followed by more normal saline.  We then turned our attention towards closure.  Arthrotomy closed with #1 Vicryl as well as #1 Stratafix suture.  Subcutaneous tissue closed with 2-0 Monocryl.  Followed by skin closure.    Patient arose from anesthesia without any issue and was then subsequently transferred to to PACU in a stable condition.    All sponge and needle counts were correct at the end of the case.  I was present and participated in all aspects of the procedure.    Prognosis:  The patient will be weight-bearing as tolerated to the right lower extremity with range of motion with physical therapy.  Patient will receive DVT prophylaxis .   plan discharge home versus a facility once the patient is stable with physical therapy guidelines.      This note/OR report was created with the assistance of  voice recognition software or phone  dictation.  There may be transcription errors as a result of using this technology however minimal. Effort has been made to assure accuracy of transcription but any obvious errors or omissions should be clarified with the author of the document.

## 2025-04-07 NOTE — ANESTHESIA PROCEDURE NOTES
Spinal    Diagnosis: Osteoarthritis  Patient location during procedure: OR  Start time: 4/7/2025 7:35 AM  Timeout: 4/7/2025 7:35 AM  End time: 4/7/2025 7:40 AM    Staffing  Authorizing Provider: Claudia Walters MD  Performing Provider: Claudia Walters MD    Staffing  Performed by: Claudia Walters MD  Authorized by: Claudia Walters MD    Preanesthetic Checklist  Completed: patient identified, IV checked, site marked, risks and benefits discussed, surgical consent, monitors and equipment checked, pre-op evaluation and timeout performed  Spinal Block  Patient position: sitting  Prep: ChloraPrep  Patient monitoring: heart rate, cardiac monitor, continuous pulse ox and frequent blood pressure checks  Approach: midline  Location: L4-5  Injection technique: single shot  CSF Fluid: clear free-flowing CSF  Needle  Needle type: pencil-tip   Needle gauge: 25 G  Needle length: 3.5 in  Additional Documentation: incremental injection, negative aspiration for heme and no paresthesia on injection  Needle localization: anatomical landmarks  Assessment  Ease of block: easy  Patient's tolerance of the procedure: comfortable throughout block and no complaints  Medications:    Medications: bupivacaine (pf) (MARCAINE) injection 0.75% - Intraspinal   1.2 mL - 4/7/2025 7:40:00 AM

## 2025-04-07 NOTE — PLAN OF CARE
04/07/25 1346   Discharge Planning   Assessment Type Discharge Planning Brief Assessment   Support Systems Spouse/significant other   Equipment Currently Used at Home walker, rolling   Current Living Arrangements home   Patient/Family Anticipates Transition to home with family   Patient/Family Anticipated Services at Transition outpatient care   DME Needed Upon Discharge  walker, rolling   Discharge Plan A Home with family   Discharge Plan B Home with family     SDD. S/p TKR . Hx [place]. Spk w pt & , gustabo @ bedside --no change in dcp.   Faxed updated info to [employer] [facility]    Contact # [place] [phone]

## 2025-04-07 NOTE — PT/OT/SLP EVAL
Physical Therapy Evaluation    Patient Name:  Sherry Heredia   MRN:  [id number]    Recommendations:     Discharge Recommendations: Low Intensity Therapy   Discharge Equipment Recommendations: walker, rolling   Barriers to discharge:  Nausea    Assessment:     Sherry Heredia is a 79 y.o. female admitted with a medical diagnosis of Primary osteoarthritis of left knee.  She presents with the following impairments/functional limitations: weakness, impaired endurance, impaired functional mobility, decreased lower extremity function, pain, decreased ROM, edema, orthopedic precautions .    Rehab Prognosis: Good; patient would benefit from acute skilled PT services to address these deficits and reach maximum level of function.    Recent Surgery: Procedure(s) (LRB):  SDD- ROBOTIC ARTHROPLASTY, KNEE, TOTAL (Left) * Day of Surgery *    Plan:     During this hospitalization, patient to be seen BID to address the identified rehab impairments via gait training, therapeutic activities, therapeutic exercises and progress toward the following goals:    Plan of Care Expires:  04/11/25    Subjective     Chief Complaint: L knee pain  Patient/Family Comments/goals:   Pain/Comfort:  Location - Side 1: Left  Location 1: knee  Pain Addressed 1: Pre-medicate for activity, Reposition, Distraction    Patients cultural, spiritual, Islam conflicts given the current situation:      Living Environment:  Pt lives in single story home with , 5 steps to enter home with wide HR.  Prior to admission, patients level of function was ind.  Equipment used at home: none.  DME owned (not currently used): rolling walker.  Upon discharge, patient will have assistance from .    Objective:     Communicated with nurse prior to session.  Patient found supine with peripheral IV, telemetry  upon PT entry to room.    General Precautions: Standard, fall  Orthopedic Precautions:LLE weight bearing as tolerated   Braces: N/A  Respiratory Status: Room  "air    Exams:  RLE ROM: WFL  RLE Strength: WFL  LLE ROM:     (In degrees) AROM PROM   L knee flexion 90 100   L knee extension 0       LLE Strength: NT dt sx side    Functional Mobility:  Bed Mobility:     Supine to Sit: stand by assistance  Transfers:     Sit to Stand:  contact guard assistance with rolling walker  Car Transfer: contact guard assistance with  rolling walker  using  Step Transfer  Gait: Pt ambulated 200 ft, 150 ft w rw and CGA, using step through gait pattern at slow pace. Pt required several standing and seated rest breaks between due to nausea/vomiting.BP: 132/78,  NSG notified  Stairs:  Pt ascended/descended 6 stair(s) and 4" curb step with Rolling Walker with L HR  with Contact Guard Assistance.             Treatment & Education:  Pt edu on total knee protocol and importance of frequent mobility  Pt completed seated TKA therex x10 AAROM  Pt completed prehab prior to sx    Patient left up in chair with all lines intact, call button in reach, nurse notified, and  present.    GOALS:   Multidisciplinary Problems       Physical Therapy Goals          Problem: Physical Therapy    Goal Priority Disciplines Outcome Interventions   Physical Therapy Goal     PT, PT/OT Progressing    Description: Pt will improve functional independence by performing:    Bed mobility: SBA  Sit to stand: SBA with rolling walker  Bed to chair: SBA with Stand Step  with rolling walker   Car Transfer: SBA with rolling walker  Ambulation x 200'  feet with SBA and rolling walker  1 Step (Curb): Min A  and rolling walker MET  5 Steps: Min A  and B HR MET  left knee AROM flexion (in degrees): 90 MET  left knee AROM extension (in degrees): 0 MET  Independent with total knee HEP MET                         DME Justifications:   Sherry's mobility limitation cannot be sufficiently resolved by the use of a cane. Her functional mobility deficit can be sufficiently resolved with the use of a Rolling Walker. Patient's " "mobility limitation significantly impairs their ability to participate in one of more activities of daily living.  The use of a RW will significantly improve the patient's ability to participate in MRADLS and the patient will use it on regular basis in the home.    History:     Past Medical History:   Diagnosis Date    Age-related nuclear cataract of both eyes 5/23/2022    Chronic idiopathic constipation 5/23/2022    Claustrophobia     " close doors"    Diverticulitis     hx    Dry eyes     Gastroesophageal reflux disease without esophagitis 5/23/2022    [employer] [place] (hard of hearing)     Left knee pain     Dr Godwin    Migraine headache 5/23/2022    inpast    Mixed hyperlipidemia 5/23/2022    ASCVD Score = 19.8%     Primary hypertension 5/23/2022    Renal stones     hx renal stones    Right hip pain     Right hip replacement 2019    Wears glasses        Past Surgical History:   Procedure Laterality Date    CHOLECYSTECTOMY      COLONOSCOPY      Dr Love 10/2021    DENTAL SURGERY      HEEL SPUR SURGERY Right     with pinning    mammogram 07/2021      [facility]    OOPHORECTOMY      OPEN REDUCTION AND INTERNAL FIXATION (ORIF) OF INJURY OF ELBOW Bilateral 2020    TOTAL HIP ARTHROPLASTY Right     2019       Time Tracking:     PT Received On:    PT Start Time: 1432     PT Stop Time: 1520  PT Total Time (min): 48 min     Billable Minutes: Evaluation 30 and Gait Training 18      04/07/2025  "

## 2025-04-08 VITALS
WEIGHT: 107.56 LBS | SYSTOLIC BLOOD PRESSURE: 144 MMHG | BODY MASS INDEX: 19.79 KG/M2 | TEMPERATURE: 98 F | DIASTOLIC BLOOD PRESSURE: 74 MMHG | HEART RATE: 87 BPM | RESPIRATION RATE: 17 BRPM | OXYGEN SATURATION: 96 % | HEIGHT: 62 IN

## 2025-04-08 LAB
ANION GAP SERPL CALC-SCNC: 8 MEQ/L
BUN SERPL-MCNC: 25.7 MG/DL (ref 9.8–20.1)
CALCIUM SERPL-MCNC: 8.6 MG/DL (ref 8.4–10.2)
CHLORIDE SERPL-SCNC: 109 MMOL/L (ref 98–107)
CO2 SERPL-SCNC: 25 MMOL/L (ref 23–31)
CREAT SERPL-MCNC: 1.04 MG/DL (ref 0.55–1.02)
CREAT/UREA NIT SERPL: 25
ERYTHROCYTE [DISTWIDTH] IN BLOOD BY AUTOMATED COUNT: 13.5 % (ref 11.5–17)
GFR SERPLBLD CREATININE-BSD FMLA CKD-EPI: 55 ML/MIN/1.73/M2
GLUCOSE SERPL-MCNC: 112 MG/DL (ref 82–115)
HCT VFR BLD AUTO: 31.4 % (ref 37–47)
HGB BLD-MCNC: 10.4 G/DL (ref 12–16)
MCH RBC QN AUTO: 29.9 PG (ref 27–31)
MCHC RBC AUTO-ENTMCNC: 33.1 G/DL (ref 33–36)
MCV RBC AUTO: 90.2 FL (ref 80–94)
NRBC BLD AUTO-RTO: 0 %
PLATELET # BLD AUTO: 282 X10(3)/MCL (ref 130–400)
PMV BLD AUTO: 8.5 FL (ref 7.4–10.4)
POTASSIUM SERPL-SCNC: 4 MMOL/L (ref 3.5–5.1)
RBC # BLD AUTO: 3.48 X10(6)/MCL (ref 4.2–5.4)
SODIUM SERPL-SCNC: 142 MMOL/L (ref 136–145)
WBC # BLD AUTO: 12.46 X10(3)/MCL (ref 4.5–11.5)

## 2025-04-08 PROCEDURE — 97110 THERAPEUTIC EXERCISES: CPT

## 2025-04-08 PROCEDURE — 25000003 PHARM REV CODE 250: Performed by: NURSE PRACTITIONER

## 2025-04-08 PROCEDURE — 97530 THERAPEUTIC ACTIVITIES: CPT

## 2025-04-08 PROCEDURE — 94761 N-INVAS EAR/PLS OXIMETRY MLT: CPT

## 2025-04-08 PROCEDURE — 80048 BASIC METABOLIC PNL TOTAL CA: CPT | Performed by: NURSE PRACTITIONER

## 2025-04-08 PROCEDURE — 63600175 PHARM REV CODE 636 W HCPCS: Performed by: ORTHOPAEDIC SURGERY

## 2025-04-08 PROCEDURE — G0378 HOSPITAL OBSERVATION PER HR: HCPCS

## 2025-04-08 PROCEDURE — 63600175 PHARM REV CODE 636 W HCPCS: Performed by: NURSE PRACTITIONER

## 2025-04-08 PROCEDURE — 25000003 PHARM REV CODE 250: Performed by: ORTHOPAEDIC SURGERY

## 2025-04-08 PROCEDURE — 85027 COMPLETE CBC AUTOMATED: CPT | Performed by: NURSE PRACTITIONER

## 2025-04-08 PROCEDURE — 36415 COLL VENOUS BLD VENIPUNCTURE: CPT | Performed by: NURSE PRACTITIONER

## 2025-04-08 RX ORDER — TRAMADOL HYDROCHLORIDE 50 MG/1
50 TABLET ORAL EVERY 4 HOURS PRN
Status: DISCONTINUED | OUTPATIENT
Start: 2025-04-08 | End: 2025-04-08 | Stop reason: HOSPADM

## 2025-04-08 RX ORDER — ACETAMINOPHEN 500 MG
500 TABLET ORAL
Status: DISCONTINUED | OUTPATIENT
Start: 2025-04-08 | End: 2025-04-08 | Stop reason: HOSPADM

## 2025-04-08 RX ORDER — ACETAMINOPHEN 10 MG/ML
1000 INJECTION, SOLUTION INTRAVENOUS ONCE
Status: COMPLETED | OUTPATIENT
Start: 2025-04-08 | End: 2025-04-08

## 2025-04-08 RX ADMIN — ACETAMINOPHEN 1000 MG: 10 INJECTION INTRAVENOUS at 09:04

## 2025-04-08 RX ADMIN — ASPIRIN 81 MG: 81 TABLET, CHEWABLE ORAL at 08:04

## 2025-04-08 RX ADMIN — FAMOTIDINE 20 MG: 20 TABLET, FILM COATED ORAL at 05:04

## 2025-04-08 RX ADMIN — SENNOSIDES AND DOCUSATE SODIUM 2 TABLET: 50; 8.6 TABLET ORAL at 08:04

## 2025-04-08 RX ADMIN — LISINOPRIL 10 MG: 10 TABLET ORAL at 08:04

## 2025-04-08 RX ADMIN — DOCUSATE SODIUM 200 MG: 100 CAPSULE, LIQUID FILLED ORAL at 05:04

## 2025-04-08 RX ADMIN — CEFAZOLIN 2 G: 2 INJECTION, POWDER, FOR SOLUTION INTRAMUSCULAR; INTRAVENOUS at 02:04

## 2025-04-08 RX ADMIN — KETOROLAC TROMETHAMINE 10 MG: 10 TABLET, FILM COATED ORAL at 02:04

## 2025-04-08 RX ADMIN — TRAMADOL HYDROCHLORIDE 50 MG: 50 TABLET, COATED ORAL at 04:04

## 2025-04-08 RX ADMIN — KETOROLAC TROMETHAMINE 10 MG: 10 TABLET, FILM COATED ORAL at 08:04

## 2025-04-08 NOTE — DISCHARGE SUMMARY
[facility] - Orthopaedics  Orthopedics  Discharge Summary      Patient Name: Sherry Heredia  MRN: [id number]  Admission Date: 4/7/2025  Hospital Length of Stay: 0 days  Discharge Date and Time: 4/8/2025 11:17 AM  Attending Physician: No att. providers found   Discharging Provider: José Garcia MD  Primary Care Provider: Arlen Bueno MD     HPI:  Complaints of left knee pain.  He has tried and failed all conservative measures.  Risks, benefits alternatives total knee arthroplasty with a detail.  All questions answered to patient's satisfaction    Procedure(s) (LRB):  SDD- ROBOTIC ARTHROPLASTY, KNEE, TOTAL (Left)      Hospital Course:  Patient underwent left Augie total knee arthroplasty on the above date.  Worked with the physical therapy begin the day of surgery.  Continue to work physical hip with a throughout his stay.  Progressing well.  By the has a discharge, the patient has completed all physical therapy guidelines.  It is about a signs were stable.  Laboratory markers were stable.  At this point he was deemed suitable for discharge.        Pending Diagnostic Studies:       None          Final Active Diagnoses:    Diagnosis Date Noted POA    PRINCIPAL PROBLEM:  Primary osteoarthritis of left knee [M17.12] 04/07/2025 Yes      Problems Resolved During this Admission:      Discharged Condition: good    Disposition: Home or Self Care    Follow Up:   Follow-up Information       [facility]    Why: This is the outpatient therapy facility. They will contact pt with appt date & time. Call if you have questions or concerns.  Contact information:  [address]   [address]  [facility] [address]  [phone]               José Garcia MD. Go on 4/22/2025.    Specialty: Orthopedic Surgery  Why: Ortho follow up appt on Tuesday 4/22/25 @ 9:15am.  Contact information:  [address]  [address]  [facility] [address]  [phone]                            Patient Instructions:   No discharge procedures on file.  Discharge instructions:  Signs and symptoms of infection were discussed with the patient including but not limited to fevers chills nausea vomiting ongoing drainage.  Patient is instructed to contact the Orthopedic Service on call we will presents to the emergency department if that has any signs symptoms of infection present.      José Garcia MD  Orthopedics  [facility] - Orthopaedics

## 2025-04-08 NOTE — PLAN OF CARE
Problem: Adult Inpatient Plan of Care  Goal: Plan of Care Review  Outcome: Met  Goal: Patient-Specific Goal (Individualized)  Outcome: Met  Goal: Absence of Hospital-Acquired Illness or Injury  Outcome: Met  Goal: Optimal Comfort and Wellbeing  Outcome: Met  Goal: Readiness for Transition of Care  Outcome: Met     Problem: Wound  Goal: Optimal Coping  Outcome: Met  Goal: Optimal Functional Ability  Outcome: Met  Goal: Absence of Infection Signs and Symptoms  Outcome: Met  Goal: Improved Oral Intake  Outcome: Met  Goal: Optimal Pain Control and Function  Outcome: Met  Goal: Skin Health and Integrity  Outcome: Met  Goal: Optimal Wound Healing  Outcome: Met     Problem: Infection  Goal: Absence of Infection Signs and Symptoms  Outcome: Met     Problem: Comorbidity Management  Goal: Blood Pressure in Desired Range  Outcome: Met     Problem: Knee Arthroplasty  Goal: Optimal Coping  Outcome: Met  Goal: Absence of Bleeding  Outcome: Met  Goal: Effective Bowel Elimination  Outcome: Met  Goal: Fluid and Electrolyte Balance  Outcome: Met  Goal: Optimal Functional Ability  Outcome: Met  Goal: Absence of Infection Signs and Symptoms  Outcome: Met  Goal: Intact Neurovascular Status  Outcome: Met  Goal: Anesthesia/Sedation Recovery  Outcome: Met  Goal: Optimal Pain Control and Function  Outcome: Met  Goal: Nausea and Vomiting Relief  Outcome: Met  Goal: Effective Urinary Elimination  Outcome: Met  Goal: Effective Oxygenation and Ventilation  Outcome: Met     Problem: Fall Injury Risk  Goal: Absence of Fall and Fall-Related Injury  Outcome: Met

## 2025-04-08 NOTE — PROGRESS NOTES
"No acute events overnight.  Pain controlled.  Resting in bed.     Vital Signs  Temp: 98.1 °F (36.7 °C)  Temp Source: Oral  Pulse: 97  Heart Rate Source: Monitor  Resp: 17  SpO2: 95 %  Pulse Oximetry Type: Intermittent  Flow (L/min) (Oxygen Therapy): 10  Oxygen Concentration (%): 80  Device (Oxygen Therapy): room air  BP: 118/68  BP Location: Right arm  BP Method: Automatic  Patient Position: Lying  Height and Weight  Height: 5' 2" (157.5 cm)  Height Method: Stated  Weight: 48.8 kg (107 lb 9.4 oz)  Weight Method: Standard Scale  BSA (Calculated - sq m): 1.46 sq meters  BMI (Calculated): 19.7  Weight in (lb) to have BMI = 25: 136.4]    +FHL/EHL  BCR distally  Dressing c/d/i  SILT distally    Recent Lab Results         04/08/25  0627   04/07/25  0905        Anion Gap 8.0         BUN 25.7         BUN/CREAT RATIO 25         Calcium 8.6         Chloride 109         CO2 25         Creatinine 1.04         eGFR 55  Comment: Estimated GFR calculated using the CKD-EPI creatinine (2021) equation.         Glucose 112         Hematocrit 31.4         Hemoglobin 10.4         MCH 29.9         MCHC 33.1         MCV 90.2         MPV 8.5         nRBC 0.0         Platelet Count 282         POCT Glucose   109       Potassium 4.0         RBC 3.48         RDW 13.5         Sodium 142         WBC 12.46                 A/P:  Status post TKA with post op nausea  Pain controlled  Overall patient doing well.  Therapy for mobility and ambulation.  DVT PPx  Home later today  "

## 2025-04-08 NOTE — PT/OT/SLP PROGRESS
"Physical Therapy Treatment    Patient Name:  Sherry Heredia   MRN:  [id number]    Recommendations:     Discharge therapy intensity: Low Intensity Therapy   Discharge Equipment Recommendations: walker, rolling  Barriers to discharge: Ongoing medical needs    Assessment:     Sherry Heredia is a 79 y.o. female admitted with a medical diagnosis of Primary osteoarthritis of left knee.  She presents with the following impairments/functional limitations: weakness, impaired endurance, impaired functional mobility, decreased lower extremity function, pain, decreased ROM, edema, orthopedic precautions.    Rehab Prognosis: Good; patient would benefit from acute skilled PT services to address these deficits and reach maximum level of function.    Recent Surgery: Procedure(s) (LRB):  SDD- ROBOTIC ARTHROPLASTY, KNEE, TOTAL (Left) 1 Day Post-Op    Plan:     During this hospitalization, patient would benefit from acute PT services BID to address the identified rehab impairments via gait training, therapeutic activities, therapeutic exercises and progress toward the following goals:    Plan of Care Expires:  04/11/25    Subjective     Chief Complaint: L knee pain  Patient/Family Comments/goals: motivated to participate with therapy  Pain/Comfort:  Pain Rating 1: 4/10  Location - Side 1: Left  Location - Orientation 1: generalized  Location 1: knee  Pain Addressed 1: Reposition, Distraction      Objective:     Communicated with RN prior to session.  Patient found ambulatory in room/laughlin with peripheral IV and  and CNA present upon PT entry to room.     General Precautions: Standard, fall  Orthopedic Precautions: LLE weight bearing as tolerated  Braces: N/A  Respiratory Status: Room air      Functional Mobility:  Gait: The pt ambulated 200 ft with RW and SBA. Step-through gait pattern, fair pace. No LOB noted.  Stairs:  Pt ascended/descended 3 stair(s) and 4" curb step with Rolling Walker with left handrail with Contact Guard " Assistance.     Therapeutic Activities/Exercises:  The pt performed x 10 reps of total knee home exercise protocol on the LLE.    (In degrees) AROM PROM   L knee flexion 65 75   L knee extension 5 0       Education:  Patient and spouse were provided with verbal education and handouts education regarding PT role/goals/POC, post-op precautions, fall prevention, safety awareness, discharge/DME recommendations, and home exercise program.  Understanding was verbalized.     Patient left up in chair with all lines intact, call button in reach, RN notified, and spouse present    GOALS:   Multidisciplinary Problems       Physical Therapy Goals          Problem: Physical Therapy    Goal Priority Disciplines Outcome Interventions   Physical Therapy Goal     PT, PT/OT Progressing    Description: Pt will improve functional independence by performing:    Bed mobility: SBA  Sit to stand: SBA with rolling walker - MET  Bed to chair: SBA with Stand Step  with rolling walker   Car Transfer: SBA with rolling walker - MET  Ambulation x 200'  feet with SBA and rolling walker - MET  1 Step (Curb): Min A  and rolling walker MET  5 Steps: Min A  and B HR MET  left knee AROM flexion (in degrees): 90 MET  left knee AROM extension (in degrees): 0 MET  Independent with total knee HEP MET                         Time Tracking:     PT Received On:    PT Start Time: 0840     PT Stop Time: 0912  PT Total Time (min): 32 min     Billable Minutes: Therapeutic Activity 20 and Therapeutic Exercise 12    Treatment Type: Treatment  PT/PTA: PT     Number of PTA visits since last PT visit: 0     04/08/2025

## 2025-04-08 NOTE — NURSING
4/8/2025   11:08 AM    Nurse Note:   Discharge instructions given to patient and . Prescription for outpatient therapy given. Coverlets and mepilex given for home dressing changes. Stated understanding of all instructions.     Prior to discharge, the Patient/Support Person was educated and was able to verbalize understanding on following:    [x] Yes   [] No   [] Further Education Provided    Knee Replacement Specific Education   Pain management, Medication Management and Prescriptions  Activity level  Orthopedic precautions/braces - N/A  Complication Prevention to include: Constipation, post-op urinary retention, pneumonia, bleeding, falls, infection, DVT prophylaxis and nausea vomiting  Wound care Instructions/Bandages provided  Next dose due for pain and complication prevention medications      Perception of Care - Joint Replacement Population Total Joint Surgery List: KNEE    Patient able to verbalize one way to treat/prevent SWELLING at home   [x] Yes   [] No   [] Further Education Provided      Attending Nurse:  Trudy

## 2025-04-08 NOTE — PLAN OF CARE
Problem: Adult Inpatient Plan of Care  Goal: Plan of Care Review  Outcome: Ongoing  Goal: Patient-Specific Goal (Individualized)  Outcome: Ongoing  Goal: Absence of Hospital-Acquired Illness or Injury  Outcome: Ongoing  Goal: Optimal Comfort and Wellbeing  Outcome: Ongoing  Goal: Readiness for Transition of Care  Outcome: Ongoing     Problem: Wound  Goal: Optimal Coping  Outcome: Ongoing  Goal: Optimal Functional Ability  Outcome: Ongoing  Goal: Absence of Infection Signs and Symptoms  Outcome: Ongoing  Goal: Improved Oral Intake  Outcome: Ongoing  Goal: Optimal Pain Control and Function  Outcome: Ongoing  Goal: Skin Health and Integrity  Outcome: Ongoing  Goal: Optimal Wound Healing  Outcome: Ongoing     Problem: Infection  Goal: Absence of Infection Signs and Symptoms  Outcome: Ongoing     Problem: Comorbidity Management  Goal: Blood Pressure in Desired Range  Outcome: Ongoing     Problem: Knee Arthroplasty  Goal: Optimal Coping  Outcome: Ongoing  Goal: Absence of Bleeding  Outcome: Ongoing  Goal: Effective Bowel Elimination  Outcome: Ongoing  Goal: Fluid and Electrolyte Balance  Outcome: Ongoing  Goal: Optimal Functional Ability  Outcome: Ongoing  Goal: Absence of Infection Signs and Symptoms  Outcome: Ongoing  Goal: Intact Neurovascular Status  Outcome: Ongoing  Goal: Anesthesia/Sedation Recovery  Outcome: Ongoing  Goal: Optimal Pain Control and Function  Outcome: Ongoing  Goal: Nausea and Vomiting Relief  Outcome: Ongoing  Goal: Effective Urinary Elimination  Outcome: Ongoing  Goal: Effective Oxygenation and Ventilation  Outcome: Ongoing

## 2025-04-09 ENCOUNTER — TELEPHONE (OUTPATIENT)
Dept: ORTHOPEDICS | Facility: CLINIC | Age: 80
End: 2025-04-09
Payer: MEDICARE

## 2025-04-09 RX ORDER — ONDANSETRON 4 MG/1
4 TABLET, FILM COATED ORAL EVERY 6 HOURS PRN
Qty: 30 TABLET | Refills: 0 | Status: SHIPPED | OUTPATIENT
Start: 2025-04-09 | End: 2025-04-19

## 2025-04-09 NOTE — TELEPHONE ENCOUNTER
Ortho post op follow up call completed. (L TKA) Patient states nausea has improved with Zofran and she was able to keep her breakfast down. She has increased fluid consumption, wearing CADENCE hose and elevating left leg higher than her heart level. Reminded to use ice PRN swelling and post op follow up appointment is 4-22 at 9:15 with Dr Garcia, verbalized understanding. Encouraged to call with any questions or concerns.

## 2025-04-16 ENCOUNTER — TELEPHONE (OUTPATIENT)
Dept: ORTHOPEDICS | Facility: CLINIC | Age: 80
End: 2025-04-16
Payer: MEDICARE

## 2025-04-22 ENCOUNTER — OFFICE VISIT (OUTPATIENT)
Dept: ORTHOPEDICS | Facility: CLINIC | Age: 80
End: 2025-04-22
Payer: MEDICARE

## 2025-04-22 ENCOUNTER — HOSPITAL ENCOUNTER (OUTPATIENT)
Dept: RADIOLOGY | Facility: CLINIC | Age: 80
Discharge: HOME | End: 2025-04-22
Attending: ORTHOPAEDIC SURGERY
Payer: MEDICARE

## 2025-04-22 VITALS
HEIGHT: 62 IN | DIASTOLIC BLOOD PRESSURE: 74 MMHG | BODY MASS INDEX: 19.81 KG/M2 | WEIGHT: 107.63 LBS | SYSTOLIC BLOOD PRESSURE: 109 MMHG | HEART RATE: 107 BPM

## 2025-04-22 DIAGNOSIS — Z47.1 AFTERCARE FOLLOWING LEFT KNEE JOINT REPLACEMENT SURGERY: ICD-10-CM

## 2025-04-22 DIAGNOSIS — Z96.652 AFTERCARE FOLLOWING LEFT KNEE JOINT REPLACEMENT SURGERY: ICD-10-CM

## 2025-04-22 DIAGNOSIS — Z96.652 AFTERCARE FOLLOWING LEFT KNEE JOINT REPLACEMENT SURGERY: Primary | ICD-10-CM

## 2025-04-22 DIAGNOSIS — Z47.1 AFTERCARE FOLLOWING LEFT KNEE JOINT REPLACEMENT SURGERY: Primary | ICD-10-CM

## 2025-04-22 PROCEDURE — 73562 X-RAY EXAM OF KNEE 3: CPT | Mod: LT,,, | Performed by: ORTHOPAEDIC SURGERY

## 2025-04-22 PROCEDURE — 1101F PT FALLS ASSESS-DOCD LE1/YR: CPT | Mod: CPTII,,, | Performed by: ORTHOPAEDIC SURGERY

## 2025-04-22 PROCEDURE — 3288F FALL RISK ASSESSMENT DOCD: CPT | Mod: CPTII,,, | Performed by: ORTHOPAEDIC SURGERY

## 2025-04-22 PROCEDURE — 3078F DIAST BP <80 MM HG: CPT | Mod: CPTII,,, | Performed by: ORTHOPAEDIC SURGERY

## 2025-04-22 PROCEDURE — 3074F SYST BP LT 130 MM HG: CPT | Mod: CPTII,,, | Performed by: ORTHOPAEDIC SURGERY

## 2025-04-22 PROCEDURE — 99024 POSTOP FOLLOW-UP VISIT: CPT | Mod: ,,, | Performed by: ORTHOPAEDIC SURGERY

## 2025-04-22 PROCEDURE — 1159F MED LIST DOCD IN RCRD: CPT | Mod: CPTII,,, | Performed by: ORTHOPAEDIC SURGERY

## 2025-04-22 RX ORDER — ONDANSETRON 4 MG/1
4 TABLET, FILM COATED ORAL EVERY 8 HOURS PRN
COMMUNITY

## 2025-04-22 RX ORDER — HYDROCODONE BITARTRATE AND ACETAMINOPHEN 10; 325 MG/1; MG/1
1 TABLET ORAL EVERY 6 HOURS PRN
COMMUNITY

## 2025-04-22 RX ORDER — ONDANSETRON 4 MG/1
4 TABLET, ORALLY DISINTEGRATING ORAL EVERY 6 HOURS PRN
Qty: 30 TABLET | Refills: 1 | Status: SHIPPED | OUTPATIENT
Start: 2025-04-22 | End: 2025-05-07

## 2025-04-22 RX ORDER — KETOROLAC TROMETHAMINE 10 MG/1
10 TABLET, FILM COATED ORAL EVERY 6 HOURS
Qty: 20 TABLET | Refills: 0 | Status: SHIPPED | OUTPATIENT
Start: 2025-04-22 | End: 2025-04-27

## 2025-04-22 NOTE — PROGRESS NOTES
"  Chief Complaint:   Chief Complaint   Patient presents with    Post-op Evaluation     Lt TKA sx 4/7/25 - GL 7/6/25 - Pt presents with a walker and ace wrap on the knee. Pt taking NORCO 10mg 3x a day, Tylenol in between. Using cold compress and elevating. Therapy three times a week, stretching and walking at home. Pt having swelling in the Lt leg down to the feet. Reports no drainage from incision.        History of present illness:    History of Present Illness  The patient presents for evaluation of knee pain.    Persistent knee pain is reported, which is not alleviated by the current medication regimen. Hydrocodone 10/325 has been prescribed but causes nausea, even with the concurrent use of an antiemetic. Tylenol is also taken for pain management. Full leg extension is achieved while lying on the sofa. Physical therapy is ongoing, and exercises are performed at home. The antiemetic supply is nearing depletion and requires a refill. Taking the antiemetic approximately 5 to 10 minutes prior to hydrocodone administration prevents vomiting.    Past Medical History:   Diagnosis Date    Age-related nuclear cataract of both eyes 5/23/2022    Chronic idiopathic constipation 5/23/2022    Claustrophobia     " close doors"    Diverticulitis     hx    Dry eyes     Gastroesophageal reflux disease without esophagitis 5/23/2022    [place] (hard of hearing)     Left knee pain     Dr Godwin    Migraine headache 5/23/2022    inpast    Mixed hyperlipidemia 5/23/2022    ASCVD Score = 19.8%     Primary hypertension 5/23/2022    Renal stones     hx renal stones    Right hip pain     Right hip replacement 2019    Wears glasses        Past Surgical History:   Procedure Laterality Date    CHOLECYSTECTOMY      COLONOSCOPY      Dr Love 10/2021    DENTAL SURGERY      HEEL SPUR SURGERY Right     with pinning    mammogram 07/2021      [facility]    OOPHORECTOMY      OPEN REDUCTION AND INTERNAL FIXATION (ORIF) OF INJURY OF ELBOW " Bilateral 2020    ROBOTIC ARTHROPLASTY, KNEE Left 4/7/2025    Procedure: SDD- ROBOTIC ARTHROPLASTY, KNEE, TOTAL;  Surgeon: José Garcia MD;  [facility];  Service: Orthopedics;  Laterality: Left;    TOTAL HIP ARTHROPLASTY Right     2019       Current Outpatient Medications   Medication Sig    eszopiclone (LUNESTA) 3 mg Tab Take 1 tablet (3 mg total) by mouth nightly as needed (Insomnia).    ezetimibe (ZETIA) 10 mg tablet Take 1 tablet (10 mg total) by mouth once daily.    HYDROcodone-acetaminophen (NORCO)  mg per tablet Take 1 tablet by mouth every 6 (six) hours as needed for Pain.    Lactobacillus acidophilus (PROBIOTIC ACIDOPHILUS ORAL) Take 1 tablet by mouth Daily. Jose-biotic 1 daily    lisinopriL 10 MG tablet Take 1 tablet (10 mg total) by mouth once daily.    multivitamin/iron/folic acid (CENTRUM ORAL) Take 1 tablet by mouth Daily. By mouth daily    omeprazole (PRILOSEC) 40 MG capsule Take 1 capsule (40 mg total) by mouth once daily.    ondansetron (ZOFRAN) 4 MG tablet Take 4 mg by mouth every 8 (eight) hours as needed for Nausea.    RESTASIS 0.05 % ophthalmic emulsion Place 1 drop into both eyes 2 (two) times daily.    valACYclovir (VALTREX) 1000 MG tablet See Instructions, TAKE TWO TABLETS TWICE DAILY FOR 2 DOSES PRN FOR FEVER BLISTERS, # 10 tab(s), 3 Refill(s), Pharmacy: [facility], 157, cm, Height/Length Dosing, 01/27/22 8:56:00 CST, 51.95, kg, Weight Dosing, 01/27/22 8:56:00 CST     No current facility-administered medications for this visit.       Review of patient's allergies indicates:   Allergen Reactions    Penicillin g benzathine Hives     Hives 50 years ago    Statins-hmg-coa reductase inhibitors Other (See Comments)     Muscle Aches      Sulfamethoxazole      Other reaction(s): itcing, red splotches    Codeine Nausea And Vomiting       Family History   Problem Relation Name Age of Onset    Heart disease Mother      Heart disease Father         Social History[1]        Review  of Systems:    Constitution: Negative for chills, fever, and sweats.  Negative for unexplained weight loss.    HENT:  Negative for headaches and blurry vision.    Cardiovascular:Negative for chest pain or irregular heart beat. Negative for hypertension.    Respiratory:  Negative for cough and shortness of breath.    Gastrointestinal: Negative for abdominal pain, heartburn, melena, nausea, and vomitting.    Genitourinary:  Negative bladder incontinence and dysuria.    Musculoskeletal:  See HPI    Neurological: Negative for numbness.    Psychiatric/Behavioral: Negative for depression.  The patient is not nervous/anxious.      Endocrine: Negative for polyuria    Hematologic/Lymphatic: Negative for bleeding problem.  Does not bruise/bleed easily.    Skin: Negative for poor would healing and rash      Physical Examination:    Vital Signs:    Vitals:    04/22/25 0908   BP: 109/74   Pulse: 107       Body mass index is 19.68 kg/m².    General: No acute distress, alert and oriented, healthy appearing    HEENT: Head is atraumatic, mucous membranes are moist    Neck: Supples, no JVD    Cardiovascular: Palpable dorsalis pedis and posterior tibial pulses, regular rate and rhythm to those pulses    Lungs: Breathing non-labored    Skin: no rashes appreciated    Neurologic: Can flex and extend knees, ankles, and toes. Sensation is grossly intact    Left knee:  Patient's incision clean and dry.  Brisk cap refill distally.  Sensation intact distally.  Range of motion 0-90    X-rays:  Three views left knee reviewed.  Patient's implants appear well fixed.  No signs of loosening or subsidence noted.     Assessment::  Status post left total knee arthroplasty    Plan:  Refill her pain medication noted some anti-inflammatories.  See her back in a month.  Repeat x-rays of the left knee when she returns.    This note was generated with the assistance of ambient listening technology. Verbal consent was obtained by the patient and accompanying  visitor(s) for the recording of patient appointment to facilitate this note. I attest to having reviewed and edited the generated note for accuracy, though some syntax or spelling errors may persist. Please contact the author of this note for any clarification.      This note was created using [employer] voice recognition software that occasionally misinterpreted phrases or words.    Consult note is delivered via Epic messaging service.         [1]   Social History  Socioeconomic History    Marital status:    Tobacco Use    Smoking status: Never     Passive exposure: Never    Smokeless tobacco: Never   Substance and Sexual Activity    Alcohol use: Yes     Comment: 1-2 glasses of wine per year    Drug use: Yes     Types: Hydrocodone    Sexual activity: Yes     Partners: Male     Comment:    Social History Narrative    ** Merged History Encounter **         ** Merged History Encounter **          Social Drivers of Health     Financial Resource Strain: Low Risk  (4/1/2025)    [facility]    Overall Financial Resource Strain (CARDIA)     Difficulty of Paying Living Expenses: Not hard at all   Food Insecurity: No Food Insecurity (4/1/2025)    [facility]    Hunger Vital Sign     Worried About Running Out of Food in the Last Year: Never true     Ran Out of Food in the Last Year: Never true   Transportation Needs: No Transportation Needs (4/1/2025)    [facility]    PRAPARE - Transportation     Lack of Transportation (Medical): No     Lack of Transportation (Non-Medical): No   Physical Activity: Sufficiently Active (4/1/2025)    [facility]    Exercise Vital Sign      Days of Exercise per Week: 7 days     Minutes of Exercise per Session: 150+ min   Stress: No Stress Concern Present (4/1/2025)    Received from [facility] and Its Subsidiaries and Affiliates    [employer] [place] [employer] - Occupational Stress Questionnaire     Feeling of Stress : Not at all   Housing Stability: Low Risk  (4/1/2025)    Received from [facility] Its Subsidiaries and Affiliates    Housing Stability Vital Sign     Unable to Pay for Housing in the Last Year: No     Number of Times Moved in the Last Year: 0     Homeless in the Last Year: No

## 2025-04-28 DIAGNOSIS — Z96.652 AFTERCARE FOLLOWING LEFT KNEE JOINT REPLACEMENT SURGERY: Primary | ICD-10-CM

## 2025-04-28 DIAGNOSIS — Z47.1 AFTERCARE FOLLOWING LEFT KNEE JOINT REPLACEMENT SURGERY: Primary | ICD-10-CM

## 2025-04-28 RX ORDER — HYDROCODONE BITARTRATE AND ACETAMINOPHEN 10; 325 MG/1; MG/1
1 TABLET ORAL EVERY 6 HOURS PRN
Qty: 28 TABLET | Refills: 0 | Status: SHIPPED | OUTPATIENT
Start: 2025-04-28

## 2025-05-01 DIAGNOSIS — Z47.1 AFTERCARE FOLLOWING LEFT KNEE JOINT REPLACEMENT SURGERY: Primary | ICD-10-CM

## 2025-05-01 DIAGNOSIS — Z96.652 AFTERCARE FOLLOWING LEFT KNEE JOINT REPLACEMENT SURGERY: Primary | ICD-10-CM

## 2025-05-01 RX ORDER — OXYCODONE AND ACETAMINOPHEN 5; 325 MG/1; MG/1
1 TABLET ORAL EVERY 4 HOURS PRN
Qty: 28 TABLET | Refills: 0 | Status: SHIPPED | OUTPATIENT
Start: 2025-05-01

## 2025-05-02 ENCOUNTER — TELEPHONE (OUTPATIENT)
Dept: ORTHOPEDICS | Facility: CLINIC | Age: 80
End: 2025-05-02
Payer: MEDICARE

## 2025-05-02 NOTE — TELEPHONE ENCOUNTER
Patients physical therapist (Tara) called and lvm with concerns regarding patient not be able to tolerate physical therapy and having an increase in pain.    Per. Dr. Garcia, patient needs to be seen in office to be reevaluated. Returned call to physical therapist to inform her of this.    Spoke with patient and spouse to schedule appointment. Patient and spouse aware of upcoming appointment date and time and voiced a clear understanding.

## 2025-05-06 ENCOUNTER — OFFICE VISIT (OUTPATIENT)
Dept: ORTHOPEDICS | Facility: CLINIC | Age: 80
End: 2025-05-06
Payer: MEDICARE

## 2025-05-06 VITALS
WEIGHT: 107 LBS | HEART RATE: 101 BPM | SYSTOLIC BLOOD PRESSURE: 140 MMHG | BODY MASS INDEX: 19.69 KG/M2 | DIASTOLIC BLOOD PRESSURE: 83 MMHG | HEIGHT: 62 IN

## 2025-05-06 DIAGNOSIS — Z47.1 AFTERCARE FOLLOWING LEFT KNEE JOINT REPLACEMENT SURGERY: Primary | ICD-10-CM

## 2025-05-06 DIAGNOSIS — Z96.652 AFTERCARE FOLLOWING LEFT KNEE JOINT REPLACEMENT SURGERY: Primary | ICD-10-CM

## 2025-05-06 RX ORDER — METHYLPREDNISOLONE 4 MG/1
TABLET ORAL
Qty: 21 EACH | Refills: 0 | Status: SHIPPED | OUTPATIENT
Start: 2025-05-06 | End: 2025-05-27

## 2025-05-06 NOTE — PROGRESS NOTES
Chief Complaint:   Chief Complaint   Patient presents with    Left Knee - Follow-up      Lt TKA Sx 4/07/25-GL 7/06/25 - Ambulating with a walker. Reports increased pain. Physical therapy twice a week, couldn't take three days a week. Two tylenol before therapy and Norco 10mg Q4H. Reporting nausea and constipation. Constant swelling from knee to the ankle, using cold compress and elevation. Incision is pulling on the skin.        History of present illness:    History of Present Illness  The patient presents for evaluation of left knee pain.    She reports persistent, diffuse pain in her left knee, which is also swollen. The pain is described as constant and severe. Despite the pain, she has been actively working on improving her range of motion at home through various exercises, including walking and using a towel for back-and-forth movements. She also attempts to apply pressure on the knee by placing it on a box. Her current medication regimen includes hydrocodone every 6 hours and Aleve. She has experienced adverse reactions to ibuprofen and muscle relaxants, including nausea and vomiting. She expresses concern about the potential presence of codeine in her prescribed medications. She notes that her pain level remains unchanged from a few weeks ago, but she acknowledges an improvement in her mobility. She has been able to put on her shoes for the first time in 3 days. She also takes a sleeping pill at night to manage her pain.    Additionally, she reports experiencing constipation, which she attributes to her narcotic medication. Initially, she managed this with oral stool softeners and increased fluid intake, but due to severe abdominal pain, she switched to using suppositories and Fleet enemas, which have been effective in promoting bowel movements.    She also reports experiencing pain in her left knee, which she believes is due to compensatory overuse. She is interested in exploring the use of special braces  "for support.    PAST SURGICAL HISTORY:  left knee surgery on 04/2025    SOCIAL HISTORY  Exercise: Walking and exercises at home    Past Medical History:   Diagnosis Date    Age-related nuclear cataract of both eyes 5/23/2022    Chronic idiopathic constipation 5/23/2022    Claustrophobia     " close doors"    Diverticulitis     hx    Dry eyes     Gastroesophageal reflux disease without esophagitis 5/23/2022    [place] (hard of hearing)     Left knee pain     Dr Godwin    Migraine headache 5/23/2022    inpast    Mixed hyperlipidemia 5/23/2022    ASCVD Score = 19.8%     Primary hypertension 5/23/2022    Renal stones     hx renal stones    Right hip pain     Right hip replacement 2019    Wears glasses        Past Surgical History:   Procedure Laterality Date    CHOLECYSTECTOMY      COLONOSCOPY      Dr Love 10/2021    DENTAL SURGERY      HEEL SPUR SURGERY Right     with pinning    [facility]    OOPHORECTOMY      OPEN REDUCTION AND INTERNAL FIXATION (ORIF) OF INJURY OF ELBOW Bilateral 2020    ROBOTIC ARTHROPLASTY, KNEE Left 4/7/2025    Procedure: SDD- ROBOTIC ARTHROPLASTY, KNEE, TOTAL;  Surgeon: José Garcia MD;  Location: [facility];  Service: Orthopedics;  Laterality: Left;    TOTAL HIP ARTHROPLASTY Right     2019       Current Outpatient Medications   Medication Sig    eszopiclone (LUNESTA) 3 mg Tab Take 1 tablet (3 mg total) by mouth nightly as needed (Insomnia).    ezetimibe (ZETIA) 10 mg tablet Take 1 tablet (10 mg total) by mouth once daily.    HYDROcodone-acetaminophen (NORCO)  mg per tablet Take 1 tablet by mouth every 6 (six) hours as needed for Pain.    Lactobacillus acidophilus (PROBIOTIC ACIDOPHILUS ORAL) Take 1 tablet by mouth Daily. Jose-biotic 1 daily    lisinopriL 10 MG tablet Take 1 tablet (10 mg total) by mouth once daily.    multivitamin/iron/folic acid (CENTRUM ORAL) Take 1 tablet by mouth Daily. By mouth daily    omeprazole (PRILOSEC) 40 MG capsule Take 1 " capsule (40 mg total) by mouth once daily.    ondansetron (ZOFRAN-ODT) 4 MG TbDL Take 1 tablet (4 mg total) by mouth every 6 (six) hours as needed (nausea/vomiting).    valACYclovir (VALTREX) 1000 MG tablet See Instructions, TAKE TWO TABLETS TWICE DAILY FOR 2 DOSES PRN FOR FEVER BLISTERS, # 10 tab(s), 3 Refill(s), Pharmacy: [facility], 157, cm, Height/Length Dosing, 01/27/22 8:56:00 CST, 51.95, kg, Weight Dosing, 01/27/22 8:56:00 CST    methylPREDNISolone (MEDROL DOSEPACK) 4 mg tablet use as directed    ondansetron (ZOFRAN) 4 MG tablet Take 4 mg by mouth every 8 (eight) hours as needed for Nausea. (Patient not taking: Reported on 5/6/2025)    oxyCODONE-acetaminophen (PERCOCET) 5-325 mg per tablet Take 1 tablet by mouth every 4 (four) hours as needed for Pain. (Patient not taking: Reported on 5/6/2025)    RESTASIS 0.05 % ophthalmic emulsion Place 1 drop into both eyes 2 (two) times daily. (Patient not taking: Reported on 5/6/2025)     No current facility-administered medications for this visit.       Review of patient's allergies indicates:   Allergen Reactions    Penicillin g benzathine Hives     Hives 50 years ago    Statins-hmg-coa reductase inhibitors Other (See Comments)     Muscle Aches      Sulfamethoxazole      Other reaction(s): itcing, red splotches    Codeine Nausea And Vomiting       Family History   Problem Relation Name Age of Onset    Heart disease Mother      Heart disease Father         Social History[1]        Review of Systems:    Constitution: Negative for chills, fever, and sweats.  Negative for unexplained weight loss.    HENT:  Negative for headaches and blurry vision.    Cardiovascular:Negative for chest pain or irregular heart beat. Negative for hypertension.    Respiratory:  Negative for cough and shortness of breath.    Gastrointestinal: Negative for abdominal pain, heartburn, melena, nausea, and vomitting.    Genitourinary:  Negative bladder incontinence and  dysuria.    Musculoskeletal:  See HPI    Neurological: Negative for numbness.    Psychiatric/Behavioral: Negative for depression.  The patient is not nervous/anxious.      Endocrine: Negative for polyuria    Hematologic/Lymphatic: Negative for bleeding problem.  Does not bruise/bleed easily.    Skin: Negative for poor would healing and rash      Physical Examination:    Vital Signs:    Vitals:    05/06/25 1014   BP: (!) 140/83   Pulse: 101       Body mass index is 19.57 kg/m².    General: No acute distress, alert and oriented, healthy appearing    HEENT: Head is atraumatic, mucous membranes are moist    Neck: Supples, no JVD    Cardiovascular: Palpable dorsalis pedis and posterior tibial pulses, regular rate and rhythm to those pulses    Lungs: Breathing non-labored    Skin: no rashes appreciated    Neurologic: Can flex and extend knees, ankles, and toes. Sensation is grossly intact    Left knee:  Patient gets full extension.  Flexion to about  degrees.  Incision is clean and dry.  No significant erythema    X-rays:      Assessment::  Status post left total knee arthroplasty    Plan:  The patient is making progress although she is somewhat behind.  It had most of this is inflammatory changes.  She is unable to tolerate much medication because of her stomach.  We will give her some steroids today to try to calm this down.  See him back in a few weeks.  Patient is going to has a strict bowel regimen    This note was generated with the assistance of ambient listening technology. Verbal consent was obtained by the patient and accompanying visitor(s) for the recording of patient appointment to facilitate this note. I attest to having reviewed and edited the generated note for accuracy, though some syntax or spelling errors may persist. Please contact the author of this note for any clarification.      This note was created using [employer] voice recognition software that occasionally misinterpreted phrases or  words.    Consult note is delivered via Epic messaging service.         [1]   Social History  Socioeconomic History    Marital status:    Tobacco Use    Smoking status: Never     Passive exposure: Never    Smokeless tobacco: Never   Substance and Sexual Activity    Alcohol use: Yes     Comment: 1-2 glasses of wine per year    Drug use: Yes     Types: Hydrocodone    Sexual activity: Yes     Partners: Male     Comment:    Social History Narrative    ** Merged History Encounter **         ** Merged History Encounter **          Social Drivers of Health     Financial Resource Strain: Low Risk  (4/1/2025)    [facility]    Overall Financial Resource Strain (CARDIA)     Difficulty of Paying Living Expenses: Not hard at all   Food Insecurity: No Food Insecurity (4/1/2025)    [facility]    Hunger Vital Sign     Worried About Running Out of Food in the Last Year: Never true     Ran Out of Food in the Last Year: Never true   Transportation Needs: No Transportation Needs (4/1/2025)    [facility]    PRAPARE - Transportation     Lack of Transportation (Medical): No     Lack of Transportation (Non-Medical): No   Physical Activity: Sufficiently Active (4/1/2025)    [facility]    Exercise Vital Sign     Days of Exercise per Week: 7 days     Minutes of Exercise per Session: 150+ min   Stress: No Stress Concern Present (4/1/2025)    Received from [facility] and Aff[facility]ates    [employer] [employer] [employer] - Occupational Stress Questionnaire     Feeling of Stress : Not at all   Housing Stability: Low  Risk  (4/1/2025)    [facility] and Affiliates    Housing Stability Vital Sign     Unable to Pay for Housing in the Last Year: No     Number of Times Moved in the Last Year: 0     Homeless in the Last Year: No

## 2025-05-08 ENCOUNTER — PATIENT MESSAGE (OUTPATIENT)
Dept: ORTHOPEDICS | Facility: CLINIC | Age: 80
End: 2025-05-08

## 2025-05-29 ENCOUNTER — OFFICE VISIT (OUTPATIENT)
Dept: ORTHOPEDICS | Facility: CLINIC | Age: 80
End: 2025-05-29
Payer: MEDICARE

## 2025-05-29 VITALS
HEIGHT: 62 IN | WEIGHT: 105 LBS | HEART RATE: 102 BPM | DIASTOLIC BLOOD PRESSURE: 88 MMHG | SYSTOLIC BLOOD PRESSURE: 135 MMHG | BODY MASS INDEX: 19.32 KG/M2

## 2025-05-29 DIAGNOSIS — Z47.1 AFTERCARE FOLLOWING LEFT KNEE JOINT REPLACEMENT SURGERY: Primary | ICD-10-CM

## 2025-05-29 DIAGNOSIS — Z96.652 AFTERCARE FOLLOWING LEFT KNEE JOINT REPLACEMENT SURGERY: Primary | ICD-10-CM

## 2025-05-29 RX ORDER — GABAPENTIN 300 MG/1
300 CAPSULE ORAL 2 TIMES DAILY
Qty: 60 CAPSULE | Refills: 0 | Status: SHIPPED | OUTPATIENT
Start: 2025-05-29

## 2025-05-29 RX ORDER — ACETAMINOPHEN 325 MG/1
325 TABLET ORAL EVERY 6 HOURS PRN
COMMUNITY

## 2025-05-29 RX ORDER — MELOXICAM 15 MG/1
15 TABLET ORAL DAILY
Qty: 30 TABLET | Refills: 2 | Status: SHIPPED | OUTPATIENT
Start: 2025-05-29

## 2025-05-29 RX ORDER — NAPROXEN SODIUM 220 MG
220 TABLET ORAL 2 TIMES DAILY WITH MEALS
COMMUNITY

## 2025-05-29 NOTE — PROGRESS NOTES
Chief Complaint:   Chief Complaint   Patient presents with    Left Knee - Follow-up     7wks out Lt TKA Sx 4/7/25-7/6/25 patient states she hurts it feels swollen she have her walker. She is in PT 2xs a wk and she does at home 2xs a day some times. The hydrocodone is not helping and it was making her bowels hard. She can't lift her leg and she has stinging pain and she had a knot on the left side of her knee last week it lasted a few days. She can't bend her knee back.       History of present illness:    History of Present Illness  The patient presents for evaluation of left knee pain.    She reports persistent pain in her left knee, which has been present for several weeks. The pain is so severe that it prevents her from lifting her foot off the ground. She experiences a stinging sensation when attempting to lift her leg and finds it difficult to bend her knee backward due to the associated pain. Additionally, she reports a burning sensation in her foot at night. She is currently attending physical therapy sessions twice a week but had to miss the last session due to the severity of her pain. She has been using Voltaren gel for topical application on her knee. She has been managing her pain with extra strength Tylenol and Advil, but even these medications have caused her to vomit. She has previously tried meloxicam and Robaxin but discontinued them due to intolerance. She has also tried a course of steroids but does not recall if they were beneficial. She has requested a knee brace for additional support.    She experienced severe vomiting during her hospital stay, which continued for 2 weeks after discharge, resulting in a weight loss of 5 to 6 pounds. She also experienced constipation while on hydrocodone and anti-nausea medication, which resolved after 2 weeks.    Past Medical History:   Diagnosis Date    Age-related nuclear cataract of both eyes 5/23/2022    Chronic idiopathic constipation 5/23/2022     "Claustrophobia     " close doors"    Diverticulitis     hx    Dry eyes     Gastroesophageal reflux disease without esophagitis 5/23/2022    [place] (hard of hearing)     Left knee pain     Dr Godwin    Migraine headache 5/23/2022    inpast    Mixed hyperlipidemia 5/23/2022    ASCVD Score = 19.8%     Primary hypertension 5/23/2022    Renal stones     hx renal stones    Right hip pain     Right hip replacement 2019    Wears glasses        Past Surgical History:   Procedure Laterality Date    CHOLECYSTECTOMY      COLONOSCOPY      Dr Love 10/2021    DENTAL SURGERY      HEEL SPUR SURGERY Right     with pinning    mammogram 07/2021      [facility]    OOPHORECTOMY      OPEN REDUCTION AND INTERNAL FIXATION (ORIF) OF INJURY OF ELBOW Bilateral 2020    ROBOTIC ARTHROPLASTY, KNEE Left 4/7/2025    Procedure: SDD- ROBOTIC ARTHROPLASTY, KNEE, TOTAL;  Surgeon: José Garcia MD;  Location: [facility];  Service: Orthopedics;  Laterality: Left;    TOTAL HIP ARTHROPLASTY Right     2019       Current Outpatient Medications   Medication Sig    acetaminophen (TYLENOL) 325 MG tablet Take 325 mg by mouth every 6 (six) hours as needed for Pain.    eszopiclone (LUNESTA) 3 mg Tab Take 1 tablet (3 mg total) by mouth nightly as needed (Insomnia).    ezetimibe (ZETIA) 10 mg tablet Take 1 tablet (10 mg total) by mouth once daily.    Lactobacillus acidophilus (PROBIOTIC ACIDOPHILUS ORAL) Take 1 tablet by mouth Daily. Jose-biotic 1 daily    lisinopriL 10 MG tablet Take 1 tablet (10 mg total) by mouth once daily.    multivitamin/iron/folic acid (CENTRUM ORAL) Take 1 tablet by mouth Daily. By mouth daily    naproxen sodium (ANAPROX) 220 MG tablet Take 220 mg by mouth 2 (two) times daily with meals.    omeprazole (PRILOSEC) 40 MG capsule Take 1 capsule (40 mg total) by mouth once daily.    RESTASIS 0.05 % ophthalmic emulsion Place 1 drop into both eyes 2 (two) times daily.    valACYclovir (VALTREX) 1000 MG tablet See Instructions, TAKE TWO " TABLETS TWICE DAILY FOR 2 DOSES PRN FOR FEVER BLISTERS, # 10 tab(s), 3 Refill(s), Pharmacy: [facility], 157, cm, Height/Length Dosing, 01/27/22 8:56:00 CST, 51.95, kg, Weight Dosing, 01/27/22 8:56:00 CST    gabapentin (NEURONTIN) 300 MG capsule Take 1 capsule (300 mg total) by mouth 2 (two) times daily.    HYDROcodone-acetaminophen (NORCO)  mg per tablet Take 1 tablet by mouth every 6 (six) hours as needed for Pain.    meloxicam (MOBIC) 15 MG tablet Take 1 tablet (15 mg total) by mouth once daily.    ondansetron (ZOFRAN) 4 MG tablet Take 4 mg by mouth every 8 (eight) hours as needed for Nausea. (Patient not taking: Reported on 5/6/2025)    oxyCODONE-acetaminophen (PERCOCET) 5-325 mg per tablet Take 1 tablet by mouth every 4 (four) hours as needed for Pain. (Patient not taking: Reported on 5/6/2025)     No current facility-administered medications for this visit.       Review of patient's allergies indicates:   Allergen Reactions    Penicillin g benzathine Hives     Hives 50 years ago    Statins-hmg-coa reductase inhibitors Other (See Comments)     Muscle Aches      Sulfamethoxazole      Other reaction(s): itcing, red splotches    Codeine Nausea And Vomiting       Family History   Problem Relation Name Age of Onset    Heart disease Mother      Heart disease Father         Social History[1]        Review of Systems:    Constitution: Negative for chills, fever, and sweats.  Negative for unexplained weight loss.    HENT:  Negative for headaches and blurry vision.    Cardiovascular:Negative for chest pain or irregular heart beat. Negative for hypertension.    Respiratory:  Negative for cough and shortness of breath.    Gastrointestinal: Negative for abdominal pain, heartburn, melena, nausea, and vomitting.    Genitourinary:  Negative bladder incontinence and dysuria.    Musculoskeletal:  See HPI    Neurological: Negative for numbness.    Psychiatric/Behavioral: Negative for depression.  The patient is not  nervous/anxious.      Endocrine: Negative for polyuria    Hematologic/Lymphatic: Negative for bleeding problem.  Does not bruise/bleed easily.    Skin: Negative for poor would healing and rash      Physical Examination:    Vital Signs:    Vitals:    05/29/25 1423   BP: 135/88   Pulse: 102       Body mass index is 19.2 kg/m².    General: No acute distress, alert and oriented, healthy appearing    HEENT: Head is atraumatic, mucous membranes are moist    Neck: Supples, no JVD    Cardiovascular: Palpable dorsalis pedis and posterior tibial pulses, regular rate and rhythm to those pulses    Lungs: Breathing non-labored    Skin: no rashes appreciated    Neurologic: Can flex and extend knees, ankles, and toes. Sensation is grossly intact    Left knee:  Patient's incision is clean and dry.  Her knee is not an plane significantly.  Range of motion in his poor.  She is only able to extend to about 10 flex to about 80 today which it had worse than what she was with the last visit.  Her knee is significantly hypersensitive.  It had knee light touch of the knee causes her significant discomfort and pain.    X-rays:  Three views of the left knee reviewed.  Patient's implants appear well fixed.  No signs of loosening or subsidence noted.  It had     Assessment::  Status post left total knee arthroplasty, CRPS    Plan:  Patient is significantly hypersensitive.  It had going to place her on some Neurontin although she has had issues with tolerating medication recently.  We will also place her on some anti-inflammatories to try to calm this knee down.  She is headed for a manipulation.  We will see him back next week to check her range of motion in his hopefully her sensitivity activity as calmed down.    This note was generated with the assistance of ambient listening technology. Verbal consent was obtained by the patient and accompanying visitor(s) for the recording of patient appointment to facilitate this note. I attest to having  reviewed and edited the generated note for accuracy, though some syntax or spelling errors may persist. Please contact the author of this note for any clarification.      This note was created using [employer] voice recognition software that occasionally misinterpreted phrases or words.    Consult note is delivered via Epic messaging service.         [1]   Social History  Socioeconomic History    Marital status:    Tobacco Use    Smoking status: Never     Passive exposure: Never    Smokeless tobacco: Never   Substance and Sexual Activity    Alcohol use: Yes     Comment: 1-2 glasses of wine per year    Drug use: Yes     Types: Hydrocodone    Sexual activity: Yes     Partners: Male     Comment:    Social History Narrative    ** Merged History Encounter **         ** Merged History Encounter **          Social Drivers of Health     Financial Resource Strain: Low Risk  (4/1/2025)    [facility]    Overall Financial Resource Strain (CARDIA)     Difficulty of Paying Living Expenses: Not hard at all   Food Insecurity: No Food Insecurity (4/1/2025)    [facility]    Hunger Vital Sign     Worried About Running Out of Food in the Last Year: Never true     Ran Out of Food in the Last Year: Never true   Transportation Needs: No Transportation Needs (4/1/2025)    [facility]    PRAPARE - Transportation     Lack of Transportation (Medical): No     Lack of Transportation (Non-Medical): No   Physical Activity: Sufficiently Active (4/1/2025)    [facility]    Exercise Vital Sign     Days of Exercise per Week: 7 days     Minutes of Exercise per Session: 150+ min   Stress: No  Stress Concern Present (4/1/2025)    [facility]idi[facility] and Affiliates    [place] of [employer]ational [employer] - Occupational Stress Questionnaire     Feeling of Stress : Not at all   Housing Stability: Low Risk  (4/1/2025)    Received from [facility] and Its Subsidiaries and Affiliates    Housing Stability Vital Sign     Unable to Pay for Housing in the Last Year: No     Number of Times Moved in the Last Year: 0     Homeless in the Last Year: No

## 2025-06-05 ENCOUNTER — OFFICE VISIT (OUTPATIENT)
Dept: ORTHOPEDICS | Facility: CLINIC | Age: 80
End: 2025-06-05
Payer: MEDICARE

## 2025-06-05 VITALS
HEIGHT: 62 IN | SYSTOLIC BLOOD PRESSURE: 156 MMHG | DIASTOLIC BLOOD PRESSURE: 82 MMHG | HEART RATE: 89 BPM | BODY MASS INDEX: 19.32 KG/M2 | WEIGHT: 105 LBS

## 2025-06-05 DIAGNOSIS — Z47.1 AFTERCARE FOLLOWING LEFT KNEE JOINT REPLACEMENT SURGERY: Primary | ICD-10-CM

## 2025-06-05 DIAGNOSIS — Z96.652 AFTERCARE FOLLOWING LEFT KNEE JOINT REPLACEMENT SURGERY: Primary | ICD-10-CM

## 2025-06-05 PROCEDURE — 3079F DIAST BP 80-89 MM HG: CPT | Mod: CPTII,,, | Performed by: ORTHOPAEDIC SURGERY

## 2025-06-05 PROCEDURE — 1125F AMNT PAIN NOTED PAIN PRSNT: CPT | Mod: CPTII,,, | Performed by: ORTHOPAEDIC SURGERY

## 2025-06-05 PROCEDURE — 1159F MED LIST DOCD IN RCRD: CPT | Mod: CPTII,,, | Performed by: ORTHOPAEDIC SURGERY

## 2025-06-05 PROCEDURE — 99024 POSTOP FOLLOW-UP VISIT: CPT | Mod: ,,, | Performed by: ORTHOPAEDIC SURGERY

## 2025-06-05 PROCEDURE — 3077F SYST BP >= 140 MM HG: CPT | Mod: CPTII,,, | Performed by: ORTHOPAEDIC SURGERY

## 2025-06-09 ENCOUNTER — PATIENT MESSAGE (OUTPATIENT)
Dept: ORTHOPEDICS | Facility: CLINIC | Age: 80
End: 2025-06-09
Payer: MEDICARE

## 2025-06-19 ENCOUNTER — OFFICE VISIT (OUTPATIENT)
Dept: ORTHOPEDICS | Facility: CLINIC | Age: 80
End: 2025-06-19
Payer: MEDICARE

## 2025-06-19 VITALS
SYSTOLIC BLOOD PRESSURE: 151 MMHG | HEIGHT: 62 IN | HEART RATE: 87 BPM | WEIGHT: 107.38 LBS | BODY MASS INDEX: 19.76 KG/M2 | DIASTOLIC BLOOD PRESSURE: 80 MMHG

## 2025-06-19 DIAGNOSIS — Z47.1 AFTERCARE FOLLOWING LEFT KNEE JOINT REPLACEMENT SURGERY: Primary | ICD-10-CM

## 2025-06-19 DIAGNOSIS — Z96.652 AFTERCARE FOLLOWING LEFT KNEE JOINT REPLACEMENT SURGERY: Primary | ICD-10-CM

## 2025-06-19 RX ORDER — DICLOFENAC SODIUM 50 MG/1
50 TABLET, DELAYED RELEASE ORAL 2 TIMES DAILY
COMMUNITY

## 2025-06-19 RX ORDER — GABAPENTIN 300 MG/1
300 CAPSULE ORAL 3 TIMES DAILY
Qty: 90 CAPSULE | Refills: 0 | Status: SHIPPED | OUTPATIENT
Start: 2025-06-19

## 2025-06-19 RX ORDER — MEPERIDINE HYDROCHLORIDE 50 MG/1
25 TABLET ORAL EVERY 6 HOURS PRN
Qty: 5 TABLET | Refills: 0 | Status: SHIPPED | OUTPATIENT
Start: 2025-06-19

## 2025-06-19 NOTE — PROGRESS NOTES
Chief Complaint:   Chief Complaint   Patient presents with    Left Knee - Follow-up     2 months sp Lt TKA Sx 4/07/25-GL 7/06/25, patient reports she hurts all the time, exercising at PT 2 times a wee, can't take any pain med due to n/v, ambulates with cane, present with her  (dana)       History of present illness:    History of Present Illness  The patient presents for evaluation of knee pain.    She reports experiencing vomiting, which she attributes to her medication regimen. She has been abstaining from painkillers since 04/07/2025. In an attempt to alleviate her discomfort and facilitate sleep, she took an antiemetic followed by a full dose of a painkiller after a brief interval of 5 to 10 minutes. However, this resulted in poor sleep quality characterized by restlessness and the need for positional adjustments using a pillow between her legs. She also experienced vomiting the following morning. She expresses a desire to avoid pain medications due to their adverse effects on her.    She has been diligent in her physical therapy exercises, including walking, swinging, and pushing movements, as well as using a belt for support. She has also incorporated leg wrapping with an Ace bandage into her routine. She has previously tried tramadol without success and has a known intolerance to codeine. She is seeking alternative pain management strategies and is considering trying Demerol. She is also requesting a refill of her gabapentin prescription. She has been applying ice to her knee and has attempted to manage the swelling with an Ace bandage. Despite these efforts, she continues to experience significant knee pain, which she describes as severe.    She has been making steady progress with her knee exercises, which she finds beneficial. She has been tolerating gabapentin well, although it initially caused some dizziness. She has been unable to take pain medication due to intolerance but has found relief  "from a previous steroid injection. She has been able to ascend and descend 22 steps at home with the aid of a railing. She is able to sit and kneel, although with some difficulty. She has been performing self-massage on her knee and has been diligent in her physical therapy exercises, including walking, swinging, and pushing movements, as well as using a belt for support. She has been applying ice to her knee and has attempted to manage the swelling with an Ace bandage.    She has noticed weight loss, with her current weight being 107 pounds, down from her usual 112 pounds. She has been maintaining adequate hydration and nutrition, including daily consumption of Ensure.    SOCIAL HISTORY  Exercise: Exercises regularly, including walking and various physical therapy exercises.  Diet: Consumes Ensure daily.    Past Medical History:   Diagnosis Date    Age-related nuclear cataract of both eyes 5/23/2022    Chronic idiopathic constipation 5/23/2022    Claustrophobia     " close doors"    Diverticulitis     hx    Dry eyes     Gastroesophageal reflux disease without esophagitis 5/23/2022    [place] (hard of hearing)     Left knee pain     Dr Godwin    Migraine headache 5/23/2022    inpast    Mixed hyperlipidemia 5/23/2022    ASCVD Score = 19.8%     Primary hypertension 5/23/2022    Renal stones     hx renal stones    Right hip pain     Right hip replacement 2019    Wears glasses        Past Surgical History:   Procedure Laterality Date    CHOLECYSTECTOMY      COLONOSCOPY      Dr Love 10/2021    DENTAL SURGERY      HEEL SPUR SURGERY Right     with pinning    mammogram 07/2021      [facility]    OOPHORECTOMY      OPEN REDUCTION AND INTERNAL FIXATION (ORIF) OF INJURY OF ELBOW Bilateral 2020    ROBOTIC ARTHROPLASTY, KNEE Left 4/7/2025    Procedure: SDD- ROBOTIC ARTHROPLASTY, KNEE, TOTAL;  Surgeon: José Garcia MD;  Location: [facility];  Service: Orthopedics;  Laterality: Left;    TOTAL HIP ARTHROPLASTY Right     " 2019       Current Outpatient Medications   Medication Sig    acetaminophen (TYLENOL) 325 MG tablet Take 325 mg by mouth every 6 (six) hours as needed for Pain.    diclofenac (VOLTAREN) 50 MG EC tablet Take 50 mg by mouth 2 (two) times daily.    eszopiclone (LUNESTA) 3 mg Tab Take 1 tablet (3 mg total) by mouth nightly as needed (Insomnia).    ezetimibe (ZETIA) 10 mg tablet Take 1 tablet (10 mg total) by mouth once daily.    gabapentin (NEURONTIN) 300 MG capsule Take 1 capsule (300 mg total) by mouth 2 (two) times daily.    Lactobacillus acidophilus (PROBIOTIC ACIDOPHILUS ORAL) Take 1 tablet by mouth Daily. Jose-biotic 1 daily    lisinopriL 10 MG tablet Take 1 tablet (10 mg total) by mouth once daily.    meloxicam (MOBIC) 15 MG tablet Take 1 tablet (15 mg total) by mouth once daily.    multivitamin/iron/folic acid (CENTRUM ORAL) Take 1 tablet by mouth Daily. By mouth daily    naproxen sodium (ANAPROX) 220 MG tablet Take 220 mg by mouth 2 (two) times daily with meals.    omeprazole (PRILOSEC) 40 MG capsule Take 1 capsule (40 mg total) by mouth once daily.    RESTASIS 0.05 % ophthalmic emulsion Place 1 drop into both eyes 2 (two) times daily.    valACYclovir (VALTREX) 1000 MG tablet See Instructions, TAKE TWO TABLETS TWICE DAILY FOR 2 DOSES PRN FOR FEVER BLISTERS, # 10 tab(s), 3 Refill(s), Pharmacy: [facility], 157, cm, Height/Length Dosing, 01/27/22 8:56:00 CST, 51.95, kg, Weight Dosing, 01/27/22 8:56:00 CST    gabapentin (NEURONTIN) 300 MG capsule Take 1 capsule (300 mg total) by mouth 3 (three) times daily.    HYDROcodone-acetaminophen (NORCO)  mg per tablet Take 1 tablet by mouth every 6 (six) hours as needed for Pain.    meperidine (DEMEROL) 50 mg tablet Take 0.5 tablets (25 mg total) by mouth every 6 (six) hours as needed for Pain.    ondansetron (ZOFRAN) 4 MG tablet Take 4 mg by mouth every 8 (eight) hours as needed for Nausea. (Patient not taking: Reported on 5/6/2025)    oxyCODONE-acetaminophen  (PERCOCET) 5-325 mg per tablet Take 1 tablet by mouth every 4 (four) hours as needed for Pain. (Patient not taking: Reported on 5/6/2025)     No current facility-administered medications for this visit.       Review of patient's allergies indicates:   Allergen Reactions    Penicillin g benzathine Hives     Hives 50 years ago    Statins-hmg-coa reductase inhibitors Other (See Comments)     Muscle Aches      Sulfamethoxazole      Other reaction(s): itcing, red splotches    Codeine Nausea And Vomiting    Hydrocodone Nausea And Vomiting    Ibuprofen Nausea And Vomiting    Methocarbamol Nausea And Vomiting    Oxycodone Nausea And Vomiting    Tylenol arthritis [acetaminophen] Nausea And Vomiting       Family History   Problem Relation Name Age of Onset    Heart disease Mother      Heart disease Father         Social History[1]        Review of Systems:    Constitution: Negative for chills, fever, and sweats.  Negative for unexplained weight loss.    HENT:  Negative for headaches and blurry vision.    Cardiovascular:Negative for chest pain or irregular heart beat. Negative for hypertension.    Respiratory:  Negative for cough and shortness of breath.    Gastrointestinal: Negative for abdominal pain, heartburn, melena, nausea, and vomitting.    Genitourinary:  Negative bladder incontinence and dysuria.    Musculoskeletal:  See HPI    Neurological: Negative for numbness.    Psychiatric/Behavioral: Negative for depression.  The patient is not nervous/anxious.      Endocrine: Negative for polyuria    Hematologic/Lymphatic: Negative for bleeding problem.  Does not bruise/bleed easily.    Skin: Negative for poor would healing and rash      Physical Examination:    Vital Signs:    Vitals:    06/19/25 1357   BP: (!) 151/80   Pulse: 87       Body mass index is 19.64 kg/m².    General: No acute distress, alert and oriented, healthy appearing    HEENT: Head is atraumatic, mucous membranes are moist    Neck: Supples, no  JVD    Cardiovascular: Palpable dorsalis pedis and posterior tibial pulses, regular rate and rhythm to those pulses    Lungs: Breathing non-labored    Skin: no rashes appreciated    Neurologic: Can flex and extend knees, ankles, and toes. Sensation is grossly intact    Left knee:  Brisk cap refill disappeared states that distally range of motion left knee is from 4 to 105.  Much less symptomatic with regards to sensitivity.      X-rays:      Assessment::  Status post left total knee arthroplasty    Plan:  Patient's range of motion in his much improved.  She is making slow but steady improvements with the physical therapy.  We will refill her Neurontin for her neuralgia.  We will also try a different narcotic to see if she can tolerate this better to get the last little bit of range of motion.  See him back in a month.    This note was generated with the assistance of ambient listening technology. Verbal consent was obtained by the patient and accompanying visitor(s) for the recording of patient appointment to facilitate this note. I attest to having reviewed and edited the generated note for accuracy, though some syntax or spelling errors may persist. Please contact the author of this note for any clarification.      This note was created using [employer] voice recognition software that occasionally misinterpreted phrases or words.    Consult note is delivered via Epic messaging service.         [1]   Social History  Socioeconomic History    Marital status:    Tobacco Use    Smoking status: Never     Passive exposure: Never    Smokeless tobacco: Never   Substance and Sexual Activity    Alcohol use: Yes     Comment: 1-2 glasses of wine per year    Drug use: Yes     Types: Hydrocodone    Sexual activity: Yes     Partners: Male     Comment:    Social History Narrative    ** Merged History Encounter **         ** Merged History Encounter **          Social Drivers of Health     Financial Resource Strain: Low  Risk  (4/1/2025)    [facility]    Overall Financial Resource Strain (CARDIA)     Difficulty of Paying Living Expenses: Not hard at all   Food Insecurity: No Food Insecurity (4/1/2025)    Received from [facility] and Affiliates    Hunger Vital Sign     Worried About Running Out of Food in the Last Year: Never true     Ran Out of Food in the Last Year: Never true   Transportation Needs: No Transportation Needs (4/1/2025)    [facility]    PRAPARE - Transportation     Lack of Transportation (Medical): No     Lack of Transportation (Non-Medical): No   Physical Activity: Sufficiently Active (4/1/2025)    [facility]    Exercise Vital Sign     Days of Exercise per Week: 7 days     Minutes of Exercise per Session: 150+ min   Stress: No Stress Concern Present (4/1/2025)    Received from [facility] and [facility]ates    [place] of Occupational Health - Occupational Stress Questionnaire     Feeling of Stress : Not at all   Housing Stability: Low Risk  (4/1/2025)    [facility]    Housing Stability Vital Sign     Unable to Pay for Housing in the Last Year: No     Number of Times Moved in the Last Year: 0     Homeless in the Last Year: No

## 2025-07-22 ENCOUNTER — OFFICE VISIT (OUTPATIENT)
Dept: ORTHOPEDICS | Facility: CLINIC | Age: 80
End: 2025-07-22
Payer: MEDICARE

## 2025-07-22 VITALS
DIASTOLIC BLOOD PRESSURE: 77 MMHG | HEART RATE: 79 BPM | HEIGHT: 62 IN | SYSTOLIC BLOOD PRESSURE: 155 MMHG | BODY MASS INDEX: 20.46 KG/M2 | WEIGHT: 111.19 LBS

## 2025-07-22 DIAGNOSIS — Z96.652 AFTERCARE FOLLOWING LEFT KNEE JOINT REPLACEMENT SURGERY: Primary | ICD-10-CM

## 2025-07-22 DIAGNOSIS — Z47.1 AFTERCARE FOLLOWING LEFT KNEE JOINT REPLACEMENT SURGERY: Primary | ICD-10-CM

## 2025-07-22 PROCEDURE — 3288F FALL RISK ASSESSMENT DOCD: CPT | Mod: CPTII,,, | Performed by: ORTHOPAEDIC SURGERY

## 2025-07-22 PROCEDURE — 3078F DIAST BP <80 MM HG: CPT | Mod: CPTII,,, | Performed by: ORTHOPAEDIC SURGERY

## 2025-07-22 PROCEDURE — 1159F MED LIST DOCD IN RCRD: CPT | Mod: CPTII,,, | Performed by: ORTHOPAEDIC SURGERY

## 2025-07-22 PROCEDURE — 1101F PT FALLS ASSESS-DOCD LE1/YR: CPT | Mod: CPTII,,, | Performed by: ORTHOPAEDIC SURGERY

## 2025-07-22 PROCEDURE — 99214 OFFICE O/P EST MOD 30 MIN: CPT | Mod: ,,, | Performed by: ORTHOPAEDIC SURGERY

## 2025-07-22 PROCEDURE — 3077F SYST BP >= 140 MM HG: CPT | Mod: CPTII,,, | Performed by: ORTHOPAEDIC SURGERY

## 2025-07-22 RX ORDER — GABAPENTIN 300 MG/1
300 CAPSULE ORAL 3 TIMES DAILY
Qty: 90 CAPSULE | Refills: 2 | Status: SHIPPED | OUTPATIENT
Start: 2025-07-22 | End: 2025-10-20

## 2025-07-22 NOTE — PROGRESS NOTES
Chief Complaint:   Chief Complaint   Patient presents with    Left Knee - Follow-up     3 months sp Lt TKA Sx 4/07/25-oogl, pain most of the time, keeps her up at night, has to be careful with the first few steps, here with her  today ([place]), ambulates with a cane, patient reports she has to hold the knee when she moves it, swelling on the left leg       History of present illness:  History of Present Illness    CHIEF COMPLAINT:  - Sherry presents for follow-up s/p left TKA with ongoing knee pain and sensitivity.    HPI:  Sherry presents for follow-up approximately 3.5 months post-knee surgery. She reports ongoing knee pain, stating it still hurts and hurts all the time. Pain increases when standing up, noting it takes a moment for the knee to adjust and calm down. She has been attending PT twice weekly and reports some improvement, stating it seems to be getting a little more limber. She mentions using her mother's walking aid. She describes a challenging post-op period as a difficult experience where she was not eating and felt unwell. Her weight has improved from a low point, now at 111 lbs. She is currently taking Gabapentin 3 times daily and Meloxicam daily for pain management and inflammation control.    She denies crying due to pain. She denies any specific injuries or trauma to the knee since the surgery.    PREVIOUS TREATMENTS:  - PT: Twice a week, ongoing, providing improvement in limberness and range of motion.    IMAGING:  - XR Left Knee: One month ago    MEDICATIONS:  - Gabapentin: Three times daily for pain management, helping  - Meloxicam: Once daily for arthritis    SURGICAL HISTORY:  - Left TKA: 3.5 months ago    WORK STATUS:  - Working from home  - Attending work twice weekly (possibly referring to therapy appointments)      ROS:  Constitutional: +appetite changes  Musculoskeletal: +joint pain, +back pain, +limited movement  Psychiatric: +emotional lability          Past Medical  "History:   Diagnosis Date    Age-related nuclear cataract of both eyes 5/23/2022    Chronic idiopathic constipation 5/23/2022    Claustrophobia     " close doors"    Diverticulitis     hx    Dry eyes     Gastroesophageal reflux disease without esophagitis 5/23/2022    [place] (hard of hearing)     Left knee pain     Dr Godwin    Migraine headache 5/23/2022    inpast    Mixed hyperlipidemia 5/23/2022    ASCVD Score = 19.8%     Primary hypertension 5/23/2022    Renal stones     hx renal stones    Right hip pain     Right hip replacement 2019    Wears glasses        Past Surgical History:   Procedure Laterality Date    CHOLECYSTECTOMY      COLONOSCOPY      Dr Love 10/2021    DENTAL SURGERY      HEEL SPUR SURGERY Right     with pinning    mammogram 07/2021      [facility]    OOPHORECTOMY      OPEN REDUCTION AND INTERNAL FIXATION (ORIF) OF INJURY OF ELBOW Bilateral 2020    ROBOTIC ARTHROPLASTY, KNEE Left 4/7/2025    Procedure: SDD- ROBOTIC ARTHROPLASTY, KNEE, TOTAL;  Surgeon: José Garcia MD;  Location: [facility];  Service: Orthopedics;  Laterality: Left;    TOTAL HIP ARTHROPLASTY Right     2019       Current Outpatient Medications   Medication Sig    acetaminophen (TYLENOL) 325 MG tablet Take 325 mg by mouth every 6 (six) hours as needed for Pain.    diclofenac (VOLTAREN) 50 MG EC tablet Take 50 mg by mouth 2 (two) times daily.    eszopiclone (LUNESTA) 3 mg Tab Take 1 tablet (3 mg total) by mouth nightly as needed (Insomnia).    ezetimibe (ZETIA) 10 mg tablet Take 1 tablet (10 mg total) by mouth once daily.    gabapentin (NEURONTIN) 300 MG capsule Take 1 capsule (300 mg total) by mouth 2 (two) times daily.    Lactobacillus acidophilus (PROBIOTIC ACIDOPHILUS ORAL) Take 1 tablet by mouth Daily. Jose-biotic 1 daily    lisinopriL 10 MG tablet Take 1 tablet (10 mg total) by mouth once daily.    meloxicam (MOBIC) 15 MG tablet Take 1 tablet (15 mg total) by mouth once daily.    meperidine (DEMEROL) 50 mg tablet " Take 0.5 tablets (25 mg total) by mouth every 6 (six) hours as needed for Pain.    multivitamin/iron/folic acid (CENTRUM ORAL) Take 1 tablet by mouth Daily. By mouth daily    naproxen sodium (ANAPROX) 220 MG tablet Take 220 mg by mouth 2 (two) times daily with meals.    omeprazole (PRILOSEC) 40 MG capsule Take 1 capsule (40 mg total) by mouth once daily.    RESTASIS 0.05 % ophthalmic emulsion Place 1 drop into both eyes 2 (two) times daily.    valACYclovir (VALTREX) 1000 MG tablet See Instructions, TAKE TWO TABLETS TWICE DAILY FOR 2 DOSES PRN FOR FEVER BLISTERS, # 10 tab(s), 3 Refill(s), Pharmacy: [facility], 157, cm, Height/Length Dosing, 01/27/22 8:56:00 CST, 51.95, kg, Weight Dosing, 01/27/22 8:56:00 CST    gabapentin (NEURONTIN) 300 MG capsule Take 1 capsule (300 mg total) by mouth 3 (three) times daily.    gabapentin (NEURONTIN) 300 MG capsule Take 1 capsule (300 mg total) by mouth 3 (three) times daily.    HYDROcodone-acetaminophen (NORCO)  mg per tablet Take 1 tablet by mouth every 6 (six) hours as needed for Pain.    ondansetron (ZOFRAN) 4 MG tablet Take 4 mg by mouth every 8 (eight) hours as needed for Nausea. (Patient not taking: Reported on 5/6/2025)    oxyCODONE-acetaminophen (PERCOCET) 5-325 mg per tablet Take 1 tablet by mouth every 4 (four) hours as needed for Pain. (Patient not taking: Reported on 5/6/2025)     No current facility-administered medications for this visit.       Review of patient's allergies indicates:   Allergen Reactions    Penicillin g benzathine Hives     Hives 50 years ago    Statins-hmg-coa reductase inhibitors Other (See Comments)     Muscle Aches      Sulfamethoxazole      Other reaction(s): itcing, red splotches    Codeine Nausea And Vomiting    Hydrocodone Nausea And Vomiting    Ibuprofen Nausea And Vomiting    Methocarbamol Nausea And Vomiting    Oxycodone Nausea And Vomiting    Tylenol arthritis [acetaminophen] Nausea And Vomiting       Family History   Problem  Relation Name Age of Onset    Heart disease Mother      Heart disease Father         Social History[1]        Review of Systems:    Constitution: Negative for chills, fever, and sweats.  Negative for unexplained weight loss.    HENT:  Negative for headaches and blurry vision.    Cardiovascular:Negative for chest pain or irregular heart beat. Negative for hypertension.    Respiratory:  Negative for cough and shortness of breath.    Gastrointestinal: Negative for abdominal pain, heartburn, melena, nausea, and vomitting.    Genitourinary:  Negative bladder incontinence and dysuria.    Musculoskeletal:  See HPI    Neurological: Negative for numbness.    Psychiatric/Behavioral: Negative for depression.  The patient is not nervous/anxious.      Endocrine: Negative for polyuria    Hematologic/Lymphatic: Negative for bleeding problem.  Does not bruise/bleed easily.    Skin: Negative for poor would healing and rash      Physical Examination:    Vital Signs:    Vitals:    07/22/25 1353   BP: (!) 155/77   Pulse: 79       Body mass index is 20.34 kg/m².    General: No acute distress, alert and oriented, healthy appearing    HEENT: Head is atraumatic, mucous membranes are moist    Neck: Supples, no JVD    Cardiovascular: Palpable dorsalis pedis and posterior tibial pulses, regular rate and rhythm to those pulses    Lungs: Breathing non-labored    Skin: no rashes appreciated    Neurologic: Can flex and extend knees, ankles, and toes. Sensation is grossly intact    Left knee:  Patient's knee gets full extension.  It had flexion of about 120°.  It had significant with a sensitive.  Continues to have some issues with the any rapid motion.    X-rays:      Assessment::  Status post left total knee arthroplasty    Plan:  Patient's has a long and drawn out course although she is finally making a turn.  It had knee is improving although slowly.  She is on the Neurontin and some of her neuropathic pain that has significantly improved.  He  would like to continue this.  We will plan to see her back in a few months.  Repeat x-rays when she returns.  Plan to wean her off of the Neurontin when she gets back.    This note was generated with the assistance of ambient listening technology. Verbal consent was obtained by the patient and accompanying visitor(s) for the recording of patient appointment to facilitate this note. I attest to having reviewed and edited the generated note for accuracy, though some syntax or spelling errors may persist. Please contact the author of this note for any clarification.       This note was created using [employer] voice recognition software that occasionally misinterpreted phrases or words.    Consult note is delivered via Epic messaging service.         [1]   Social History  Socioeconomic History    Marital status:    Tobacco Use    Smoking status: Never     Passive exposure: Never    Smokeless tobacco: Never   Substance and Sexual Activity    Alcohol use: Yes     Comment: 1-2 glasses of wine per year    Drug use: Yes     Types: Hydrocodone    Sexual activity: Yes     Partners: Male     Comment:    Social History Narrative    ** Merged History Encounter **         ** Merged History Encounter **          Social Drivers of Health     Financial Resource Strain: Low Risk  (4/1/2025)    [facility]    Overall Financial Resource Strain (CARDIA)     Difficulty of Paying Living Expenses: Not hard at all   Food Insecurity: No Food Insecurity (4/1/2025)    Received from [facility] Its Subsidiaries and Affiliates    Hunger Vital Sign     Worried About Running Out of Food in the Last Year: Never true     Ran Out of Food in the Last Year: Never true   Transportation Needs: No Transportation Needs (4/1/2025)    Received from [facility] and Its Subsidiaries  and Affiliates    PRAPARE - Transportation     Lack of Transportation (Medical): No     Lack of Transportation (Non-Medical): No   Physical Activity: Sufficiently Active (4/1/2025)    Received from [facility] Its Subsidiaries and Affiliates    Exercise Vital Sign     Days of Exercise per Week: 7 days     Minutes of Exercise per Session: 150+ min   Stress: No Stress Concern Present (4/1/2025)    Received from [facility] and Its Subsidiaries and Affiliates    [employer] [place] [employer] - Occupational Stress Questionnaire     Feeling of Stress : Not at all   Housing Stability: Low Risk  (4/1/2025)    Received from [facility] Its Subsidiaries and Affiliates    Housing Stability Vital Sign     Unable to Pay for Housing in the Last Year: No     Number of Times Moved in the Last Year: 0     Homeless in the Last Year: No

## 2025-07-23 ENCOUNTER — PATIENT MESSAGE (OUTPATIENT)
Dept: ORTHOPEDICS | Facility: CLINIC | Age: 80
End: 2025-07-23
Payer: MEDICARE

## (undated) DEVICE — DEVICE STRATAFIX SYMMETRIC +

## (undated) DEVICE — SUT MONOCRYL 3-0 PS-2 UND

## (undated) DEVICE — GLOVE SENSICARE PI GRN 8.5

## (undated) DEVICE — DRAPE MEDIUM SHEET 40X70IN

## (undated) DEVICE — KIT TRIATHLON CR TIB PREP SZ3

## (undated) DEVICE — KIT SURGICAL TURNOVER

## (undated) DEVICE — GLOVE SENSICARE PI ORTHO LT 8

## (undated) DEVICE — SUT MCRYL PLUS 2-0 CT-1 36IN

## (undated) DEVICE — CUFF ATS 2 PORT SNGL BLDR 34IN

## (undated) DEVICE — WRAP DEMAYO LEG STERILE

## (undated) DEVICE — KIT TRIATHLON CR FEM PREP SZ2

## (undated) DEVICE — KIT CHECKPOINT MAKO

## (undated) DEVICE — DRAPE FULL SHEET 70X100IN

## (undated) DEVICE — BOWL STERILE LG GRAD 32OZ

## (undated) DEVICE — KIT DRAPE RIO ONE PIECE W/POCK

## (undated) DEVICE — PAD ABDOMINAL STERILE 8X10IN

## (undated) DEVICE — SOL NACL IRR 1000ML BTL

## (undated) DEVICE — GLOVE SENSICARE PI GRN 7

## (undated) DEVICE — TAPE SILK 3IN

## (undated) DEVICE — GLOVE SIGNATURE ESSNTL LTX 8.5

## (undated) DEVICE — SUT VICRYL BR 1 GEN 27 CT-1

## (undated) DEVICE — CUSHION  WC FOAM 20X20X.75IN

## (undated) DEVICE — CORD SILICONE RETRACTOR

## (undated) DEVICE — ELECTRODE PATIENT RETURN DISP

## (undated) DEVICE — Device

## (undated) DEVICE — KIT VIZADISC KNEE TRACKING

## (undated) DEVICE — APPLICATOR CHLORAPREP ORN 26ML

## (undated) DEVICE — SYR 10CC LUER LOCK

## (undated) DEVICE — SOL POVIDONE IODINE PCH 3/4OZ

## (undated) DEVICE — DRESSING XEROFORM NONADH 1X8IN

## (undated) DEVICE — PADDING WYTEX UNDRCST 6INX4YD

## (undated) DEVICE — SOL NORMAL USPCA 0.9%

## (undated) DEVICE — PIN FIXATION BONE 140X3.2MM
Type: IMPLANTABLE DEVICE | Site: KNEE | Status: NON-FUNCTIONAL
Removed: 2025-04-07

## (undated) DEVICE — BLADE SAG DUAL CUT 18X90X1.35

## (undated) DEVICE — COVER TABLE HVY DTY 60X90IN

## (undated) DEVICE — DRAPE STERI U-SHAPED 47X51IN

## (undated) DEVICE — PIN BONE 3.2X110MM
Type: IMPLANTABLE DEVICE | Site: KNEE | Status: NON-FUNCTIONAL
Removed: 2025-04-07

## (undated) DEVICE — GLOVE SENSICARE PI MICRO 6.5

## (undated) DEVICE — GOWN POLY REINF X-LONG 2XL

## (undated) DEVICE — SPONGE COTTON TRAY 4X4IN

## (undated) DEVICE — BLADE MAKO NARROW